# Patient Record
Sex: FEMALE | Race: WHITE | NOT HISPANIC OR LATINO | Employment: FULL TIME | URBAN - METROPOLITAN AREA
[De-identification: names, ages, dates, MRNs, and addresses within clinical notes are randomized per-mention and may not be internally consistent; named-entity substitution may affect disease eponyms.]

---

## 2017-09-15 ENCOUNTER — ALLSCRIPTS OFFICE VISIT (OUTPATIENT)
Dept: OTHER | Facility: OTHER | Age: 57
End: 2017-09-15

## 2017-09-15 ENCOUNTER — GENERIC CONVERSION - ENCOUNTER (OUTPATIENT)
Dept: OTHER | Facility: OTHER | Age: 57
End: 2017-09-15

## 2017-09-15 DIAGNOSIS — M25.512 PAIN IN LEFT SHOULDER: ICD-10-CM

## 2017-09-15 DIAGNOSIS — E11.9 TYPE 2 DIABETES MELLITUS WITHOUT COMPLICATIONS (HCC): ICD-10-CM

## 2017-09-15 DIAGNOSIS — I10 ESSENTIAL (PRIMARY) HYPERTENSION: ICD-10-CM

## 2017-09-15 DIAGNOSIS — R00.2 PALPITATIONS: ICD-10-CM

## 2017-11-30 ENCOUNTER — LAB CONVERSION - ENCOUNTER (OUTPATIENT)
Dept: OTHER | Facility: OTHER | Age: 57
End: 2017-11-30

## 2017-11-30 LAB
A/G RATIO (HISTORICAL): 1.5 (CALC) (ref 1–2.5)
ALBUMIN SERPL BCP-MCNC: 4.3 G/DL (ref 3.6–5.1)
ALP SERPL-CCNC: 50 U/L (ref 33–130)
ALT SERPL W P-5'-P-CCNC: 16 U/L (ref 6–29)
AST SERPL W P-5'-P-CCNC: 12 U/L (ref 10–35)
BASOPHILS # BLD AUTO: 0.9 %
BASOPHILS # BLD AUTO: 61 CELLS/UL (ref 0–200)
BILIRUB SERPL-MCNC: 0.4 MG/DL (ref 0.2–1.2)
BILIRUB UR QL STRIP: NEGATIVE
BUN SERPL-MCNC: 39 MG/DL (ref 7–25)
BUN/CREA RATIO (HISTORICAL): 36 (CALC) (ref 6–22)
CALCIUM SERPL-MCNC: 10.2 MG/DL (ref 8.6–10.4)
CHLORIDE SERPL-SCNC: 105 MMOL/L (ref 98–110)
CHOLEST SERPL-MCNC: 189 MG/DL
CHOLEST/HDLC SERPL: 3.9 (CALC)
CO2 SERPL-SCNC: 27 MMOL/L (ref 20–31)
COLOR UR: YELLOW
COMMENT (HISTORICAL): CLEAR
CREAT SERPL-MCNC: 1.08 MG/DL (ref 0.5–1.05)
CREATININE, RANDOM URINE (HISTORICAL): 51 MG/DL (ref 20–320)
DEPRECATED RDW RBC AUTO: 12.9 % (ref 11–15)
EGFR AFRICAN AMERICAN (HISTORICAL): 65 ML/MIN/1.73M2
EGFR-AMERICAN CALC (HISTORICAL): 56 ML/MIN/1.73M2
EOSINOPHIL # BLD AUTO: 279 CELLS/UL (ref 15–500)
EOSINOPHIL # BLD AUTO: 4.1 %
FECAL OCCULT BLOOD DIAGNOSTIC (HISTORICAL): NEGATIVE
GAMMA GLOBULIN (HISTORICAL): 2.9 G/DL (CALC) (ref 1.9–3.7)
GLUCOSE (HISTORICAL): 154 MG/DL (ref 65–99)
GLUCOSE (HISTORICAL): NEGATIVE
HCT VFR BLD AUTO: 35.4 % (ref 35–45)
HDLC SERPL-MCNC: 49 MG/DL
HGB BLD-MCNC: 11.4 G/DL (ref 11.7–15.5)
KETONES UR STRIP-MCNC: NEGATIVE MG/DL
LDL CHOLESTEROL (HISTORICAL): 121 MG/DL (CALC)
LEUKOCYTE ESTERASE UR QL STRIP: NEGATIVE
LYMPHOCYTES # BLD AUTO: 1945 CELLS/UL (ref 850–3900)
LYMPHOCYTES # BLD AUTO: 28.6 %
MAGNESIUM, UR (HISTORICAL): 1 MG/DL
MCH RBC QN AUTO: 28.8 PG (ref 27–33)
MCHC RBC AUTO-ENTMCNC: 32.2 G/DL (ref 32–36)
MCV RBC AUTO: 89.4 FL (ref 80–100)
MICROALBUMIN/CREATININE RATIO (HISTORICAL): 20 MCG/MG CREAT
MONOCYTES # BLD AUTO: 626 CELLS/UL (ref 200–950)
MONOCYTES (HISTORICAL): 9.2 %
NEUTROPHILS # BLD AUTO: 3890 CELLS/UL (ref 1500–7800)
NEUTROPHILS # BLD AUTO: 57.2 %
NITRITE UR QL STRIP: NEGATIVE
NON-HDL-CHOL (CHOL-HDL) (HISTORICAL): 140 MG/DL (CALC)
PH UR STRIP.AUTO: 6 [PH] (ref 5–8)
PLATELET # BLD AUTO: 198 THOUSAND/UL (ref 140–400)
PMV BLD AUTO: 11.1 FL (ref 7.5–12.5)
POTASSIUM SERPL-SCNC: 4.5 MMOL/L (ref 3.5–5.3)
PROT UR STRIP-MCNC: NEGATIVE MG/DL
RBC # BLD AUTO: 3.96 MILLION/UL (ref 3.8–5.1)
SODIUM SERPL-SCNC: 138 MMOL/L (ref 135–146)
SP GR UR STRIP.AUTO: 1.01 (ref 1–1.03)
TOTAL PROTEIN (HISTORICAL): 7.2 G/DL (ref 6.1–8.1)
TRIGL SERPL-MCNC: 86 MG/DL
TSH SERPL DL<=0.05 MIU/L-ACNC: 2.35 MIU/L (ref 0.4–4.5)
WBC # BLD AUTO: 6.8 THOUSAND/UL (ref 3.8–10.8)

## 2017-12-07 ENCOUNTER — GENERIC CONVERSION - ENCOUNTER (OUTPATIENT)
Dept: OTHER | Facility: OTHER | Age: 57
End: 2017-12-07

## 2017-12-07 ENCOUNTER — ALLSCRIPTS OFFICE VISIT (OUTPATIENT)
Dept: OTHER | Facility: OTHER | Age: 57
End: 2017-12-07

## 2017-12-08 NOTE — PROGRESS NOTES
Assessment    1  DM type 2 (diabetes mellitus, type 2) (250 00) (E11 9)   2  Hypertension, essential (401 9) (I10)   3  Hyperlipidemia, unspecified hyperlipidemia type (272 4) (E78 5)   4  Seasonal allergies (477 9) (J30 2)    Plan  Left shoulder pain, unspecified chronicity    · Meloxicam 15 MG Oral Tablet; TAKE 1 TABLET DAILY WITH FOOD  Seasonal allergies    · Loratadine 10 MG Oral Tablet; TAKE 1 TABLET DAILY AS NEEDED    Discussion/Summary  Possible side effects of new medications were reviewed with the patient/guardian today  The treatment plan was reviewed with the patient/guardian  The patient/guardian understands and agrees with the treatment plan      Chief Complaint  FOLLOW UP    C/O POSSIBLE ALLERGIES, HAS STARTED CHLOR TABS  WOULD LIKE THROAT CHECKED   Patient is here today for follow up of chronic conditions described in HPI  History of Present Illness  She is doing well overall  BS are a bit high  She has some hypoglycemic events (lows to 30's)  She is on metformin and Amaryl  She knows the s/s of hypoglycemia  lipids are not at goal  She declines a statin medication  BP is at goal  She has no HA or vision changes  She has no CP or SOB  She has no cough  Review of Systems   Constitutional: No fever, no chills, feels well, no tiredness, no recent weight gain or weight loss  Eyes: No complaints of eye pain, no red eyes, no eyesight problems, no discharge, no dry eyes, no itching of eyes  ENT: no complaints of earache, no loss of hearing, no nose bleeds, no nasal discharge, no sore throat, no hoarseness  Cardiovascular: No complaints of slow heart rate, no fast heart rate, no chest pain, no palpitations, no leg claudication, no lower extremity edema  Respiratory: No complaints of shortness of breath, no wheezing, no cough, no SOB on exertion, no orthopnea, no PND  Gastrointestinal: No complaints of abdominal pain, no constipation, no nausea or vomiting, no diarrhea, no bloody stools  Genitourinary: No complaints of dysuria, no incontinence, no pelvic pain, no dysmenorrhea, no vaginal discharge or bleeding  Musculoskeletal: No complaints of arthralgias, no myalgias, no joint swelling or stiffness, no limb pain or swelling  Integumentary: No complaints of skin rash or lesions, no itching, no skin wounds, no breast pain or lump  Neurological: No complaints of headache, no confusion, no convulsions, no numbness, no dizziness or fainting, no tingling, no limb weakness, no difficulty walking  Psychiatric: Not suicidal, no sleep disturbance, no anxiety or depression, no change in personality, no emotional problems  Endocrine: No complaints of proptosis, no hot flashes, no muscle weakness, no deepening of the voice, no feelings of weakness  Hematologic/Lymphatic: No complaints of swollen glands, no swollen glands in the neck, does not bleed easily, does not bruise easily  Active Problems  1  DM type 2 (diabetes mellitus, type 2) (250 00) (E11 9)   2  GERD without esophagitis (530 81) (K21 9)   3  Heart palpitations (785 1) (R00 2)   4  Denied: History of substance abuse   5  Hypertension, essential (401 9) (I10)   6  Left shoulder pain, unspecified chronicity (719 41) (M25 512)   7  Denied: History of Mental health disorder   8  Peripheral edema (782 3) (R60 9)   9  Screening for colon cancer (V76 51) (Z12 11)    Past Medical History  1  Denied: History of substance abuse   2  Denied: History of Mental health disorder    The active problems and past medical history were reviewed and updated today  Surgical History  1  History of Oral Surgery Tooth Extraction    The surgical history was reviewed and updated today  Family History  Mother    1  Family history of cardiac disorder (V17 49) (Z82 49)   2  Family history of diabetes mellitus (V18 0) (Z83 3)   3  Family history of malignant neoplasm (V16 9) (Z80 9)  Father    4  Family history of cardiac disorder (V17 49) (Z82 49)   5  Family history of malignant neoplasm (V16 9) (Z80 9)  Sister    6  Family history of diabetes mellitus (V18 0) (Z83 3)  Brother    7  Family history of malignant neoplasm (V16 9) (Z80 9)  Grandmother    8  Family history of diabetes mellitus (V18 0) (Z83 3)    The family history was reviewed and updated today  Social History     · Never a smoker  The social history was reviewed and updated today  The social history was reviewed and is unchanged  Current Meds   1  Glimepiride 4 MG Oral Tablet; TAKE 1 TABLET TWICE DAILY  Requested for: 03Mdj2701; Last Rx:80Jxf4463 Ordered   2  Lisinopril 20 MG Oral Tablet; Take one tablet daily  Requested for: 17Gvl6311; Last Rx:55Iej6226 Ordered   3  MetFORMIN HCl - 1000 MG Oral Tablet; TAKE 1 TABLET EVERY 12 HOURS DAILY; Therapy: 49Dem6999 to (Evaluate:12Oct2018)  Requested for: 04QTB4552; Last Rx:94Wcl0604 Ordered   4  RaNITidine HCl - 300 MG Oral Capsule; TAKE 1 CAPSULE TWICE DAILY  Requested for: 77Jak3010; Last Rx:05Zpw3781 Ordered   5  Spironolactone 25 MG Oral Tablet; Take 1 tablet daily  Requested for: 56Eve6896; Last Rx:78Bic3649 Ordered    The medication list was reviewed and updated today  Allergies  1  Aleve TABS   2  codeine   3  Naproxen TABS   4  Sodium Perborate Granules   5  Sulfa Drugs    Vitals  Vital Signs    Recorded: 65Ykf5677 08:16AM   Heart Rate 84   Respiration 15   Systolic 661   Diastolic 74   Height 5 ft 2 in   Weight 229 lb 8 oz   BMI Calculated 41 98   BSA Calculated 2 03   O2 Saturation 98       Physical Exam   Constitutional  General appearance: No acute distress, well appearing and well nourished  Pulmonary  Respiratory effort: No increased work of breathing or signs of respiratory distress  Auscultation of lungs: Clear to auscultation  Cardiovascular  Auscultation of heart: Normal rate and rhythm, normal S1 and S2, without murmurs     Examination of extremities for edema and/or varicosities: Normal    Abdomen  Abdomen: Non-tender, no masses  Liver and spleen: No hepatomegaly or splenomegaly  Health Management  Screening for colon cancer   COLONOSCOPY; every 10 years; Last 00EBB5112; Next Due: 05HEG3134;  Active    Signatures   Electronically signed by : Corby Fernández MD; Dec  7 2017  9:00AM EST                       (Author)

## 2017-12-22 ENCOUNTER — GENERIC CONVERSION - ENCOUNTER (OUTPATIENT)
Dept: OTHER | Facility: OTHER | Age: 57
End: 2017-12-22

## 2018-01-22 VITALS
OXYGEN SATURATION: 98 % | SYSTOLIC BLOOD PRESSURE: 126 MMHG | BODY MASS INDEX: 43.87 KG/M2 | HEIGHT: 62 IN | RESPIRATION RATE: 15 BRPM | WEIGHT: 238.38 LBS | DIASTOLIC BLOOD PRESSURE: 74 MMHG | HEART RATE: 74 BPM

## 2018-01-23 VITALS
RESPIRATION RATE: 15 BRPM | BODY MASS INDEX: 42.23 KG/M2 | HEART RATE: 84 BPM | DIASTOLIC BLOOD PRESSURE: 74 MMHG | WEIGHT: 229.5 LBS | OXYGEN SATURATION: 98 % | SYSTOLIC BLOOD PRESSURE: 120 MMHG | HEIGHT: 62 IN

## 2018-01-23 NOTE — PROGRESS NOTES
History of Present Illness  Care Coordination Encounter Information:   Type of Encounter: In Office   Contact: Initial Contact    Spoke to Patient   met with pt at pcp office  new pt to practice  DM in need of education  pathophysiology of diabetes, Discuss treatment goals, Benefits of control, Discuss general nutrition concepts, Portion sizes, Discuss food label reading, Basic carbohydrate counting, target blood glucose ranges and A1C,discussed testing times importance of reporting readings to pcp  Reviewed symptoms prevention and tx of hypo/hyperglycemia  Discussed BP goals, foot care and importance of exercise  needs eye exam and podiatry appt  set both as her own goal  agrees to follow up call  will record BS at varied times to review next call  Cm called pt after visit left message to call CM if needs assistanc ein finding podiatry or eye docotr for appts  Active Problems    1  DM type 2 (diabetes mellitus, type 2) (250 00) (E11 9)   2  GERD without esophagitis (530 81) (K21 9)   3  Heart palpitations (785 1) (R00 2)   4  Denied: History of substance abuse   5  Hyperlipidemia, unspecified hyperlipidemia type (272 4) (E78 5)   6  Hypertension, essential (401 9) (I10)   7  Left shoulder pain, unspecified chronicity (719 41) (M25 512)   8  Denied: History of Mental health disorder   9  Peripheral edema (782 3) (R60 9)   10  Screening for colon cancer (V76 51) (Z12 11)   11  Seasonal allergies (477 9) (J30 2)    Past Medical History    1  Denied: History of substance abuse   2  Denied: History of Mental health disorder    Surgical History    1  History of Oral Surgery Tooth Extraction    Family History  Mother    1  Family history of cardiac disorder (V17 49) (Z82 49)   2  Family history of diabetes mellitus (V18 0) (Z83 3)   3  Family history of malignant neoplasm (V16 9) (Z80 9)  Father    4  Family history of cardiac disorder (V17 49) (Z82 49)   5   Family history of malignant neoplasm (V16 9) (Z80 9)  Sister    6  Family history of diabetes mellitus (V18 0) (Z83 3)  Brother    7  Family history of malignant neoplasm (V16 9) (Z80 9)  Grandmother    8  Family history of diabetes mellitus (V18 0) (Z83 3)    Social History    · Never a smoker    Current Meds    1  Glimepiride 4 MG Oral Tablet; TAKE 1 TABLET TWICE DAILY  Requested for: 83Yxc5300;   Last Rx:22Sbb1658 Ordered   2  MetFORMIN HCl - 1000 MG Oral Tablet; TAKE 1 TABLET EVERY 12 HOURS DAILY; Therapy: 62Evq6828 to (Evaluate:12Oct2018)  Requested for: 37TAZ2941; Last   Rx:87Vci8570 Ordered    3  RaNITidine HCl - 300 MG Oral Capsule; TAKE 1 CAPSULE TWICE DAILY  Requested for:   21Cde4686; Last Rx:88Mte9564 Ordered    4  Lisinopril 20 MG Oral Tablet; Take one tablet daily  Requested for: 32Asl6088; Last   Rx:56Nva2832 Ordered    5  Meloxicam 15 MG Oral Tablet; TAKE 1 TABLET DAILY WITH FOOD; Therapy: 46KAE0769 to (Evaluate:05Jun2018); Last Rx:96Cms4538 Ordered    6  Spironolactone 25 MG Oral Tablet; Take 1 tablet daily  Requested for: 51Jsb9107; Last   Rx:13Ntt8843 Ordered    7  Loratadine 10 MG Oral Tablet; TAKE 1 TABLET DAILY AS NEEDED; Therapy: 14UTH6460 to (Evaluate:05Jun2018); Last Rx:96Oul3651 Ordered    Allergies    1  Aleve TABS   2  codeine   3  Naproxen TABS   4  Sodium Perborate Granules   5  Sulfa Drugs    Health Management   COLONOSCOPY; every 10 years; Last 88UQR1153; Next Due: 24VAL0639; Active    End of Encounter Meds    1  Glimepiride 4 MG Oral Tablet; TAKE 1 TABLET TWICE DAILY  Requested for: 70Ujx0816;   Last Rx:27Ams9418 Ordered   2  MetFORMIN HCl - 1000 MG Oral Tablet; TAKE 1 TABLET EVERY 12 HOURS DAILY; Therapy: 53Jya6606 to (Evaluate:12Oct2018)  Requested for: 50FPE5033; Last   Rx:00Ujg6393 Ordered    3  RaNITidine HCl - 300 MG Oral Capsule; TAKE 1 CAPSULE TWICE DAILY  Requested for:   71Lhp6493; Last Rx:11Lpu3357 Ordered    4  Lisinopril 20 MG Oral Tablet;  Take one tablet daily  Requested for: 76Azt0301; Last   Rx:72Qke1270 Ordered    5  Meloxicam 15 MG Oral Tablet; TAKE 1 TABLET DAILY WITH FOOD; Therapy: 01MPG7298 to (Evaluate:05Jun2018); Last Rx:19Bze4451 Ordered    6  Spironolactone 25 MG Oral Tablet; Take 1 tablet daily  Requested for: 86Aug9476; Last   Rx:68Keb8080 Ordered    7  Loratadine 10 MG Oral Tablet; TAKE 1 TABLET DAILY AS NEEDED; Therapy: 81MUR8558 to (Evaluate:05Jun2018);  Last Rx:50Lst0008 Ordered    Patient Care Team    Care Team Member Role Specialty Office Number   [phone]   Electronically signed by : Bi Hays, ; Dec  7 2017  5:00PM EST                       (Author)

## 2018-01-23 NOTE — PROGRESS NOTES
History of Present Illness  Care Coordination Encounter Information:   Type of Encounter: Telephonic   Contact: Follow-Up    Spoke to Patient   The reason for this call is to discuss outreach for follow-up /needed services  Patient called to address any questions or concerns  NALM  Active Problems    1  DM type 2 (diabetes mellitus, type 2) (250 00) (E11 9)   2  GERD without esophagitis (530 81) (K21 9)   3  Heart palpitations (785 1) (R00 2)   4  Denied: History of substance abuse   5  Hyperlipidemia, unspecified hyperlipidemia type (272 4) (E78 5)   6  Hypertension, essential (401 9) (I10)   7  Left shoulder pain, unspecified chronicity (719 41) (M25 512)   8  Denied: History of Mental health disorder   9  Peripheral edema (782 3) (R60 9)   10  Screening for colon cancer (V76 51) (Z12 11)   11  Seasonal allergies (477 9) (J30 2)    Past Medical History    1  Denied: History of substance abuse   2  Denied: History of Mental health disorder    Surgical History    1  History of Oral Surgery Tooth Extraction    Family History  Mother    1  Family history of cardiac disorder (V17 49) (Z82 49)   2  Family history of diabetes mellitus (V18 0) (Z83 3)   3  Family history of malignant neoplasm (V16 9) (Z80 9)  Father    4  Family history of cardiac disorder (V17 49) (Z82 49)   5  Family history of malignant neoplasm (V16 9) (Z80 9)  Sister    6  Family history of diabetes mellitus (V18 0) (Z83 3)  Brother    7  Family history of malignant neoplasm (V16 9) (Z80 9)  Grandmother    8  Family history of diabetes mellitus (V18 0) (Z83 3)    Social History    · Never a smoker    Current Meds    1  Glimepiride 4 MG Oral Tablet; TAKE 1 TABLET TWICE DAILY  Requested for: 36Oha3624;   Last Rx:28Joj6112 Ordered   2  MetFORMIN HCl - 1000 MG Oral Tablet; TAKE 1 TABLET EVERY 12 HOURS DAILY; Therapy: 22Czs4044 to (Evaluate:12Oct2018)  Requested for: 53GXB0236; Last   Rx:17Oct2017 Ordered    3   RaNITidine HCl - 300 MG Oral Capsule; TAKE 1 CAPSULE TWICE DAILY  Requested for:   49Agj4798; Last Rx:75Osa5814 Ordered    4  Lisinopril 20 MG Oral Tablet; Take one tablet daily  Requested for: 36Ixh7760; Last   Rx:97Qja0824 Ordered    5  Meloxicam 15 MG Oral Tablet; TAKE 1 TABLET DAILY WITH FOOD; Therapy: 34YJI7968 to (Evaluate:05Jun2018); Last Rx:18Bck6589 Ordered    6  Spironolactone 25 MG Oral Tablet; Take 1 tablet daily  Requested for: 33Jrf0541; Last   Rx:87Ezt2027 Ordered    7  Loratadine 10 MG Oral Tablet; TAKE 1 TABLET DAILY AS NEEDED; Therapy: 89KSH1279 to (Evaluate:05Jun2018); Last Rx:45Gtc4662 Ordered    Allergies    1  Aleve TABS   2  codeine   3  Naproxen TABS   4  Sodium Perborate Granules   5  Sulfa Drugs    Health Management   COLONOSCOPY; every 10 years; Last 49QCU0555; Next Due: 43TOP4882; Active    End of Encounter Meds    1  Glimepiride 4 MG Oral Tablet; TAKE 1 TABLET TWICE DAILY  Requested for: 29Qjj6192;   Last Rx:80Kju1887 Ordered   2  MetFORMIN HCl - 1000 MG Oral Tablet; TAKE 1 TABLET EVERY 12 HOURS DAILY; Therapy: 74Nfs7281 to (Evaluate:12Oct2018)  Requested for: 73XII3158; Last   Rx:57Bmj0344 Ordered    3  RaNITidine HCl - 300 MG Oral Capsule; TAKE 1 CAPSULE TWICE DAILY  Requested for:   67Ish4109; Last Rx:05Lxo0961 Ordered    4  Lisinopril 20 MG Oral Tablet; Take one tablet daily  Requested for: 62Gtr3732; Last   Rx:52Cuj1742 Ordered    5  Meloxicam 15 MG Oral Tablet; TAKE 1 TABLET DAILY WITH FOOD; Therapy: 36NOW0367 to (Evaluate:05Jun2018); Last Rx:39Ncp7837 Ordered    6  Spironolactone 25 MG Oral Tablet; Take 1 tablet daily  Requested for: 46Ccj1815; Last   Rx:85Uey4605 Ordered    7  Loratadine 10 MG Oral Tablet; TAKE 1 TABLET DAILY AS NEEDED; Therapy: 82JOR4424 to (Evaluate:05Jun2018);  Last Rx:34Kth2037 Ordered    Patient Care Team    Care Team Member Role Specialty Office Number   [phone] [facility]        Signatures   Electronically signed by : Shirlene Harley, ; Dec 22 2017  2:33PM EST (Author)

## 2018-02-20 DIAGNOSIS — J30.2 SEASONAL ALLERGIC RHINITIS, UNSPECIFIED CHRONICITY, UNSPECIFIED TRIGGER: ICD-10-CM

## 2018-02-20 DIAGNOSIS — R52 PAIN: ICD-10-CM

## 2018-02-20 DIAGNOSIS — E11.9 TYPE 2 DIABETES MELLITUS WITHOUT COMPLICATION, UNSPECIFIED LONG TERM INSULIN USE STATUS: Primary | ICD-10-CM

## 2018-02-20 RX ORDER — LORATADINE 10 MG/1
1 TABLET ORAL DAILY PRN
COMMUNITY
Start: 2017-12-07 | End: 2018-02-20 | Stop reason: SDUPTHER

## 2018-02-20 RX ORDER — MELOXICAM 15 MG/1
1 TABLET ORAL DAILY
COMMUNITY
Start: 2017-12-07 | End: 2018-02-20 | Stop reason: SDUPTHER

## 2018-02-20 RX ORDER — LISINOPRIL 20 MG/1
1 TABLET ORAL DAILY
COMMUNITY
End: 2018-02-20 | Stop reason: SDUPTHER

## 2018-02-20 RX ORDER — GLIMEPIRIDE 4 MG/1
1 TABLET ORAL 2 TIMES DAILY
COMMUNITY
End: 2018-02-20 | Stop reason: SDUPTHER

## 2018-02-20 RX ORDER — SPIRONOLACTONE 25 MG/1
1 TABLET ORAL DAILY
COMMUNITY
End: 2018-02-20 | Stop reason: SDUPTHER

## 2018-02-22 RX ORDER — LORATADINE 10 MG/1
10 TABLET ORAL DAILY
Qty: 90 TABLET | Refills: 3 | Status: SHIPPED | OUTPATIENT
Start: 2018-02-22 | End: 2020-01-27

## 2018-02-22 RX ORDER — LISINOPRIL 20 MG/1
20 TABLET ORAL DAILY
Qty: 30 TABLET | Refills: 5 | Status: SHIPPED | OUTPATIENT
Start: 2018-02-22 | End: 2018-09-09 | Stop reason: SDUPTHER

## 2018-02-22 RX ORDER — GLIMEPIRIDE 4 MG/1
4 TABLET ORAL 2 TIMES DAILY
Qty: 60 TABLET | Refills: 3 | Status: SHIPPED | OUTPATIENT
Start: 2018-02-22 | End: 2018-07-03 | Stop reason: SDUPTHER

## 2018-02-22 RX ORDER — MELOXICAM 15 MG/1
15 TABLET ORAL DAILY
Qty: 90 TABLET | Refills: 3 | Status: SHIPPED | OUTPATIENT
Start: 2018-02-22 | End: 2019-03-25 | Stop reason: SDUPTHER

## 2018-02-22 RX ORDER — SPIRONOLACTONE 25 MG/1
25 TABLET ORAL DAILY
Qty: 30 TABLET | Refills: 5 | Status: SHIPPED | OUTPATIENT
Start: 2018-02-22 | End: 2018-09-09 | Stop reason: SDUPTHER

## 2018-03-20 ENCOUNTER — OFFICE VISIT (OUTPATIENT)
Dept: FAMILY MEDICINE CLINIC | Facility: CLINIC | Age: 58
End: 2018-03-20
Payer: COMMERCIAL

## 2018-03-20 VITALS
TEMPERATURE: 98.2 F | DIASTOLIC BLOOD PRESSURE: 60 MMHG | BODY MASS INDEX: 43.98 KG/M2 | RESPIRATION RATE: 18 BRPM | SYSTOLIC BLOOD PRESSURE: 104 MMHG | OXYGEN SATURATION: 98 % | WEIGHT: 224 LBS | HEART RATE: 85 BPM | HEIGHT: 60 IN

## 2018-03-20 DIAGNOSIS — R09.89 CHEST CONGESTION: ICD-10-CM

## 2018-03-20 DIAGNOSIS — H65.03 BILATERAL ACUTE SEROUS OTITIS MEDIA, RECURRENCE NOT SPECIFIED: ICD-10-CM

## 2018-03-20 DIAGNOSIS — R06.2 WHEEZING: ICD-10-CM

## 2018-03-20 DIAGNOSIS — R05.9 COUGH: Primary | ICD-10-CM

## 2018-03-20 DIAGNOSIS — R06.02 SHORTNESS OF BREATH: Primary | ICD-10-CM

## 2018-03-20 DIAGNOSIS — J01.90 ACUTE NON-RECURRENT SINUSITIS, UNSPECIFIED LOCATION: ICD-10-CM

## 2018-03-20 PROCEDURE — 99213 OFFICE O/P EST LOW 20 MIN: CPT | Performed by: NURSE PRACTITIONER

## 2018-03-20 RX ORDER — CIPROFLOXACIN 500 MG/1
500 TABLET, FILM COATED ORAL EVERY 12 HOURS SCHEDULED
Qty: 20 TABLET | Refills: 1 | Status: SHIPPED | OUTPATIENT
Start: 2018-03-20 | End: 2018-03-20 | Stop reason: SDUPTHER

## 2018-03-20 RX ORDER — RANITIDINE 300 MG/1
1 CAPSULE ORAL 2 TIMES DAILY
COMMUNITY
End: 2022-03-28

## 2018-03-20 RX ORDER — ALBUTEROL SULFATE 90 UG/1
2 AEROSOL, METERED RESPIRATORY (INHALATION) EVERY 6 HOURS PRN
Qty: 1 INHALER | Refills: 1 | Status: SHIPPED | OUTPATIENT
Start: 2018-03-20 | End: 2020-01-27

## 2018-03-20 RX ORDER — CIPROFLOXACIN 500 MG/1
500 TABLET, FILM COATED ORAL EVERY 12 HOURS SCHEDULED
Qty: 20 TABLET | Refills: 1 | Status: SHIPPED | OUTPATIENT
Start: 2018-03-20 | End: 2018-03-30

## 2018-03-20 NOTE — PROGRESS NOTES
Discontinued Advair due to insurance coverage; ordered Ventolin 90 mcg INH 2 puffs every 6 hours for 2 weeks, then PRN for SOB

## 2018-03-20 NOTE — LETTER
March 20, 2018     Patient: Emily Trent   YOB: 1960   Date of Visit: 3/20/2018       To Whom it May Concern:    Angelina Pierre is under my professional care  She was seen in my office on 3/20/2018  Please excuse patient from work on 3/20/18 due to medical illness  She may return to work on 3/26/18  If you have any questions or concerns, please don't hesitate to call           Sincerely,          Ross Holstein, CRNP        CC: No Recipients

## 2018-03-20 NOTE — PROGRESS NOTES
Assessment/Plan:      Diagnoses and all orders for this visit:    Cough  -     XR chest pa & lateral; Future  -     fluticasone-salmeterol (ADVAIR HFA) 230-21 MCG/ACT inhaler; Inhale 2 puffs 2 (two) times a day For 1 month  -     ciprofloxacin (CIPRO) 500 mg tablet; Take 1 tablet (500 mg total) by mouth every 12 (twelve) hours for 10 days    Chest congestion  -     XR chest pa & lateral; Future  -     fluticasone-salmeterol (ADVAIR HFA) 230-21 MCG/ACT inhaler; Inhale 2 puffs 2 (two) times a day For 1 month  -     ciprofloxacin (CIPRO) 500 mg tablet; Take 1 tablet (500 mg total) by mouth every 12 (twelve) hours for 10 days    Acute non-recurrent sinusitis, unspecified location  -     ciprofloxacin (CIPRO) 500 mg tablet; Take 1 tablet (500 mg total) by mouth every 12 (twelve) hours for 10 days    Bilateral acute serous otitis media, recurrence not specified  -     ciprofloxacin (CIPRO) 500 mg tablet; Take 1 tablet (500 mg total) by mouth every 12 (twelve) hours for 10 days    Other orders  -     ranitidine (ZANTAC) 300 MG capsule; Take 1 capsule by mouth 2 (two) times a day OTC          Subjective:     Patient ID: Twila García is a 59 y o  female  Patient presents to office with C/O productive cough for yellow mucous, chest congestion, and chest tightness  C/O B/L ear pain with fullness, sinus congestion for clear mucous, and sore throat  Denies fever, chills, or body aches  Patient is unable to sleep at night due to wheezing  Patient is taking Dayquil with no relief of symptoms  Symptoms started last Wedsnday  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  Review of Systems    GENERAL:  Feels well, denies any significant changes in weight without trying  SKIN:  Denies rashes, lesions, opened areas, wounds, change in moles or any other skin changes  HEENT:  Denies any head injury or headaches  Negative blurred vision, floaters, spots before eyes, infections, or other vision problems  Negative significant changes in vision or hearing  Negative tinnitus, vertigo, or infections  Negative hay fever, C/O B/L ear pain with fullness, C/O sinus congestion with yellow nasal discharge, No bloody noses or problems with smell  C/O sore throat, no bleeding gums, ulcers, or sores  Glasses/Contacts: Glasses  Hearing Aids: NO   NECK:  Denies lumps, goiter, pain, swollen glands, or lymphadenopathy  BREASTS:  Denies lumps, pain, nipple discharge, swelling, redness, or any other changes  RESPIRATORY:  C/O productive cough for yellow mucous with wheezing, No shortness of breath, dyspnea, or orthopnea  CARDIOVASCULAR:  Denies chest pain or palpitations  GASTROINTESTINAL:  Appetite good, denies nausea, vomiting, or indigestion  Bowel movements normal occurring about once daily or every other day  URINARY:  Denies frequency, urgency, incontinence, dysuria, hematuria, nocturia, or recent flank pain  GENITAL:  Denies vaginal discharge, pelvic infection, lesions, ulcers, or pain  Negative dyspareunia or abnormal vaginal bleeding  PERIPHERAL VASCULAR:  Denies varicosities, swelling, skin changes, or pain  MUSCULOSKELETAL:  Denies back, joint, or muscle pain  Negative problems with mobility or movement  PSYCHIATRIC:  Denies problems with depression, anxiety, anger, or other psychiatric symptoms  NEUROLOGIC:  Denies fainting, dizziness, memory problems, seizures, tingling, motor or sensory loss  HEMATOLOGIC:  Denies easy bruising, bleeding, or anemia  ENDOCRINE:  Denies thyroid problems, temperature intolerance, excessive sweating, or other endocrine symptoms  Objective:     Physical Exam    GENERAL:  Appears well nourished, well groomed, in no acute distress  SKIN:  Palms warm, dry, color good  Nails without clubbing or cyanosis  No lesions, ulcerations, or wounds  HEAD:  Hair is average texture  Scalp without lesions, normocephalic, and atraumatic    EYES:  Visual fields full by confrontation  Conjunctiva pink, sclera white, PERRLA  Extraocular movements intact  Disc margins sharp, without hemorrhages or exudate  No arteriolar narrowing or A-V nicking  EARS:  B/L ear canals clear  B/L TMs red with serous effusion and decreased light reflex  Acuity good to whispered voice  Rosales midline  AC>BC  NOSE: Mucosa pink, moist, septum midline  Negative sinus tenderness  B/L turbinates red and edematous with yellow exudate  MOUTH:  Oral mucosa pink  Pharynx red with yellow PND, moist, with swelling  Dentition ok  Tongue midline  NECK:  Supple, trachea midline, Negative thyromegaly, lymphadenopathy, or swollen glands  LYMPH NODES:  Negative enlargement of neck, axillary, epitrochlear, or inguinal nodes  THORAX/LUNGS  Thorax symmetric with good excursion  Lungs resonant  Breath sounds diminished bronchioles bilaterally with wheezing and rhonchi noted  Diaphragm descends within normal limits  CARDIOVASCULAR:  Carotid upstrokes brisk and without bruits  Apical impulse discrete and tapping, barely palpable in the 5th ICS/MCL  Normal S1 and Normal S2, Negative S3 or S4  Negative murmurs, thrills, lifts, or heaves  ABDOMEN:  Protuberant, bowel sounds normal active x 4 quadrants  Negative tenderness  Negative masses  Negative hepatomegaly  Negative splenomegaly  Negative costovertebral tenderness  EXTREMITIES:  Warm, calves supple, non-tender, negative for edema  Negative stasis pigmentation or ulcers  +2 pulses throughout  MUSCULOSKELETAL:  Negative joint deformities  Good range of motion in hands, wrists, elbows, shoulders, spine, hips, knees, and ankles  Negative spinal curvature  NEUROLOGICAL:  Mental status:  Awake, alert, and oriented to person, place, time, and event  Normal thought processes  Cranial Nerves:  II-XII intact  Motor:  Good muscle bulk and tone  Strength: 5/5 throughout    Cerebellar:  Rapid alternating movements, point-to-point movements intact  Gait stable and fluid  Sensory:  Pinprick, light touch, position sense, vibration, and stereogenesis intact  Romberg: Negative  Reflexes: +2 throughout

## 2018-03-20 NOTE — PATIENT INSTRUCTIONS
Pharyngitis   AMBULATORY CARE:   Pharyngitis , or sore throat, is inflammation of the tissues and structures in your pharynx (throat)  Pharyngitis is most often caused by bacteria  It may also be caused by a cold or flu virus  Other causes include smoking, allergies, or acid reflux  Signs and symptoms that may occur with pharyngitis:   · Sore throat or pain when you swallow    · Fever, chills, and body aches    · Hoarse or raspy voice    · Cough, runny or stuffy nose, itchy or watery eyes    · Headache    · Upset stomach and loss of appetite    · Mild neck stiffness    · Swollen glands that feel like hard lumps when you touch your neck    · White and yellow pus-filled blisters in the back of your throat  Call 911 for any of the following:   · You have trouble breathing or swallowing because your throat is swollen or sore  Seek care immediately if:   · You are drooling because it hurts too much to swallow  · Your fever is higher than 102? F (39?C) or lasts longer than 3 days  · You are confused  · You taste blood in your throat  Contact your healthcare provider if:   · Your throat pain gets worse  · You have a painful lump in your throat that does not go away after 5 days  · Your symptoms do not improve after 5 days  · You have questions or concerns about your condition or care  Treatment for pharyngitis:  Viral pharyngitis will go away on its own without treatment  Your sore throat should start to feel better in 3 to 5 days for both viral and bacterial infections  You may need any of the following:  · Antibiotics  treat a bacterial infection  · NSAIDs , such as ibuprofen, help decrease swelling, pain, and fever  NSAIDs can cause stomach bleeding or kidney problems in certain people  If you take blood thinner medicine, always ask your healthcare provider if NSAIDs are safe for you  Always read the medicine label and follow directions  · Acetaminophen  decreases pain and fever   It is available without a doctor's order  Ask how much to take and how often to take it  Follow directions  Acetaminophen can cause liver damage if not taken correctly  Manage your symptoms:   · Gargle salt water  Mix ¼ teaspoon salt in an 8 ounce glass of warm water and gargle  This may help decrease swelling in your throat  · Drink liquids as directed  You may need to drink more liquids than usual  Liquids may help soothe your throat and prevent dehydration  Ask how much liquid to drink each day and which liquids are best for you  · Use a cool-steam humidifier  to help moisten the air in your room and calm your cough  · Soothe your throat  with cough drops, ice, soft foods, or popsicles  Prevent the spread of pharyngitis:  Cover your mouth and nose when you cough or sneeze  Do not share food or drinks  Wash your hands often  Use soap and water  If soap and water are unavailable, use an alcohol based hand   Follow up with your healthcare provider as directed:  Write down your questions so you remember to ask them during your visits  © 2017 [address] [facility] Information is for End User's use only and may not be sold, redistributed or otherwise used for commercial purposes  All illustrations and images included in CareNotes® are the copyrighted property of [employer] M [employer]  or Navid Shah  The above information is an  only  It is not intended as medical advice for individual conditions or treatments  Talk to your doctor, nurse or pharmacist before following any medical regimen to see if it is safe and effective for you  Acute Bronchitis   AMBULATORY CARE:   Acute bronchitis  is swelling and irritation in the air passages of your lungs  This irritation may cause you to cough or have other breathing problems  Acute bronchitis often starts because of another illness, such as a cold or the flu   The illness spreads from your nose and throat to your windpipe and airways  Bronchitis is often called a chest cold  Acute bronchitis lasts about 3 to 6 weeks and is usually not a serious illness  Your cough can last for several weeks  You may have any of the following symptoms:   · A cough with sputum that may be clear, yellow, or green    · Feeling more tired than usual, and body aches    · A fever and chills    · Wheezing when you breathe    · A tight chest or pain when you breathe or cough  Seek care immediately if:   · You cough up blood  · Your lips or fingernails turn blue  · You feel like you are not getting enough air when you breathe  Contact your healthcare provider if:   · You have a fever  · Your breathing problems do not go away or get worse  · Your cough does not get better within 4 weeks  · You have questions or concerns about your condition or care  Self-care:   · Get more rest   Rest helps your body to heal  Slowly start to do more each day  Rest when you feel it is needed  · Avoid irritants in the air  Avoid chemicals, fumes, and dust  Wear a face mask if you must work around dust or fumes  Stay inside on days when air pollution levels are high  If you have allergies, stay inside when pollen counts are high  Do not use aerosol products, such as spray-on deodorant, bug spray, and hair spray  · Do not smoke or be around others who smoke  Nicotine and other chemicals in cigarettes and cigars damages the cilia that move mucus out of your lungs  Ask your healthcare provider for information if you currently smoke and need help to quit  E-cigarettes or smokeless tobacco still contain nicotine  Talk to your healthcare provider before you use these products  · Drink liquids as directed  Liquids help keep your air passages moist and help you cough up mucus  You may need to drink more liquids when you have acute bronchitis  Ask how much liquid to drink each day and which liquids are best for you  · Use a humidifier or vaporizer    Use a cool mist humidifier or a vaporizer to increase air moisture in your home  This may make it easier for you to breathe and help decrease your cough  Prevent acute bronchitis by doing the following:   · Get the vaccinations you need  Ask your healthcare provider if you should get vaccinated against the flu or pneumonia  · Prevent the spread of germs  You can decrease your risk of acute bronchitis and other illnesses by doing the following:     [facility] your hands often with soap and water  Carry germ-killing hand lotion or gel with you  You can use the lotion or gel to clean your hands when soap and water are not available  ¨ Do not touch your eyes, nose, or mouth unless you have washed your hands first     ¨ Always cover your mouth when you cough to prevent the spread of germs  It is best to cough into a tissue or your shirt sleeve instead of into your hand  Ask those around you cover their mouths when they cough  ¨ Try to avoid people who have a cold or the flu  If you are sick, stay away from others as much as possible  Medicines: Your healthcare provider may  give you any of the following:  · Ibuprofen or acetaminophen  are medicines that help lower your fever  They are available without a doctor's order  Ask your healthcare provider which medicine is right for you  Ask how much to take and how often to take it  Follow directions  These medicines can cause stomach bleeding if not taken correctly  Ibuprofen can cause kidney damage  Do not take ibuprofen if you have kidney disease, an ulcer, or allergies to aspirin  Acetaminophen can cause liver damage  Do not take more than 4,000 milligrams in 24 hours  · Decongestants  help loosen mucus in your lungs and make it easier to cough up  This can help you breathe easier  · Cough suppressants  decrease your urge to cough  If your cough produces mucus, do not take a cough suppressant unless your healthcare provider tells you to   Your healthcare provider may suggest that you take a cough suppressant at night so you can rest     · Inhalers  may be given  Your healthcare provider may give you one or more inhalers to help you breathe easier and cough less  An inhaler gives your medicine to open your airways  Ask your healthcare provider to show you how to use your inhaler correctly  Follow up with your healthcare provider as directed:  Write down questions you have so you will remember to ask them during your follow-up visits  © 2017 [address] [employer] Information is for End User's use only and may not be sold, redistributed or otherwise used for commercial purposes  All illustrations and images included in CareNotes® are the copyrighted property of [employer] M [employer]  or Navid Shah  The above information is an  only  It is not intended as medical advice for individual conditions or treatments  Talk to your doctor, nurse or pharmacist before following any medical regimen to see if it is safe and effective for you      Instructed patient to use OTC Mucinex DM as directed for 2 weeks

## 2018-03-21 LAB
LEFT EYE DIABETIC RETINOPATHY: NORMAL
RIGHT EYE DIABETIC RETINOPATHY: NORMAL

## 2018-03-21 PROCEDURE — 3072F LOW RISK FOR RETINOPATHY: CPT | Performed by: NURSE PRACTITIONER

## 2018-06-30 DIAGNOSIS — E11.9 TYPE 2 DIABETES MELLITUS WITHOUT COMPLICATION (HCC): ICD-10-CM

## 2018-06-30 RX ORDER — GLIMEPIRIDE 4 MG/1
TABLET ORAL
Qty: 60 TABLET | Refills: 3 | Status: CANCELLED | OUTPATIENT
Start: 2018-06-30

## 2018-07-03 DIAGNOSIS — E11.65 TYPE 2 DIABETES MELLITUS WITH HYPERGLYCEMIA, WITHOUT LONG-TERM CURRENT USE OF INSULIN (HCC): Primary | ICD-10-CM

## 2018-07-03 RX ORDER — GLIMEPIRIDE 4 MG/1
4 TABLET ORAL 2 TIMES DAILY
Qty: 180 TABLET | Refills: 1 | Status: SHIPPED | OUTPATIENT
Start: 2018-07-03 | End: 2019-01-02 | Stop reason: SDUPTHER

## 2018-09-09 DIAGNOSIS — E11.9 TYPE 2 DIABETES MELLITUS WITHOUT COMPLICATION (HCC): ICD-10-CM

## 2018-09-10 PROCEDURE — 4010F ACE/ARB THERAPY RXD/TAKEN: CPT | Performed by: NURSE PRACTITIONER

## 2018-09-10 RX ORDER — LISINOPRIL 20 MG/1
TABLET ORAL
Qty: 30 TABLET | Refills: 5 | Status: SHIPPED | OUTPATIENT
Start: 2018-09-10 | End: 2019-01-02 | Stop reason: SDUPTHER

## 2018-09-10 RX ORDER — SPIRONOLACTONE 25 MG/1
TABLET ORAL
Qty: 30 TABLET | Refills: 5 | Status: SHIPPED | OUTPATIENT
Start: 2018-09-10 | End: 2019-01-02 | Stop reason: SDUPTHER

## 2018-09-20 ENCOUNTER — OFFICE VISIT (OUTPATIENT)
Dept: FAMILY MEDICINE CLINIC | Facility: CLINIC | Age: 58
End: 2018-09-20
Payer: COMMERCIAL

## 2018-09-20 VITALS
OXYGEN SATURATION: 98 % | SYSTOLIC BLOOD PRESSURE: 116 MMHG | HEART RATE: 81 BPM | RESPIRATION RATE: 16 BRPM | WEIGHT: 220.8 LBS | BODY MASS INDEX: 43.35 KG/M2 | DIASTOLIC BLOOD PRESSURE: 68 MMHG | HEIGHT: 60 IN

## 2018-09-20 DIAGNOSIS — E11.9 ENCOUNTER FOR DIABETIC FOOT EXAM (HCC): ICD-10-CM

## 2018-09-20 DIAGNOSIS — E11.9 TYPE 2 DIABETES MELLITUS WITHOUT COMPLICATION, WITHOUT LONG-TERM CURRENT USE OF INSULIN (HCC): ICD-10-CM

## 2018-09-20 DIAGNOSIS — Z23 NEED FOR INFLUENZA VACCINATION: Primary | ICD-10-CM

## 2018-09-20 DIAGNOSIS — E78.5 HYPERLIPIDEMIA, UNSPECIFIED HYPERLIPIDEMIA TYPE: ICD-10-CM

## 2018-09-20 DIAGNOSIS — E66.01 MORBID OBESITY WITH BMI OF 40.0-44.9, ADULT (HCC): ICD-10-CM

## 2018-09-20 DIAGNOSIS — I10 HYPERTENSION, ESSENTIAL: ICD-10-CM

## 2018-09-20 DIAGNOSIS — R21 RASH: ICD-10-CM

## 2018-09-20 PROCEDURE — 3008F BODY MASS INDEX DOCD: CPT | Performed by: FAMILY MEDICINE

## 2018-09-20 PROCEDURE — 3074F SYST BP LT 130 MM HG: CPT | Performed by: FAMILY MEDICINE

## 2018-09-20 PROCEDURE — 3078F DIAST BP <80 MM HG: CPT | Performed by: FAMILY MEDICINE

## 2018-09-20 PROCEDURE — 99214 OFFICE O/P EST MOD 30 MIN: CPT | Performed by: FAMILY MEDICINE

## 2018-09-20 RX ORDER — TRIAMCINOLONE ACETONIDE 1 MG/G
CREAM TOPICAL 2 TIMES DAILY
Qty: 30 G | Refills: 0 | Status: SHIPPED | OUTPATIENT
Start: 2018-09-20 | End: 2021-03-25

## 2018-09-20 NOTE — PROGRESS NOTES
Assessment/Plan:    No problem-specific Assessment & Plan notes found for this encounter  Diagnoses and all orders for this visit:    Need for influenza vaccination  -     influenza vaccine, 6971-8182, quadrivalent, recombinant, PF, 0 5 mL, for patients 18 yr+ ([facility])    Rash  -     triamcinolone (KENALOG) 0 1 % cream; Apply topically 2 (two) times a day    Type 2 diabetes mellitus without complication, without long-term current use of insulin ([facility])  -     Hemoglobin A1c (w/out EAG); Future  -     Lipid panel; Future  -     Comprehensive metabolic panel; Future  -     TSH, 3rd generation; Future    Hypertension, essential  -     Lipid panel; Future  -     Comprehensive metabolic panel; Future  -     TSH, 3rd generation; Future    Hyperlipidemia, unspecified hyperlipidemia type  -     Lipid panel; Future  -     Comprehensive metabolic panel; Future  -     TSH, 3rd generation; Future    Morbid obesity with BMI of 40 0-44 9, adult ([facility] )  -     Lipid panel; Future  -     Comprehensive metabolic panel; Future  -     TSH, 3rd generation; Future    Encounter for diabetic foot exam ([facility] )          Subjective:      Patient ID: Samir Heard is a 59 y o  female  She has a trigger mechanism on the left thumb  She declines intervention for it at this time  She has a rash on her right hand  She was exposed to scabies at work  She has no itching  Her BP is at goal on her current regimen  She has no cough  She has no HA or vision changes  She has no CP or SOB  She is not on a statin medication  Her lipids are fairly well controlled on no medications  Her DM is well controlled on metformin and Amaryl  She has some minor hypoglycemia  She knows the signs and symptoms of hypoglycemia and its treatment          The following portions of the patient's history were reviewed and updated as appropriate:   She  has a past medical history of Arthritis; Bursitis of both feet; Carpal tunnel syndrome, bilateral; Diabetes mellitus ([place] [address] ); and Hypertension  She   Patient Active Problem List    Diagnosis Date Noted    Morbid obesity with BMI of 40 0-44 9, adult ([place] [address] ) 09/20/2018    Rash 09/20/2018    Encounter for diabetic foot exam ([place] [address] ) 09/20/2018    Hyperlipidemia 12/07/2017    DM type 2 (diabetes mellitus, type 2) ([place] [address] ) 09/15/2017    Hypertension, essential 09/15/2017     She  has a past surgical history that includes [place] tooth extraction  Her family history includes Cancer in her brother, father, and mother; Diabetes in her family, mother, and sister; Heart disease in her father and mother  She  reports that she has never smoked  She has never used smokeless tobacco  She reports that she does not drink alcohol or use drugs  Current Outpatient Prescriptions   Medication Sig Dispense Refill    glimepiride (AMARYL) 4 mg tablet Take 1 tablet (4 mg total) by mouth 2 (two) times a day 180 tablet 1    lisinopril (ZESTRIL) 20 mg tablet TAKE 1 TABLET BY MOUTH ONCE DAILY 30 tablet 5    loratadine (CLARITIN) 10 mg tablet Take 1 tablet (10 mg total) by mouth daily 90 tablet 3    meloxicam (MOBIC) 15 mg tablet Take 1 tablet (15 mg total) by mouth daily 90 tablet 3    metFORMIN (GLUCOPHAGE) 1000 MG tablet TAKE 1 TABLET BY MOUTH EVERY 12 HOURS 60 tablet 5    ranitidine (ZANTAC) 300 MG capsule Take 1 capsule by mouth 2 (two) times a day OTC      spironolactone (ALDACTONE) 25 mg tablet TAKE 1 TABLET BY MOUTH ONCE DAILY 30 tablet 5    albuterol (PROVENTIL HFA,VENTOLIN HFA) 90 mcg/act inhaler Inhale 2 puffs every 6 (six) hours as needed for wheezing For 2 weeks, then PRN for shortness of breath  Please D/C Advair  (Patient not taking: Reported on 9/20/2018 ) 1 Inhaler 1    triamcinolone (KENALOG) 0 1 % cream Apply topically 2 (two) times a day 30 g 0     No current facility-administered medications for this visit        Current Outpatient Prescriptions on File Prior to Visit   Medication Sig    glimepiride (AMARYL) 4 mg tablet Take 1 tablet (4 mg total) by mouth 2 (two) times a day    lisinopril (ZESTRIL) 20 mg tablet TAKE 1 TABLET BY MOUTH ONCE DAILY    loratadine (CLARITIN) 10 mg tablet Take 1 tablet (10 mg total) by mouth daily    meloxicam (MOBIC) 15 mg tablet Take 1 tablet (15 mg total) by mouth daily    metFORMIN (GLUCOPHAGE) 1000 MG tablet TAKE 1 TABLET BY MOUTH EVERY 12 HOURS    ranitidine (ZANTAC) 300 MG capsule Take 1 capsule by mouth 2 (two) times a day OTC    spironolactone (ALDACTONE) 25 mg tablet TAKE 1 TABLET BY MOUTH ONCE DAILY    albuterol (PROVENTIL HFA,VENTOLIN HFA) 90 mcg/act inhaler Inhale 2 puffs every 6 (six) hours as needed for wheezing For 2 weeks, then PRN for shortness of breath  Please D/C Advair  (Patient not taking: Reported on 9/20/2018 )     No current facility-administered medications on file prior to visit  She is allergic to codeine; naproxen; sodium perborate; and sulfa antibiotics       Review of Systems   All other systems reviewed and are negative  Objective:      /68 (BP Location: Right arm, Patient Position: Sitting, Cuff Size: Large)   Pulse 81   Resp 16   Ht 5' (1 524 m)   Wt 100 kg (220 lb 12 8 oz)   SpO2 98%   BMI 43 12 kg/m²          Physical Exam   Constitutional: She is oriented to person, place, and time  She appears well-developed and well-nourished  Neck: Normal range of motion  Neck supple  Cardiovascular: Normal rate, regular rhythm, normal heart sounds and intact distal pulses  Pulmonary/Chest: Effort normal and breath sounds normal    Abdominal: Soft  Bowel sounds are normal    Musculoskeletal: Normal range of motion  Neurological: She is alert and oriented to person, place, and time  She has normal reflexes  Skin: Skin is warm and dry  Psychiatric: She has a normal mood and affect  Her behavior is normal  Judgment and thought content normal    Nursing note and vitals reviewed

## 2018-09-20 NOTE — PATIENT INSTRUCTIONS
Diabetes in the Older Adult   WHAT YOU NEED TO KNOW:   Older adults with diabetes are at risk for heart disease, stroke, kidney disease, blindness, and nerve damage  You may also be at risk for any of the following:  · Poor nutrition or low blood sugar levels    · Confusion or problems with memory, attention, or learning new things    · Trouble controlling urination or frequent urinary tract infections    · Trouble with coordination or balance    · Falls and injuries    · Pain    · Depression    · Open sores on your legs or feet  DISCHARGE INSTRUCTIONS:   Call 911 for any of the following:   · You have any of the following signs of a stroke:      ¨ Numbness or drooping on one side of your face     ¨ Weakness in an arm or leg    ¨ Confusion or difficulty speaking    ¨ Dizziness, a severe headache, or vision loss    · You have any of the following signs of a heart attack:      ¨ Squeezing, pressure, or pain in your chest that lasts longer than 5 minutes or returns    ¨ Discomfort or pain in your back, neck, jaw, stomach, or arm     ¨ Trouble breathing    ¨ Nausea or vomiting    ¨ Lightheadedness or a sudden cold sweat, especially with chest pain or trouble breathing  Return to the emergency department if:   · You have severe abdominal pain, or the pain spreads to your back  You may also be vomiting  · You have trouble staying awake or focusing  · You are shaking or sweating  · You have blurred or double vision  · Your breath has a fruity, sweet smell  · Your breathing is deep and labored, or rapid and shallow  · Your heartbeat is fast and weak  · You fall and get hurt  Contact your healthcare provider if:   · You are vomiting or have diarrhea  · You have an upset stomach and cannot eat the foods on your meal plan  · You feel weak or more tired than usual      · You feel dizzy, have headaches, or are easily irritated  · Your skin is red, warm, dry, or swollen       · You have a wound that does not heal      · You have numbness in your arms or legs  · You have trouble coping with your illness, or you feel anxious or depressed  · You have problems with your memory  · You have changes in your vision  · You have questions or concerns about your condition or care  Medicines  may be given to decrease the amount of sugar in your blood  You may also need medicine to lower your blood pressure or cholesterol, or medicine to prevent blood clots  Manage the ABCs and prevent problems caused by diabetes:   · Check your blood sugar levels as directed  Your healthcare provider will tell you when and how often to check during the day  Your healthcare provider will also tell you what your blood sugar levels should be before and after a meal  You may need to check for ketones in your urine or blood if your level is higher than directed  Write down your results and show them to your healthcare provider  Your provider may use the results to make changes to your medicine, food, or exercise schedules  Ask your healthcare provider for more information about how to treat a high or low blood sugar level  · Follow your meal plan as directed  A dietitian will help you make a meal plan to keep your blood sugar level steady and make sure you get enough nutrition  Do not skip meals  Your blood sugar level may drop too low if you have taken diabetes medicine and do not eat  Ask your healthcare provider about programs in your community that can deliver the meals to your home  · Try to be active for 30 to 60 minutes most days of the week  Exercise can help keep your blood sugar level steady, decrease your risk of heart disease, and help you lose weight  It can also help improve your balance and decrease your risk for falls  Work with your healthcare provider to create an exercise plan  Ask a family member or friend to exercise with you   Start slow and exercise for 5 to 10 minutes at a time  Examples of activities include walking or swimming  Include muscle strengthening activities 2 to 3 days each week  Include balance training 2 to 3 times each week  Activities that help increase balance include yoga and moises chi      · Maintain a healthy weight  Ask your healthcare provider how much you should weigh  A healthy weight can help you control your diabetes and prevent heart disease  Ask your provider to help you create a weight loss plan if you are overweight  Together you can set manageable weight loss goals  · Do not smoke  Ask your healthcare provider for information if you currently smoke and need help to quit  Do not use e-cigarettes or smokeless tobacco in place of cigarettes or to help you quit  They still contain nicotine  · Manage stress  Stress may increase your blood sugar level  Deep breathing, muscle relaxation, and music may help you relax  Ask your healthcare provider for more information about these practices  Other ways to manage your diabetes:   · Check your feet every day for sores  Look at your whole foot, including the bottom, and between and under your toes  Check for wounds, corns, and calluses  Use a mirror to see the bottom of your feet  The skin on your feet may be shiny, tight, dry, or darker than normal  Your feet may also be cold and pale  Feel your feet by running your hands along the tops, bottoms, sides, and between your toes  Redness, swelling, and warmth are signs of blood flow problems that can lead to a foot ulcer  Do not try to remove corns or calluses yourself  · Wear medical alert identification  Wear medical alert jewelry or carry a card that says you have diabetes  Ask your healthcare provider where to get these items  · Ask about vaccines  You have a higher risk for serious illness if you get the flu, pneumonia, or hepatitis   Ask your healthcare provider if you should get a flu, pneumonia, shingles, or hepatitis B vaccine, and when to get the vaccine  · Keep all appointments  You may need to return to have your A1c checked every 3 months  You will need to return at least once each year to have your feet checked  You will need an eye exam once a year to check for retinopathy  You will also need urine tests every year to check for kidney problems  You may need tests to monitor for heart disease  Write down your questions so you remember to ask them during your visits  · Get help from family and friends  You may need help checking your blood sugar level, giving insulin injections, or preparing your meals  Ask your family and friends to help you with these tasks  Talk to your healthcare provider if you do not have someone at home to help you  A healthcare provider can come to your home to help you with these tasks  Follow up with your healthcare provider as directed: You may need to return to have your A1c checked every 3 months  You will need to return at least once each year to have your feet checked  You will need an eye exam once a year to check for retinopathy  You will also need urine tests every year to check for kidney problems  You may need tests to monitor for heart disease  Write down your questions so you remember to ask them during your visits  © 2017 [address] [employer] Information is for End User's use only and may not be sold, redistributed or otherwise used for commercial purposes  All illustrations and images included in CareNotes® are the copyrighted property of A D A M , Inc  or Navid Shah  The above information is an  only  It is not intended as medical advice for individual conditions or treatments  Talk to your doctor, nurse or pharmacist before following any medical regimen to see if it is safe and effective for you

## 2018-09-20 NOTE — PROGRESS NOTES
Diabetic Foot Exam    Patient's shoes and socks removed  Right Foot/Ankle   Right Foot Inspection  Skin Exam: skin normal and skin intact no dry skin, no warmth, no callus, no erythema, no maceration, no abnormal color, no pre-ulcer, no ulcer and no callus                          Toe Exam: ROM and strength within normal limits  Sensory   Vibration: intact  Proprioception: intact   Monofilament testing: intact  Vascular  Capillary refills: < 3 seconds  The right DP pulse is 2+  The right PT pulse is 2+  Left Foot/Ankle  Left Foot Inspection  Skin Exam: skin normal and skin intactno dry skin, no warmth, no erythema, no maceration, normal color, no pre-ulcer, no ulcer and no callus                         Toe Exam: ROM and strength within normal limits                   Sensory   Vibration: intact  Proprioception: intact  Monofilament: intact  Vascular  Capillary refills: < 3 seconds  The left DP pulse is 2+  The left PT pulse is 2+  Assign Risk Category:  No deformity present; No loss of protective sensation;  No weak pulses       Risk: 0

## 2018-12-29 DIAGNOSIS — I10 ESSENTIAL HYPERTENSION: Primary | ICD-10-CM

## 2018-12-29 DIAGNOSIS — E11.9 TYPE 2 DIABETES MELLITUS WITHOUT COMPLICATION, WITHOUT LONG-TERM CURRENT USE OF INSULIN (HCC): ICD-10-CM

## 2018-12-29 DIAGNOSIS — E11.65 TYPE 2 DIABETES MELLITUS WITH HYPERGLYCEMIA, WITHOUT LONG-TERM CURRENT USE OF INSULIN (HCC): ICD-10-CM

## 2018-12-29 RX ORDER — GLIMEPIRIDE 4 MG/1
TABLET ORAL
Qty: 180 TABLET | Refills: 1 | Status: CANCELLED | OUTPATIENT
Start: 2018-12-29

## 2019-01-02 DIAGNOSIS — E11.65 TYPE 2 DIABETES MELLITUS WITH HYPERGLYCEMIA, WITHOUT LONG-TERM CURRENT USE OF INSULIN (HCC): ICD-10-CM

## 2019-01-02 RX ORDER — GLIMEPIRIDE 4 MG/1
4 TABLET ORAL 2 TIMES DAILY
Qty: 180 TABLET | Refills: 0 | Status: SHIPPED | OUTPATIENT
Start: 2019-01-02 | End: 2019-03-26 | Stop reason: SDUPTHER

## 2019-01-02 RX ORDER — LISINOPRIL 20 MG/1
20 TABLET ORAL DAILY
Qty: 30 TABLET | Refills: 5 | Status: SHIPPED | OUTPATIENT
Start: 2019-01-02 | End: 2019-03-04 | Stop reason: SDUPTHER

## 2019-01-02 RX ORDER — SPIRONOLACTONE 25 MG/1
25 TABLET ORAL DAILY
Qty: 30 TABLET | Refills: 5 | Status: SHIPPED | OUTPATIENT
Start: 2019-01-02 | End: 2019-03-04 | Stop reason: SDUPTHER

## 2019-03-04 DIAGNOSIS — I10 ESSENTIAL HYPERTENSION: ICD-10-CM

## 2019-03-04 DIAGNOSIS — E11.9 TYPE 2 DIABETES MELLITUS WITHOUT COMPLICATION, WITHOUT LONG-TERM CURRENT USE OF INSULIN (HCC): ICD-10-CM

## 2019-03-04 DIAGNOSIS — E11.65 TYPE 2 DIABETES MELLITUS WITH HYPERGLYCEMIA, WITHOUT LONG-TERM CURRENT USE OF INSULIN (HCC): ICD-10-CM

## 2019-03-04 RX ORDER — SPIRONOLACTONE 25 MG/1
25 TABLET ORAL DAILY
Qty: 30 TABLET | Refills: 5 | Status: SHIPPED | OUTPATIENT
Start: 2019-03-04 | End: 2020-02-06

## 2019-03-04 RX ORDER — LISINOPRIL 20 MG/1
20 TABLET ORAL DAILY
Qty: 30 TABLET | Refills: 5 | Status: SHIPPED | OUTPATIENT
Start: 2019-03-04 | End: 2020-01-27

## 2019-03-25 DIAGNOSIS — R52 PAIN: ICD-10-CM

## 2019-03-26 DIAGNOSIS — E11.65 TYPE 2 DIABETES MELLITUS WITH HYPERGLYCEMIA, WITHOUT LONG-TERM CURRENT USE OF INSULIN (HCC): ICD-10-CM

## 2019-03-26 RX ORDER — MELOXICAM 15 MG/1
TABLET ORAL
Qty: 90 TABLET | Refills: 3 | Status: SHIPPED | OUTPATIENT
Start: 2019-03-26 | End: 2020-03-19 | Stop reason: SDUPTHER

## 2019-03-27 RX ORDER — GLIMEPIRIDE 4 MG/1
TABLET ORAL
Qty: 180 TABLET | Refills: 0 | Status: SHIPPED | OUTPATIENT
Start: 2019-03-27 | End: 2019-07-04 | Stop reason: SDUPTHER

## 2019-05-30 DIAGNOSIS — E11.9 TYPE 2 DIABETES MELLITUS WITHOUT COMPLICATION, WITHOUT LONG-TERM CURRENT USE OF INSULIN (HCC): Primary | ICD-10-CM

## 2019-07-04 DIAGNOSIS — E11.65 TYPE 2 DIABETES MELLITUS WITH HYPERGLYCEMIA, WITHOUT LONG-TERM CURRENT USE OF INSULIN (HCC): ICD-10-CM

## 2019-07-05 RX ORDER — GLIMEPIRIDE 4 MG/1
TABLET ORAL
Qty: 180 TABLET | Refills: 0 | Status: SHIPPED | OUTPATIENT
Start: 2019-07-05 | End: 2019-10-06 | Stop reason: SDUPTHER

## 2019-09-05 ENCOUNTER — OFFICE VISIT (OUTPATIENT)
Dept: FAMILY MEDICINE CLINIC | Facility: CLINIC | Age: 59
End: 2019-09-05
Payer: COMMERCIAL

## 2019-09-05 VITALS
DIASTOLIC BLOOD PRESSURE: 64 MMHG | HEIGHT: 60 IN | OXYGEN SATURATION: 98 % | RESPIRATION RATE: 18 BRPM | TEMPERATURE: 97.4 F | BODY MASS INDEX: 43.51 KG/M2 | WEIGHT: 221.6 LBS | HEART RATE: 74 BPM | SYSTOLIC BLOOD PRESSURE: 114 MMHG

## 2019-09-05 DIAGNOSIS — Z01.00 DIABETIC EYE EXAM (HCC): ICD-10-CM

## 2019-09-05 DIAGNOSIS — E11.9 DIABETIC EYE EXAM (HCC): ICD-10-CM

## 2019-09-05 DIAGNOSIS — K13.79 MOUTH PAIN: Primary | ICD-10-CM

## 2019-09-05 DIAGNOSIS — E78.2 MIXED HYPERLIPIDEMIA: ICD-10-CM

## 2019-09-05 DIAGNOSIS — H57.9 BLOODSHOT EYE: ICD-10-CM

## 2019-09-05 DIAGNOSIS — Z13.29 SCREENING FOR HYPOTHYROIDISM: ICD-10-CM

## 2019-09-05 DIAGNOSIS — E11.9 TYPE 2 DIABETES MELLITUS WITHOUT COMPLICATION, WITHOUT LONG-TERM CURRENT USE OF INSULIN (HCC): ICD-10-CM

## 2019-09-05 PROBLEM — J30.2 SEASONAL ALLERGIES: Status: ACTIVE | Noted: 2017-12-07

## 2019-09-05 PROBLEM — K21.9 GERD WITHOUT ESOPHAGITIS: Status: ACTIVE | Noted: 2017-09-15

## 2019-09-05 PROCEDURE — 3008F BODY MASS INDEX DOCD: CPT | Performed by: NURSE PRACTITIONER

## 2019-09-05 PROCEDURE — 99213 OFFICE O/P EST LOW 20 MIN: CPT | Performed by: NURSE PRACTITIONER

## 2019-09-05 NOTE — LETTER
September 5, 2019     Patient: Monty Blancas   YOB: 1960   Date of Visit: 9/5/2019       To Whom it May Concern:    Susy Brown is under my professional care  She was seen in my office on 9/5/2019  She may return to work on 9/5/19  Patient was not given any restrictions and is cleared to work in food preparation  If you have any questions or concerns, please don't hesitate to call           Sincerely,          STEPHENIE Vale        CC: No Recipients

## 2019-09-05 NOTE — PROGRESS NOTES
Assessment/Plan:     Diagnoses and all orders for this visit:    Mouth pain  Comments:  Check labs and consider ENT referral if WNL  Orders:  -     CBC and differential; Future  -     Vitamin B12; Future  -     Ambulatory referral to Ophthalmology; Future  -     Iron Panel (Includes Ferritin, Iron Sat%, Iron, and TIBC); Future  -     Discontinue: al mag oxide-diphenhydramine-lidocaine viscous (MAGIC MOUTHWASH) 1:1:1 suspension; Swish and spit 10 mL every 4 (four) hours as needed for mouth pain or discomfort  -     al mag oxide-diphenhydramine-lidocaine viscous (MAGIC MOUTHWASH) 1:1:1 suspension; Swish and spit 10 mL every 4 (four) hours as needed for mouth pain or discomfort    Bloodshot eye  Comments:  Reinforced importance of annual diabetic eye exam    Diabetic eye exam ([facility], [address] )  -     Ambulatory referral to Ophthalmology; Future    Type 2 diabetes mellitus without complication, without long-term current use of insulin (HCC)  -     Comprehensive metabolic panel; Future  -     TSH, 3rd generation; Future  -     Microalbumin / creatinine urine ratio    Screening for hypothyroidism  -     TSH, 3rd generation; Future    Mixed hyperlipidemia  -     Lipid panel; Future    Other orders  -     Cancel: Hemoglobin A1C; Future        Subjective:      Patient ID: Lorel Severance is a 59 y o  female  Patient presents to [address] [place] [address] with complaints of mouth pain  Allergies, medical history and current medications reviewed with patient; patient reports taking all medications as prescribed without issues  Patient C/O an "inflammed mouth," ongoing since Sunday  Patient states she is unable to associated the start of her symptoms with any changes in diet or routine  Patient reports she used OTC Benadryl, which was ineffective  Patient also reports she noticed her left eye was bloodshot yesterday; patient denies any pain or visual disturbance  Patient reports last diabetic eye exam was 1 year ago       Patient Care Team:  Vivien Leyva MD as PCP - General    Review of Systems   HENT: Positive for mouth sores  Eyes: Positive for redness  All other systems reviewed and are negative  Objective:    /64 (BP Location: Left arm, Patient Position: Sitting, Cuff Size: Large)   Pulse 74   Temp (!) 97 4 °F (36 3 °C) (Temporal)   Resp 18   Ht 5' (1 524 m)   Wt 101 kg (221 lb 9 6 oz)   SpO2 98%   BMI 43 28 kg/m²      Physical Exam   Constitutional: She is oriented to person, place, and time  She appears well-developed and well-nourished  No distress  HENT:   Head: Normocephalic and atraumatic  Right Ear: Tympanic membrane, external ear and ear canal normal    Left Ear: Tympanic membrane, external ear and ear canal normal    Nose: Nose normal  No mucosal edema  Mouth/Throat: Uvula is midline and oropharynx is clear and moist  Oral lesions (inflammation of mucosa to inner lips and sublingual region) present  Nasal turbinates pink, moist and without exudate  Eyes: Lids are normal  Left eye exhibits no chemosis and no discharge  Right conjunctiva has no hemorrhage  Left conjunctiva has a hemorrhage  Neck: Normal range of motion  No tracheal deviation present  Cardiovascular: Normal rate  Pulmonary/Chest: Effort normal  No respiratory distress  Abdominal: Normal appearance  She exhibits no distension  There is no guarding  Musculoskeletal: Normal range of motion  Neurological: She is alert and oriented to person, place, and time  Skin: Skin is warm, dry and intact  Psychiatric: She has a normal mood and affect  Her speech is normal    Nursing note and vitals reviewed  BMI Counseling: Body mass index is 43 28 kg/m²  Did not discuss the patient's BMI with her  The BMI is above average  BMI counseling and education was provided to the patient   Nutrition recommendations include reducing portion sizes, 3-5 servings of fruits/vegetables daily, consuming healthier snacks, moderation in carbohydrate intake, reducing intake of saturated fat and trans fat and reducing intake of cholesterol  Exercise recommendations include exercising 3-5 times per week  The above recommendations were included in patient instructions

## 2019-09-05 NOTE — PATIENT INSTRUCTIONS
Mouth Care   WHAT YOU NEED TO KNOW:   Why is mouth care important? Mouth care prevents infection, plaque, bleeding gums, mouth sores, and cavities  It also freshens breath and improves appetite  Do mouth care in the morning, after each meal, and before bed each night  You may need more frequent mouth care if your mouth is in poor condition  What items will I need to do mouth care? · An electric or manual toothbrush    · Toothpaste, dental sticks, and floss    · A cup of water for rinsing    · Mouthwash     · Water-based lip balm or moisturizer  How do I brush my teeth? · Wet the toothbrush and place a small amount of toothpaste on it  · Gently place the brush on each tooth, and move it in a [place]  Do not press too hard  This may injure your gums  · Clean the inner, outer, and top surfaces of your teeth  Brush your gums and the top of your tongue  · Swish the water in your mouth and spit it out  Repeat this step with mouthwash  · Dry around your mouth  Apply water-based lip balm or moisturizer to your lips to prevent cracking and dryness  How do I floss my teeth? Thread the floss between each tooth, but do not push down on the gums too hard  This can cause gum damage  Make sure to floss on each side of every tooth  You may need to use waxed floss for easier movement between your teeth  What if I wear dentures? Remove the dentures from your mouth before you clean them  Moist dentures come out more easily  Drink a sip of water before you remove your dentures  Gently rock the dentures from side to side to loosen them  Then pull them out  · Brush the dentures with clean water and denture  or toothpaste  · Brush the dentures on all surfaces with cool water  Do not use hot water  Hot water could damage the dentures  Be careful not to bend any clasps on the dentures as you brush them  Rinse them  Place a thin layer of denture adhesive on the dentures and put them back in your mouth  Ask your healthcare provider which adhesive to use  · Soak the dentures in a denture solution each night after you brush them  Rinse them in cold water before you place them back in your mouth  When should I contact my healthcare provider? · You develop mouth sores  · Your dentures do not fit well  · You have pain with brushing  · You have questions or concerns about your condition or care  CARE AGREEMENT:   You have the right to help plan your care  Learn about your health condition and how it may be treated  Discuss treatment options with your caregivers to decide what care you want to receive  You always have the right to refuse treatment  The above information is an  only  It is not intended as medical advice for individual conditions or treatments  Talk to your doctor, nurse or pharmacist before following any medical regimen to see if it is safe and effective for you  © 2017 [employer]  Information is for End User's use only and may not be sold, redistributed or otherwise used for commercial purposes  All illustrations and images included in CareNotes® are the copyrighted property of [employer]  or Navid Shah  Obesity   WHAT YOU NEED TO KNOW:   What is obesity? Obesity is when your body mass index (BMI) is greater than 30  Your healthcare provider will use your height and weight to measure your BMI  What are the risks of obesity? Obesity can cause many health problems, including injuries or physical disability  You may need tests to check for the following:  · Diabetes     · High blood pressure or high cholesterol     · Heart disease     · Gallbladder or liver disease     · Cancer of the colon, breast, prostate, liver, or kidney     · Sleep apnea     · Arthritis or gout  How is obesity treated? The goal of treatment is to help you lose weight so your health will improve   Even a small decrease in BMI can reduce the risk for many health problems  Your healthcare provider will help you set a weight-loss goal   · Lifestyle changes  are the first step in treating obesity  These include making healthy food choices and getting regular physical activity  Your healthcare provider may suggest a weight-loss program that involves coaching, education, and therapy  · Medicine  may help you lose weight when it is used with a healthy diet and physical activity  · Surgery  can help you lose weight if you are very obese and have other health problems  There are several types of weight-loss surgery  Ask your healthcare provider for more information  How can I be successful at losing weight? · Set small, realistic goals  An example of a small goal is to walk for 20 minutes 5 days a week  Anther goal is to lose 5% of your body weight  · Tell friends, family members, and coworkers about your goals  and ask for their support  Ask a friend to lose weight with you, or join a weight-loss support group  · Identify foods or triggers that may cause you to overeat , and find ways to avoid them  Remove tempting high-calorie foods from your home and workplace  Place a bowl of fresh fruit on your kitchen counter  If stress causes you to eat, then find other ways to cope with stress  · Keep a diary to track what you eat and drink  Also write down how many minutes of physical activity you do each day  Weigh yourself once a week and record it in your diary  What eating changes should I make? You will need to eat 500 to 1,000 fewer calories each day than you currently eat to lose 1 to 2 pounds a week  The following changes will help you cut calories:  · Eat smaller portions  Use small plates, no larger than 9 inches in diameter  Fill your plate half full of fruits and vegetables  Measure your food using measuring cups until you know what a serving size looks like  · Eat 3 meals and 1 or 2 snacks each day  Plan your meals in advance   Cook and eat at home most of the time  Eat slowly  · Eat fruits and vegetables at every meal   They are low in calories and high in fiber, which makes you feel full  Do not add butter, margarine, or cream sauce to vegetables  Use herbs to season steamed vegetables  · Eat less fat and fewer fried foods  Eat more baked or grilled chicken and fish  These protein sources are lower in calories and fat than red meat  Limit fast food  Dress your salads with olive oil and vinegar instead of bottled dressing  · Limit the amount of sugar you eat  Do not drink sugary beverages  Limit alcohol  What activity changes should I make? Physical activity is good for your body in many ways  It helps you burn calories and build strong muscles  It decreases stress and depression, and improves your mood  It can also help you sleep better  Talk to your healthcare provider before you begin an exercise program   · Exercise for at least 30 minutes 5 days a week  Start slowly  Set aside time each day for physical activity that you enjoy and that is convenient for you  It is best to do both weight training and an activity that increases your heart rate, such as walking, bicycling, or swimming  · Find ways to be more active  Do yard work and housecleaning  Walk up the stairs instead of using elevators  Spend your leisure time going to events that require walking, such as outdoor festivals or fairs  This extra physical activity can help you lose weight and keep it off  When should I seek immediate care? · You have a severe headache, confusion, or difficulty speaking  · You have weakness on one side of your body  · You have chest pain, sweating, or shortness of breath  When should I contact my healthcare provider? · You have symptoms of gallbladder or liver disease, such as pain in your upper abdomen  · You have knee or hip pain and discomfort while walking       · You have symptoms of diabetes, such as intense hunger and thirst, and frequent urination  · You have symptoms of sleep apnea, such as snoring or daytime sleepiness  · You have questions or concerns about your condition or care  CARE AGREEMENT:   You have the right to help plan your care  Learn about your health condition and how it may be treated  Discuss treatment options with your caregivers to decide what care you want to receive  You always have the right to refuse treatment  The above information is an  only  It is not intended as medical advice for individual conditions or treatments  Talk to your doctor, nurse or pharmacist before following any medical regimen to see if it is safe and effective for you  © 2017 [employer]  Information is for End User's use only and may not be sold, redistributed or otherwise used for commercial purposes  All illustrations and images included in CareNotes® are the copyrighted property of [employer]  or Navid Shah

## 2019-09-13 LAB
ALBUMIN SERPL-MCNC: 4.2 G/DL (ref 3.6–5.1)
ALBUMIN/CREAT UR: 9 MCG/MG CREAT
ALBUMIN/GLOB SERPL: 1.5 (CALC) (ref 1–2.5)
ALP SERPL-CCNC: 43 U/L (ref 33–130)
ALT SERPL-CCNC: 13 U/L (ref 6–29)
AST SERPL-CCNC: 12 U/L (ref 10–35)
BASOPHILS # BLD AUTO: 60 CELLS/UL (ref 0–200)
BASOPHILS NFR BLD AUTO: 1 %
BILIRUB SERPL-MCNC: 0.4 MG/DL (ref 0.2–1.2)
BUN SERPL-MCNC: 37 MG/DL (ref 7–25)
BUN/CREAT SERPL: 29 (CALC) (ref 6–22)
CALCIUM SERPL-MCNC: 9.6 MG/DL (ref 8.6–10.4)
CHLORIDE SERPL-SCNC: 107 MMOL/L (ref 98–110)
CHOLEST SERPL-MCNC: 193 MG/DL
CHOLEST/HDLC SERPL: 3.8 (CALC)
CO2 SERPL-SCNC: 23 MMOL/L (ref 20–32)
CREAT SERPL-MCNC: 1.28 MG/DL (ref 0.5–0.99)
CREAT UR-MCNC: 32 MG/DL (ref 20–275)
EOSINOPHIL # BLD AUTO: 180 CELLS/UL (ref 15–500)
EOSINOPHIL NFR BLD AUTO: 3 %
ERYTHROCYTE [DISTWIDTH] IN BLOOD BY AUTOMATED COUNT: 12.8 % (ref 11–15)
EST. AVERAGE GLUCOSE BLD GHB EST-MCNC: 143 (CALC)
EST. AVERAGE GLUCOSE BLD GHB EST-SCNC: 7.9 (CALC)
FERRITIN SERPL-MCNC: 94 NG/ML (ref 16–288)
GLOBULIN SER CALC-MCNC: 2.8 G/DL (CALC) (ref 1.9–3.7)
GLUCOSE SERPL-MCNC: 79 MG/DL (ref 65–99)
HBA1C MFR BLD: 6.6 % OF TOTAL HGB
HCT VFR BLD AUTO: 34.3 % (ref 35–45)
HDLC SERPL-MCNC: 51 MG/DL
HGB BLD-MCNC: 11.3 G/DL (ref 11.7–15.5)
IRON SATN MFR SERPL: 20 % (CALC) (ref 16–45)
IRON SERPL-MCNC: 59 MCG/DL (ref 45–160)
LDLC SERPL CALC-MCNC: 123 MG/DL (CALC)
LYMPHOCYTES # BLD AUTO: 1668 CELLS/UL (ref 850–3900)
LYMPHOCYTES NFR BLD AUTO: 27.8 %
MCH RBC QN AUTO: 29.4 PG (ref 27–33)
MCHC RBC AUTO-ENTMCNC: 32.9 G/DL (ref 32–36)
MCV RBC AUTO: 89.3 FL (ref 80–100)
MICROALBUMIN UR-MCNC: 0.3 MG/DL
MONOCYTES # BLD AUTO: 600 CELLS/UL (ref 200–950)
MONOCYTES NFR BLD AUTO: 10 %
NEUTROPHILS # BLD AUTO: 3492 CELLS/UL (ref 1500–7800)
NEUTROPHILS NFR BLD AUTO: 58.2 %
NONHDLC SERPL-MCNC: 142 MG/DL (CALC)
PLATELET # BLD AUTO: 181 THOUSAND/UL (ref 140–400)
PMV BLD REES-ECKER: 10.5 FL (ref 7.5–12.5)
POTASSIUM SERPL-SCNC: 4.7 MMOL/L (ref 3.5–5.3)
PROT SERPL-MCNC: 7 G/DL (ref 6.1–8.1)
RBC # BLD AUTO: 3.84 MILLION/UL (ref 3.8–5.1)
SL AMB EGFR AFRICAN AMERICAN: 52 ML/MIN/1.73M2
SL AMB EGFR NON AFRICAN AMERICAN: 45 ML/MIN/1.73M2
SODIUM SERPL-SCNC: 139 MMOL/L (ref 135–146)
TIBC SERPL-MCNC: 295 MCG/DL (CALC) (ref 250–450)
TRIGL SERPL-MCNC: 90 MG/DL
TSH SERPL-ACNC: 3.78 MIU/L (ref 0.4–4.5)
VIT B12 SERPL-MCNC: 819 PG/ML (ref 200–1100)
WBC # BLD AUTO: 6 THOUSAND/UL (ref 3.8–10.8)

## 2019-09-20 ENCOUNTER — TELEPHONE (OUTPATIENT)
Dept: FAMILY MEDICINE CLINIC | Facility: CLINIC | Age: 59
End: 2019-09-20

## 2019-09-20 PROBLEM — R94.4 DECREASED GFR: Status: ACTIVE | Noted: 2019-09-20

## 2019-10-06 DIAGNOSIS — E11.65 TYPE 2 DIABETES MELLITUS WITH HYPERGLYCEMIA, WITHOUT LONG-TERM CURRENT USE OF INSULIN (HCC): ICD-10-CM

## 2019-10-06 RX ORDER — GLIMEPIRIDE 4 MG/1
TABLET ORAL
Qty: 180 TABLET | Refills: 0 | Status: SHIPPED | OUTPATIENT
Start: 2019-10-06 | End: 2020-01-03 | Stop reason: SDUPTHER

## 2019-11-23 DIAGNOSIS — Z12.31 BREAST CANCER SCREENING BY MAMMOGRAM: Primary | ICD-10-CM

## 2019-12-12 ENCOUNTER — TELEPHONE (OUTPATIENT)
Dept: FAMILY MEDICINE CLINIC | Facility: CLINIC | Age: 59
End: 2019-12-12

## 2020-01-03 DIAGNOSIS — E11.65 TYPE 2 DIABETES MELLITUS WITH HYPERGLYCEMIA, WITHOUT LONG-TERM CURRENT USE OF INSULIN (HCC): ICD-10-CM

## 2020-01-03 RX ORDER — GLIMEPIRIDE 4 MG/1
4 TABLET ORAL 2 TIMES DAILY
Qty: 180 TABLET | Refills: 0 | Status: SHIPPED | OUTPATIENT
Start: 2020-01-03 | End: 2020-03-23

## 2020-01-27 ENCOUNTER — OFFICE VISIT (OUTPATIENT)
Dept: FAMILY MEDICINE CLINIC | Facility: CLINIC | Age: 60
End: 2020-01-27

## 2020-01-27 VITALS
HEART RATE: 73 BPM | DIASTOLIC BLOOD PRESSURE: 64 MMHG | SYSTOLIC BLOOD PRESSURE: 118 MMHG | WEIGHT: 223 LBS | HEIGHT: 60 IN | BODY MASS INDEX: 43.78 KG/M2 | OXYGEN SATURATION: 96 %

## 2020-01-27 DIAGNOSIS — I10 HYPERTENSION, ESSENTIAL: ICD-10-CM

## 2020-01-27 DIAGNOSIS — E78.2 MIXED HYPERLIPIDEMIA: ICD-10-CM

## 2020-01-27 DIAGNOSIS — Z79.4 TYPE 2 DIABETES MELLITUS WITH OTHER SPECIFIED COMPLICATION, WITH LONG-TERM CURRENT USE OF INSULIN (HCC): Primary | ICD-10-CM

## 2020-01-27 DIAGNOSIS — E66.01 MORBID OBESITY WITH BMI OF 40.0-44.9, ADULT (HCC): ICD-10-CM

## 2020-01-27 DIAGNOSIS — M25.50 ARTHRALGIA, UNSPECIFIED JOINT: ICD-10-CM

## 2020-01-27 DIAGNOSIS — E11.69 TYPE 2 DIABETES MELLITUS WITH OTHER SPECIFIED COMPLICATION, WITH LONG-TERM CURRENT USE OF INSULIN (HCC): Primary | ICD-10-CM

## 2020-01-27 DIAGNOSIS — R52 PAIN: ICD-10-CM

## 2020-01-27 LAB — SL AMB POCT HEMOGLOBIN AIC: 6.6 (ref ?–6.5)

## 2020-01-27 PROCEDURE — 83036 HEMOGLOBIN GLYCOSYLATED A1C: CPT | Performed by: FAMILY MEDICINE

## 2020-01-27 PROCEDURE — 99214 OFFICE O/P EST MOD 30 MIN: CPT | Performed by: FAMILY MEDICINE

## 2020-01-27 RX ORDER — LISINOPRIL 10 MG/1
10 TABLET ORAL DAILY
Qty: 90 TABLET | Refills: 3 | Status: SHIPPED | OUTPATIENT
Start: 2020-01-27 | End: 2021-02-02

## 2020-01-27 RX ORDER — METOPROLOL TARTRATE 100 MG/1
TABLET ORAL
COMMUNITY
End: 2022-03-28

## 2020-01-27 RX ORDER — IBUPROFEN 800 MG/1
TABLET ORAL
COMMUNITY
End: 2020-01-27 | Stop reason: SDUPTHER

## 2020-01-27 RX ORDER — ATORVASTATIN CALCIUM 40 MG/1
40 TABLET, FILM COATED ORAL DAILY
Qty: 90 TABLET | Refills: 3 | Status: SHIPPED | OUTPATIENT
Start: 2020-01-27 | End: 2022-03-28

## 2020-01-27 NOTE — PROGRESS NOTES
Assessment/Plan:    No problem-specific Assessment & Plan notes found for this encounter  Diagnoses and all orders for this visit:    Type 2 diabetes mellitus with other specified complication, with long-term current use of insulin ([facility])  -     POCT hemoglobin A1c  -     lisinopril (ZESTRIL) 10 mg tablet; Take 1 tablet (10 mg total) by mouth daily  -     atorvastatin (LIPITOR) 40 mg tablet; Take 1 tablet (40 mg total) by mouth daily    Hypertension, essential  -     lisinopril (ZESTRIL) 10 mg tablet; Take 1 tablet (10 mg total) by mouth daily  -     atorvastatin (LIPITOR) 40 mg tablet; Take 1 tablet (40 mg total) by mouth daily    Mixed hyperlipidemia  -     atorvastatin (LIPITOR) 40 mg tablet; Take 1 tablet (40 mg total) by mouth daily    Morbid obesity with BMI of 40 0-44 9, adult ([facility])    Arthralgia, unspecified joint  -     RF Screen w/ Reflex to Titer; Future  -     NELIA Screen w/ Reflex to Titer/Pattern; Future  -     Cyclic citrul peptide antibody, IgG; Future  -     Sedimentation rate, automated; Future    Pain    Other orders  -     metoprolol tartrate (LOPRESSOR) 100 mg tablet; Take by mouth  -     Discontinue: ibuprofen (MOTRIN) 800 mg tablet          Subjective:      Patient ID: Supriya Glasgow is a 62 y o  female  She has hyperlipidemia  She declines a statin medication at this time  We have discussed the role of statin medications and type 2 diabetes mellitus  She does not believe that the benefits outweigh the risks  Her blood pressure is under excellent control on 10 mg of lisinopril  She has no cough  She has no chest pain or shortness of breath  She has no headache or vision changes  She has no PND, orthopnea, or dyspnea on exertion  She has type 2 diabetes mellitus  Her A1c is under excellent control at 6 6 percent in the office today  She is on a combination of metformin and Amaryl  She has no side effects from her current regimen    She has had 2 low blood sugars 1 in the 40s and 1 in the 46s  She recognizes the signs and symptoms of hypoglycemia and knows how to treat them  Her daughter has recently been diagnosed with rheumatoid arthritis  She has various swollen joints and arthralgias  She states that she has been having the symptoms for 30 years  She has morning stiffness lasting greater than 30 minutes to an hour on most days  She has upper respiratory tract infection symptoms such as runny nose, cough, sneezing and mild sore throat  She has been sick for less than 1 week  She has no fevers or chills  She has pain on the inside of her right knee  It comes and goes  Sometimes it swells  It does not get warm or red  The following portions of the patient's history were reviewed and updated as appropriate:   She  has a past medical history of Arthritis, Bursitis of both feet, Carpal tunnel syndrome, bilateral, Diabetes mellitus ([facility] ), and Hypertension  She   Patient Active Problem List    Diagnosis Date Noted    Decreased GFR 09/20/2019    Morbid obesity with BMI of 40 0-44 9, adult ([place] [address] ) 09/20/2018    Rash 09/20/2018    Hyperlipidemia 12/07/2017    Seasonal allergies 12/07/2017    DM type 2 (diabetes mellitus, type 2) ([place] [address] ) 09/15/2017    Hypertension, essential 09/15/2017    GERD without esophagitis 09/15/2017     She  has a past surgical history that includes [place] tooth extraction  Her family history includes Cancer in her brother, father, and mother; Diabetes in her family, mother, and sister; Heart disease in her father and mother  She  reports that she has never smoked  She has never used smokeless tobacco  She reports that she does not drink alcohol or use drugs    Current Outpatient Medications   Medication Sig Dispense Refill    glimepiride (AMARYL) 4 mg tablet Take 1 tablet (4 mg total) by mouth 2 (two) times a day 180 tablet 0    meloxicam (MOBIC) 15 mg tablet TAKE 1 TABLET BY MOUTH ONCE DAILY 90 tablet 3    metFORMIN (GLUCOPHAGE) 1000 MG tablet Take 1 tablet (1,000 mg total) by mouth every 12 (twelve) hours 60 tablet 5    ranitidine (ZANTAC) 300 MG capsule Take 1 capsule by mouth 2 (two) times a day OTC      spironolactone (ALDACTONE) 25 mg tablet Take 1 tablet (25 mg total) by mouth daily 30 tablet 5    atorvastatin (LIPITOR) 40 mg tablet Take 1 tablet (40 mg total) by mouth daily 90 tablet 3    lisinopril (ZESTRIL) 10 mg tablet Take 1 tablet (10 mg total) by mouth daily 90 tablet 3    metoprolol tartrate (LOPRESSOR) 100 mg tablet Take by mouth      triamcinolone (KENALOG) 0 1 % cream Apply topically 2 (two) times a day (Patient not taking: Reported on 1/27/2020) 30 g 0     No current facility-administered medications for this visit  Current Outpatient Medications on File Prior to Visit   Medication Sig    glimepiride (AMARYL) 4 mg tablet Take 1 tablet (4 mg total) by mouth 2 (two) times a day    meloxicam (MOBIC) 15 mg tablet TAKE 1 TABLET BY MOUTH ONCE DAILY    metFORMIN (GLUCOPHAGE) 1000 MG tablet Take 1 tablet (1,000 mg total) by mouth every 12 (twelve) hours    ranitidine (ZANTAC) 300 MG capsule Take 1 capsule by mouth 2 (two) times a day OTC    spironolactone (ALDACTONE) 25 mg tablet Take 1 tablet (25 mg total) by mouth daily    [DISCONTINUED] lisinopril (ZESTRIL) 20 mg tablet Take 1 tablet (20 mg total) by mouth daily    metoprolol tartrate (LOPRESSOR) 100 mg tablet Take by mouth    triamcinolone (KENALOG) 0 1 % cream Apply topically 2 (two) times a day (Patient not taking: Reported on 1/27/2020)    [DISCONTINUED] al [place] oxide-diphenhydramine-lidocaine viscous (MAGIC MOUTHWASH) 1:1:1 suspension Swish and spit 10 mL every 4 (four) hours as needed for mouth pain or discomfort (Patient not taking: Reported on 1/27/2020)    [DISCONTINUED] albuterol (PROVENTIL HFA,VENTOLIN HFA) 90 mcg/act inhaler Inhale 2 puffs every 6 (six) hours as needed for wheezing For 2 weeks, then PRN for shortness of breath  Please D/C Advair  (Patient not taking: Reported on 9/5/2019)    [DISCONTINUED] ibuprofen (MOTRIN) 800 mg tablet     [DISCONTINUED] loratadine (CLARITIN) 10 mg tablet Take 1 tablet (10 mg total) by mouth daily (Patient not taking: Reported on 1/27/2020)     No current facility-administered medications on file prior to visit  She is allergic to codeine; naproxen; pentobarbital; sodium perborate; and sulfa antibiotics       Review of Systems   All other systems reviewed and are negative  Objective:      /64   Pulse 73   Ht 5' (1 524 m)   Wt 101 kg (223 lb)   SpO2 96%   BMI 43 55 kg/m²          Physical Exam   Constitutional: She is oriented to person, place, and time  She appears well-developed and well-nourished  Neck: Normal range of motion  Neck supple  Cardiovascular: Normal rate, regular rhythm, normal heart sounds and intact distal pulses  Pulmonary/Chest: Effort normal and breath sounds normal    Abdominal: Soft  Bowel sounds are normal    Musculoskeletal: Normal range of motion  Neurological: She is alert and oriented to person, place, and time  Skin: Skin is warm and dry  Capillary refill takes less than 2 seconds  Psychiatric: She has a normal mood and affect  Her behavior is normal  Judgment and thought content normal    Nursing note and vitals reviewed

## 2020-02-06 DIAGNOSIS — E11.9 TYPE 2 DIABETES MELLITUS WITHOUT COMPLICATION, WITHOUT LONG-TERM CURRENT USE OF INSULIN (HCC): ICD-10-CM

## 2020-02-06 RX ORDER — SPIRONOLACTONE 25 MG/1
TABLET ORAL
Qty: 90 TABLET | Refills: 0 | Status: SHIPPED | OUTPATIENT
Start: 2020-02-06 | End: 2020-02-10

## 2020-02-10 DIAGNOSIS — E11.9 TYPE 2 DIABETES MELLITUS WITHOUT COMPLICATION, WITHOUT LONG-TERM CURRENT USE OF INSULIN (HCC): ICD-10-CM

## 2020-02-10 RX ORDER — SPIRONOLACTONE 25 MG/1
TABLET ORAL
Qty: 90 TABLET | Refills: 1 | Status: SHIPPED | OUTPATIENT
Start: 2020-02-10 | End: 2020-08-05 | Stop reason: SDUPTHER

## 2020-03-19 DIAGNOSIS — R52 PAIN: ICD-10-CM

## 2020-03-20 RX ORDER — MELOXICAM 15 MG/1
15 TABLET ORAL DAILY
Qty: 90 TABLET | Refills: 3 | Status: SHIPPED | OUTPATIENT
Start: 2020-03-20 | End: 2021-02-02

## 2020-03-23 DIAGNOSIS — E11.65 TYPE 2 DIABETES MELLITUS WITH HYPERGLYCEMIA, WITHOUT LONG-TERM CURRENT USE OF INSULIN (HCC): ICD-10-CM

## 2020-03-23 RX ORDER — GLIMEPIRIDE 4 MG/1
TABLET ORAL
Qty: 180 TABLET | Refills: 0 | Status: SHIPPED | OUTPATIENT
Start: 2020-03-23 | End: 2020-05-04

## 2020-05-02 DIAGNOSIS — E11.65 TYPE 2 DIABETES MELLITUS WITH HYPERGLYCEMIA, WITHOUT LONG-TERM CURRENT USE OF INSULIN (HCC): ICD-10-CM

## 2020-05-04 RX ORDER — GLIMEPIRIDE 4 MG/1
TABLET ORAL
Qty: 180 TABLET | Refills: 0 | Status: SHIPPED | OUTPATIENT
Start: 2020-05-04 | End: 2020-09-17

## 2020-08-05 ENCOUNTER — OFFICE VISIT (OUTPATIENT)
Dept: FAMILY MEDICINE CLINIC | Facility: CLINIC | Age: 60
End: 2020-08-05
Payer: COMMERCIAL

## 2020-08-05 VITALS
DIASTOLIC BLOOD PRESSURE: 64 MMHG | HEART RATE: 75 BPM | OXYGEN SATURATION: 96 % | BODY MASS INDEX: 44.17 KG/M2 | TEMPERATURE: 98.9 F | HEIGHT: 60 IN | SYSTOLIC BLOOD PRESSURE: 116 MMHG | WEIGHT: 225 LBS

## 2020-08-05 DIAGNOSIS — E66.01 MORBID OBESITY WITH BMI OF 40.0-44.9, ADULT (HCC): ICD-10-CM

## 2020-08-05 DIAGNOSIS — E11.9 TYPE 2 DIABETES MELLITUS WITHOUT COMPLICATION, WITHOUT LONG-TERM CURRENT USE OF INSULIN (HCC): Primary | ICD-10-CM

## 2020-08-05 DIAGNOSIS — E78.2 MIXED HYPERLIPIDEMIA: ICD-10-CM

## 2020-08-05 LAB — SL AMB POCT HEMOGLOBIN AIC: 6.9 (ref ?–6.5)

## 2020-08-05 PROCEDURE — 1036F TOBACCO NON-USER: CPT | Performed by: FAMILY MEDICINE

## 2020-08-05 PROCEDURE — 3008F BODY MASS INDEX DOCD: CPT | Performed by: FAMILY MEDICINE

## 2020-08-05 PROCEDURE — 3044F HG A1C LEVEL LT 7.0%: CPT | Performed by: FAMILY MEDICINE

## 2020-08-05 PROCEDURE — 99214 OFFICE O/P EST MOD 30 MIN: CPT | Performed by: FAMILY MEDICINE

## 2020-08-05 PROCEDURE — 3074F SYST BP LT 130 MM HG: CPT | Performed by: FAMILY MEDICINE

## 2020-08-05 PROCEDURE — 3078F DIAST BP <80 MM HG: CPT | Performed by: FAMILY MEDICINE

## 2020-08-05 PROCEDURE — 83036 HEMOGLOBIN GLYCOSYLATED A1C: CPT | Performed by: FAMILY MEDICINE

## 2020-08-05 RX ORDER — SPIRONOLACTONE 25 MG/1
25 TABLET ORAL DAILY
Qty: 90 TABLET | Refills: 1 | Status: SHIPPED | OUTPATIENT
Start: 2020-08-05 | End: 2021-02-02

## 2020-08-05 NOTE — PROGRESS NOTES
Assessment/Plan:    No problem-specific Assessment & Plan notes found for this encounter  Diagnoses and all orders for this visit:    Type 2 diabetes mellitus without complication, without long-term current use of insulin (HCC)  -     POCT hemoglobin A1c  -     spironolactone (ALDACTONE) 25 mg tablet; Take 1 tablet (25 mg total) by mouth daily  -     metFORMIN (GLUCOPHAGE) 1000 MG tablet; Take 1 tablet (1,000 mg total) by mouth every 12 (twelve) hours  -     Lipid panel; Future  -     Comprehensive metabolic panel; Future  -     TSH, 3rd generation; Future  -     Microalbumin / creatinine urine ratio  -     XR wrist 3+ vw left; Future  -     XR foot 3+ vw left; Future  -     XR ankle 3+ vw left; Future    Mixed hyperlipidemia  -     Lipid panel; Future  -     Comprehensive metabolic panel; Future  -     TSH, 3rd generation; Future  -     Microalbumin / creatinine urine ratio  -     XR wrist 3+ vw left; Future  -     XR foot 3+ vw left; Future  -     XR ankle 3+ vw left; Future    Morbid obesity with BMI of 40 0-44 9, adult ([facility] )  -     Lipid panel; Future  -     Comprehensive metabolic panel; Future  -     TSH, 3rd generation; Future  -     Microalbumin / creatinine urine ratio  -     XR wrist 3+ vw left; Future  -     XR foot 3+ vw left; Future  -     XR ankle 3+ vw left; Future          Subjective:      Patient ID: Yahaira Herbert is a 56 y o  female  Her hemoglobin A1c is at goal at 6 9 percent in the office today  She has no side effects from her current regimen and has no hypoglycemic events  Her blood pressure is at goal today  She is on an ACE-inhibitor without cough  She has no chest pain or shortness of breath  She has no headache or vision changes  She has no PND, orthopnea, or dyspnea on exertion  She is on high-intensity statin  Her LDL is mildly elevated but she is due for recheck  Her liver function testing is within normal limits  She has no myalgia or muscle weakness      She has pain in the medial aspect of her left wrist   She states that the pain started when she was using some high tension knitting techniques  The pain got a little better with a different technique but has since returned  She feels like someone has smashed her wrist and she has increased pain with certain movements of the wrist and arm  It is not swollen, red, or warm  She has no numbness or weakness  She has no history of gout  Her right wrist is unaffected  She has pain, tenderness, and swelling along the medial forefoot on the left side  She also has pain under the base of the great toe and in to the arch of the left foot  She states this bothers her most when she starts to walk after having been sitting  It will eventually ease up but will reoccur when she rests  Yesterday she had pain throughout the course of the day without any easing of her symptoms  The following portions of the patient's history were reviewed and updated as appropriate:   She  has a past medical history of Arthritis, Bursitis of both feet, Carpal tunnel syndrome, bilateral, Diabetes mellitus ([facility] ), and Hypertension  She   Patient Active Problem List    Diagnosis Date Noted    Decreased GFR 09/20/2019    Morbid obesity with BMI of 40 0-44 9, adult ([facility] ) 09/20/2018    Rash 09/20/2018    Hyperlipidemia 12/07/2017    Seasonal allergies 12/07/2017    DM type 2 (diabetes mellitus, type 2) ([facility] ) 09/15/2017    Hypertension, essential 09/15/2017    GERD without esophagitis 09/15/2017     She  has a past surgical history that includes [place] tooth extraction  Her family history includes Cancer in her brother, father, and mother; Diabetes in her family, mother, and sister; Heart disease in her father and mother  She  reports that she has never smoked  She has never used smokeless tobacco  She reports that she does not drink alcohol or use drugs    Current Outpatient Medications   Medication Sig Dispense Refill    atorvastatin (LIPITOR) 40 mg tablet Take 1 tablet (40 mg total) by mouth daily 90 tablet 3    glimepiride (AMARYL) 4 mg tablet Take 1 tablet by mouth twice daily 180 tablet 0    lisinopril (ZESTRIL) 10 mg tablet Take 1 tablet (10 mg total) by mouth daily 90 tablet 3    meloxicam (MOBIC) 15 mg tablet Take 1 tablet (15 mg total) by mouth daily 90 tablet 3    metFORMIN (GLUCOPHAGE) 1000 MG tablet Take 1 tablet (1,000 mg total) by mouth every 12 (twelve) hours 180 tablet 1    metoprolol tartrate (LOPRESSOR) 100 mg tablet Take by mouth      ranitidine (ZANTAC) 300 MG capsule Take 1 capsule by mouth 2 (two) times a day OTC      spironolactone (ALDACTONE) 25 mg tablet Take 1 tablet (25 mg total) by mouth daily 90 tablet 1    triamcinolone (KENALOG) 0 1 % cream Apply topically 2 (two) times a day (Patient not taking: Reported on 1/27/2020) 30 g 0     No current facility-administered medications for this visit  Current Outpatient Medications on File Prior to Visit   Medication Sig    atorvastatin (LIPITOR) 40 mg tablet Take 1 tablet (40 mg total) by mouth daily    lisinopril (ZESTRIL) 10 mg tablet Take 1 tablet (10 mg total) by mouth daily    meloxicam (MOBIC) 15 mg tablet Take 1 tablet (15 mg total) by mouth daily    metoprolol tartrate (LOPRESSOR) 100 mg tablet Take by mouth    ranitidine (ZANTAC) 300 MG capsule Take 1 capsule by mouth 2 (two) times a day OTC    triamcinolone (KENALOG) 0 1 % cream Apply topically 2 (two) times a day (Patient not taking: Reported on 1/27/2020)    [DISCONTINUED] glimepiride (AMARYL) 4 mg tablet Take 1 tablet by mouth twice daily     No current facility-administered medications on file prior to visit  She is allergic to codeine; naproxen; pentobarbital; sodium perborate; and sulfa antibiotics       Review of Systems   All other systems reviewed and are negative          Objective:      /64   Pulse 75   Temp 98 9 °F (37 2 °C)   Ht 5' (1 524 m)   Wt 102 kg (225 lb)   SpO2 96%   BMI 43 94 kg/m²          Physical Exam  Vitals signs and nursing note reviewed  Constitutional:       Appearance: Normal appearance  She is obese  HENT:      Head: Normocephalic and atraumatic  Neck:      Musculoskeletal: Normal range of motion and neck supple  Cardiovascular:      Pulses: Normal pulses  Heart sounds: Normal heart sounds  Pulmonary:      Effort: Pulmonary effort is normal       Breath sounds: Normal breath sounds  Skin:     General: Skin is warm and dry  Neurological:      General: No focal deficit present  Mental Status: She is alert  She is disoriented     Psychiatric:         Mood and Affect: Mood normal          Behavior: Behavior normal          Judgment: Judgment normal

## 2020-09-16 DIAGNOSIS — E11.65 TYPE 2 DIABETES MELLITUS WITH HYPERGLYCEMIA, WITHOUT LONG-TERM CURRENT USE OF INSULIN (HCC): ICD-10-CM

## 2020-09-16 LAB
LEFT EYE DIABETIC RETINOPATHY: NORMAL
RIGHT EYE DIABETIC RETINOPATHY: NORMAL

## 2020-09-17 RX ORDER — GLIMEPIRIDE 4 MG/1
TABLET ORAL
Qty: 180 TABLET | Refills: 0 | Status: SHIPPED | OUTPATIENT
Start: 2020-09-17 | End: 2020-12-28 | Stop reason: SDUPTHER

## 2020-09-17 NOTE — PROGRESS NOTES
BMI Counseling: Body mass index is 43 94 kg/m²  The BMI is above normal  Nutrition recommendations include reducing portion sizes, decreasing overall calorie intake, 3-5 servings of fruits/vegetables daily, reducing fast food intake, consuming healthier snacks, decreasing soda and/or juice intake and moderation in carbohydrate intake  Exercise recommendations include moderate aerobic physical activity for 150 minutes/week, vigorous aerobic physical activity for 75 minutes/week, exercising 3-5 times per week, joining a gym and strength training exercises

## 2020-09-17 NOTE — PROGRESS NOTES
Diabetic Foot Exam    Patient's shoes and socks removed  Right Foot/Ankle   Right Foot Inspection  Skin Exam: skin normal and skin intact no dry skin, no warmth, no callus, no erythema, no maceration, no abnormal color, no pre-ulcer, no ulcer and no callus                        Amputation: amputation right foot   Toe Exam: ROM and strength within normal limits  Sensory   Vibration: intact  Proprioception: intact   Monofilament testing: intact  Vascular  Capillary refills: < 3 seconds  The right DP pulse is 1+  The right PT pulse is 1+  Left Foot/Ankle  Left Foot Inspection  Skin Exam: skin normal and skin intactno dry skin, no warmth, no erythema, no maceration, normal color, no pre-ulcer, no ulcer and no callus                       Amputation: amputation left foot   Toe Exam: ROM and strength within normal limits                   Sensory   Vibration: intact  Proprioception: intact  Monofilament: intact  Vascular  Capillary refills: < 3 seconds  The left DP pulse is 1+  The left PT pulse is 1+  Assign Risk Category:  No deformity present; No loss of protective sensation;  No weak pulses       Risk: 0

## 2020-12-28 DIAGNOSIS — E11.65 TYPE 2 DIABETES MELLITUS WITH HYPERGLYCEMIA, WITHOUT LONG-TERM CURRENT USE OF INSULIN (HCC): ICD-10-CM

## 2020-12-28 RX ORDER — GLIMEPIRIDE 4 MG/1
4 TABLET ORAL 2 TIMES DAILY
Qty: 180 TABLET | Refills: 3 | Status: SHIPPED | OUTPATIENT
Start: 2020-12-28 | End: 2021-11-16

## 2020-12-30 ENCOUNTER — VBI (OUTPATIENT)
Dept: ADMINISTRATIVE | Facility: OTHER | Age: 60
End: 2020-12-30

## 2021-01-26 ENCOUNTER — TELEPHONE (OUTPATIENT)
Dept: FAMILY MEDICINE CLINIC | Facility: CLINIC | Age: 61
End: 2021-01-26

## 2021-01-29 DIAGNOSIS — I10 HYPERTENSION, ESSENTIAL: ICD-10-CM

## 2021-01-29 DIAGNOSIS — R52 PAIN: ICD-10-CM

## 2021-01-29 DIAGNOSIS — Z79.4 TYPE 2 DIABETES MELLITUS WITH OTHER SPECIFIED COMPLICATION, WITH LONG-TERM CURRENT USE OF INSULIN (HCC): ICD-10-CM

## 2021-01-29 DIAGNOSIS — E11.9 TYPE 2 DIABETES MELLITUS WITHOUT COMPLICATION, WITHOUT LONG-TERM CURRENT USE OF INSULIN (HCC): ICD-10-CM

## 2021-01-29 DIAGNOSIS — E11.69 TYPE 2 DIABETES MELLITUS WITH OTHER SPECIFIED COMPLICATION, WITH LONG-TERM CURRENT USE OF INSULIN (HCC): ICD-10-CM

## 2021-02-02 RX ORDER — MELOXICAM 15 MG/1
TABLET ORAL
Qty: 90 TABLET | Refills: 0 | Status: SHIPPED | OUTPATIENT
Start: 2021-02-02 | End: 2021-04-26

## 2021-02-02 RX ORDER — LISINOPRIL 10 MG/1
TABLET ORAL
Qty: 90 TABLET | Refills: 0 | Status: SHIPPED | OUTPATIENT
Start: 2021-02-02 | End: 2021-04-26

## 2021-02-02 RX ORDER — SPIRONOLACTONE 25 MG/1
TABLET ORAL
Qty: 90 TABLET | Refills: 0 | Status: SHIPPED | OUTPATIENT
Start: 2021-02-02 | End: 2021-03-25

## 2021-02-16 ENCOUNTER — TELEPHONE (OUTPATIENT)
Dept: FAMILY MEDICINE CLINIC | Facility: CLINIC | Age: 61
End: 2021-02-16

## 2021-02-16 DIAGNOSIS — Z12.31 ENCOUNTER FOR SCREENING MAMMOGRAM FOR BREAST CANCER: Primary | ICD-10-CM

## 2021-03-03 DIAGNOSIS — E11.9 TYPE 2 DIABETES MELLITUS WITHOUT COMPLICATION, WITHOUT LONG-TERM CURRENT USE OF INSULIN (HCC): Primary | ICD-10-CM

## 2021-03-12 ENCOUNTER — VBI (OUTPATIENT)
Dept: ADMINISTRATIVE | Facility: OTHER | Age: 61
End: 2021-03-12

## 2021-03-12 LAB
EST. AVERAGE GLUCOSE BLD GHB EST-MCNC: 143 (CALC)
EST. AVERAGE GLUCOSE BLD GHB EST-SCNC: 7.9 (CALC)
HBA1C MFR BLD: 6.6 % OF TOTAL HGB

## 2021-03-19 ENCOUNTER — RA CDI HCC (OUTPATIENT)
Dept: OTHER | Facility: HOSPITAL | Age: 61
End: 2021-03-19

## 2021-03-19 NOTE — PROGRESS NOTES
[facility]  coding oppertunities          Chart reviewed, no opportunity found: CHART REVIEWED, NO OPPORTUNITY FOUND

## 2021-03-23 ENCOUNTER — TELEPHONE (OUTPATIENT)
Dept: FAMILY MEDICINE CLINIC | Facility: CLINIC | Age: 61
End: 2021-03-23

## 2021-03-25 ENCOUNTER — OFFICE VISIT (OUTPATIENT)
Dept: FAMILY MEDICINE CLINIC | Facility: CLINIC | Age: 61
End: 2021-03-25
Payer: COMMERCIAL

## 2021-03-25 VITALS
SYSTOLIC BLOOD PRESSURE: 116 MMHG | WEIGHT: 222 LBS | OXYGEN SATURATION: 97 % | HEIGHT: 61 IN | DIASTOLIC BLOOD PRESSURE: 68 MMHG | BODY MASS INDEX: 41.91 KG/M2 | TEMPERATURE: 97.3 F | HEART RATE: 77 BPM

## 2021-03-25 DIAGNOSIS — E11.9 TYPE 2 DIABETES MELLITUS WITHOUT COMPLICATION, WITHOUT LONG-TERM CURRENT USE OF INSULIN (HCC): Primary | ICD-10-CM

## 2021-03-25 DIAGNOSIS — E78.2 MIXED HYPERLIPIDEMIA: ICD-10-CM

## 2021-03-25 DIAGNOSIS — I10 HYPERTENSION, ESSENTIAL: ICD-10-CM

## 2021-03-25 DIAGNOSIS — R21 RASH: ICD-10-CM

## 2021-03-25 DIAGNOSIS — E66.01 MORBID OBESITY WITH BMI OF 40.0-44.9, ADULT (HCC): ICD-10-CM

## 2021-03-25 PROCEDURE — 99214 OFFICE O/P EST MOD 30 MIN: CPT | Performed by: FAMILY MEDICINE

## 2021-03-25 RX ORDER — TRIAMCINOLONE ACETONIDE 1 MG/G
CREAM TOPICAL 2 TIMES DAILY
Qty: 45 G | Refills: 5 | Status: SHIPPED | OUTPATIENT
Start: 2021-03-25 | End: 2022-03-28

## 2021-03-25 NOTE — PROGRESS NOTES
Assessment/Plan:    No problem-specific Assessment & Plan notes found for this encounter  Diagnoses and all orders for this visit:    Type 2 diabetes mellitus without complication, without long-term current use of insulin ([facility])    Hypertension, essential    Rash  -     triamcinolone (KENALOG) 0 1 % cream; Apply topically 2 (two) times a day    Mixed hyperlipidemia    Morbid obesity with BMI of 40 0-44 9, adult ([facility])          Subjective:      Patient ID: Artur Montaño is a 56 y o  female  Her hemoglobin A1c is 6 6 percent  She is having no side effects from her current regimen  She has had no recent hypoglycemic events  Her blood pressure is under excellent control in the office today  She has no cough  She has no headache or vision changes  She has no PND, orthopnea, or dyspnea on exertion  She is on high-intensity statin therapy  Unfortunately, I do not have a recent lipid panel  She has no myalgia or muscle weakness and her liver function testing is within normal limits  She had both COVID-19 vaccines  The following portions of the patient's history were reviewed and updated as appropriate:   She  has a past medical history of Arthritis, Bursitis of both feet, Carpal tunnel syndrome, bilateral, Diabetes mellitus ([facility] ), and Hypertension  She   Patient Active Problem List    Diagnosis Date Noted    Decreased GFR 09/20/2019    Morbid obesity with BMI of 40 0-44 9, adult ([facility] ) 09/20/2018    Rash 09/20/2018    Hyperlipidemia 12/07/2017    Seasonal allergies 12/07/2017    DM type 2 (diabetes mellitus, type 2) ([facility] ) 09/15/2017    Hypertension, essential 09/15/2017    GERD without esophagitis 09/15/2017     She  has a past surgical history that includes [place] tooth extraction  Her family history includes Cancer in her brother, father, and mother; Diabetes in her family, mother, and sister; Heart disease in her father and mother  She  reports that she has never smoked   She has never used smokeless tobacco  She reports that she does not drink alcohol or use drugs  Current Outpatient Medications   Medication Sig Dispense Refill    atorvastatin (LIPITOR) 40 mg tablet Take 1 tablet (40 mg total) by mouth daily 90 tablet 3    glimepiride (AMARYL) 4 mg tablet Take 1 tablet (4 mg total) by mouth 2 (two) times a day 180 tablet 3    lisinopril (ZESTRIL) 10 mg tablet Take 1 tablet by mouth once daily 90 tablet 0    meloxicam (MOBIC) 15 mg tablet Take 1 tablet by mouth once daily 90 tablet 0    metFORMIN (GLUCOPHAGE) 1000 MG tablet TAKE 1 TABLET BY MOUTH EVERY 12 HOURS 180 tablet 0    metoprolol tartrate (LOPRESSOR) 100 mg tablet Take by mouth      ranitidine (ZANTAC) 300 MG capsule Take 1 capsule by mouth 2 (two) times a day OTC      triamcinolone (KENALOG) 0 1 % cream Apply topically 2 (two) times a day 45 g 5     No current facility-administered medications for this visit  Current Outpatient Medications on File Prior to Visit   Medication Sig    atorvastatin (LIPITOR) 40 mg tablet Take 1 tablet (40 mg total) by mouth daily    glimepiride (AMARYL) 4 mg tablet Take 1 tablet (4 mg total) by mouth 2 (two) times a day    lisinopril (ZESTRIL) 10 mg tablet Take 1 tablet by mouth once daily    meloxicam (MOBIC) 15 mg tablet Take 1 tablet by mouth once daily    metFORMIN (GLUCOPHAGE) 1000 MG tablet TAKE 1 TABLET BY MOUTH EVERY 12 HOURS    metoprolol tartrate (LOPRESSOR) 100 mg tablet Take by mouth    ranitidine (ZANTAC) 300 MG capsule Take 1 capsule by mouth 2 (two) times a day OTC    [DISCONTINUED] spironolactone (ALDACTONE) 25 mg tablet Take 1 tablet by mouth daily (Patient not taking: Reported on 3/25/2021)    [DISCONTINUED] triamcinolone (KENALOG) 0 1 % cream Apply topically 2 (two) times a day (Patient not taking: Reported on 1/27/2020)     No current facility-administered medications on file prior to visit        She is allergic to codeine; naproxen; pentobarbital; sodium perborate; and sulfa antibiotics       Review of Systems   All other systems reviewed and are negative  Objective:      /68   Pulse 77   Temp (!) 97 3 °F (36 3 °C)   Ht 5' 1" (1 549 m)   Wt 101 kg (222 lb)   SpO2 97%   BMI 41 95 kg/m²          Physical Exam  Vitals signs and nursing note reviewed  Constitutional:       Appearance: Normal appearance  She is obese  Neck:      Musculoskeletal: Normal range of motion and neck supple  Cardiovascular:      Rate and Rhythm: Normal rate and regular rhythm  Pulses: Normal pulses  Heart sounds: Normal heart sounds  Pulmonary:      Effort: Pulmonary effort is normal       Breath sounds: Normal breath sounds  Abdominal:      General: Abdomen is flat  Bowel sounds are normal       Palpations: Abdomen is soft  Musculoskeletal: Normal range of motion  Skin:     General: Skin is warm and dry  Capillary Refill: Capillary refill takes less than 2 seconds  Neurological:      General: No focal deficit present  Mental Status: She is alert and oriented to person, place, and time  Mental status is at baseline  Psychiatric:         Mood and Affect: Mood normal          Behavior: Behavior normal          Thought Content:  Thought content normal          Judgment: Judgment normal

## 2021-04-26 DIAGNOSIS — Z79.4 TYPE 2 DIABETES MELLITUS WITH OTHER SPECIFIED COMPLICATION, WITH LONG-TERM CURRENT USE OF INSULIN (HCC): ICD-10-CM

## 2021-04-26 DIAGNOSIS — E11.69 TYPE 2 DIABETES MELLITUS WITH OTHER SPECIFIED COMPLICATION, WITH LONG-TERM CURRENT USE OF INSULIN (HCC): ICD-10-CM

## 2021-04-26 DIAGNOSIS — E11.9 TYPE 2 DIABETES MELLITUS WITHOUT COMPLICATION, WITHOUT LONG-TERM CURRENT USE OF INSULIN (HCC): ICD-10-CM

## 2021-04-26 DIAGNOSIS — R52 PAIN: ICD-10-CM

## 2021-04-26 DIAGNOSIS — I10 HYPERTENSION, ESSENTIAL: ICD-10-CM

## 2021-04-26 RX ORDER — MELOXICAM 15 MG/1
TABLET ORAL
Qty: 90 TABLET | Refills: 0 | Status: SHIPPED | OUTPATIENT
Start: 2021-04-26 | End: 2021-09-13

## 2021-04-26 RX ORDER — LISINOPRIL 10 MG/1
TABLET ORAL
Qty: 90 TABLET | Refills: 0 | Status: SHIPPED | OUTPATIENT
Start: 2021-04-26 | End: 2021-08-13

## 2021-05-14 ENCOUNTER — VBI (OUTPATIENT)
Dept: ADMINISTRATIVE | Facility: OTHER | Age: 61
End: 2021-05-14

## 2021-07-02 NOTE — ASSESSMENT & PLAN NOTE
Stable  Continue current  Simple: Patient demonstrates quick and easy understanding/Verbalized Understanding

## 2021-07-30 DIAGNOSIS — E11.9 TYPE 2 DIABETES MELLITUS WITHOUT COMPLICATION, WITHOUT LONG-TERM CURRENT USE OF INSULIN (HCC): ICD-10-CM

## 2021-08-13 DIAGNOSIS — Z79.4 TYPE 2 DIABETES MELLITUS WITH OTHER SPECIFIED COMPLICATION, WITH LONG-TERM CURRENT USE OF INSULIN (HCC): ICD-10-CM

## 2021-08-13 DIAGNOSIS — I10 HYPERTENSION, ESSENTIAL: ICD-10-CM

## 2021-08-13 DIAGNOSIS — E11.69 TYPE 2 DIABETES MELLITUS WITH OTHER SPECIFIED COMPLICATION, WITH LONG-TERM CURRENT USE OF INSULIN (HCC): ICD-10-CM

## 2021-08-13 RX ORDER — LISINOPRIL 10 MG/1
TABLET ORAL
Qty: 90 TABLET | Refills: 0 | Status: SHIPPED | OUTPATIENT
Start: 2021-08-13 | End: 2021-08-16

## 2021-08-18 ENCOUNTER — VBI (OUTPATIENT)
Dept: ADMINISTRATIVE | Facility: OTHER | Age: 61
End: 2021-08-18

## 2021-09-13 DIAGNOSIS — R52 PAIN: ICD-10-CM

## 2021-09-13 RX ORDER — MELOXICAM 15 MG/1
TABLET ORAL
Qty: 90 TABLET | Refills: 0 | Status: SHIPPED | OUTPATIENT
Start: 2021-09-13 | End: 2021-09-20

## 2021-09-18 DIAGNOSIS — R52 PAIN: ICD-10-CM

## 2021-09-20 RX ORDER — MELOXICAM 15 MG/1
TABLET ORAL
Qty: 90 TABLET | Refills: 0 | Status: SHIPPED | OUTPATIENT
Start: 2021-09-20 | End: 2022-03-10 | Stop reason: SDUPTHER

## 2021-10-06 ENCOUNTER — VBI (OUTPATIENT)
Dept: ADMINISTRATIVE | Facility: OTHER | Age: 61
End: 2021-10-06

## 2021-11-15 DIAGNOSIS — E11.65 TYPE 2 DIABETES MELLITUS WITH HYPERGLYCEMIA, WITHOUT LONG-TERM CURRENT USE OF INSULIN (HCC): ICD-10-CM

## 2021-11-16 RX ORDER — GLIMEPIRIDE 4 MG/1
TABLET ORAL
Qty: 180 TABLET | Refills: 0 | Status: SHIPPED | OUTPATIENT
Start: 2021-11-16 | End: 2022-03-10 | Stop reason: SDUPTHER

## 2021-12-14 ENCOUNTER — VBI (OUTPATIENT)
Dept: ADMINISTRATIVE | Facility: OTHER | Age: 61
End: 2021-12-14

## 2022-02-22 ENCOUNTER — APPOINTMENT (OUTPATIENT)
Dept: RADIOLOGY | Facility: CLINIC | Age: 62
End: 2022-02-22
Payer: COMMERCIAL

## 2022-02-22 ENCOUNTER — OFFICE VISIT (OUTPATIENT)
Dept: URGENT CARE | Facility: CLINIC | Age: 62
End: 2022-02-22
Payer: COMMERCIAL

## 2022-02-22 DIAGNOSIS — H11.31 SUBCONJUNCTIVAL HEMORRHAGE OF RIGHT EYE: ICD-10-CM

## 2022-02-22 DIAGNOSIS — M23.52 RECURRENT LEFT KNEE INSTABILITY: Primary | ICD-10-CM

## 2022-02-22 DIAGNOSIS — R09.81 NASAL CONGESTION: ICD-10-CM

## 2022-02-22 DIAGNOSIS — M23.52 RECURRENT LEFT KNEE INSTABILITY: ICD-10-CM

## 2022-02-22 PROCEDURE — 73562 X-RAY EXAM OF KNEE 3: CPT

## 2022-02-22 PROCEDURE — 99203 OFFICE O/P NEW LOW 30 MIN: CPT | Performed by: PHYSICIAN ASSISTANT

## 2022-02-22 RX ORDER — FLUTICASONE PROPIONATE 50 MCG
1 SPRAY, SUSPENSION (ML) NASAL DAILY
Qty: 11.1 ML | Refills: 0 | Status: SHIPPED | OUTPATIENT
Start: 2022-02-22

## 2022-02-22 NOTE — PATIENT INSTRUCTIONS
Allergic Rhinitis   WHAT YOU NEED TO KNOW:   Allergic rhinitis, or hay fever, is swelling of the inside of your nose  The swelling is a reaction to allergens in the air  An allergen can be anything that causes an allergic reaction  Allergies to weeds, grass, trees, or mold often cause seasonal allergic rhinitis  Indoor dust mites, cockroaches, pet dander, or mold can also cause allergic rhinitis  DISCHARGE INSTRUCTIONS:   Call 911 for the following:   · You have chest pain or shortness of breath  Return to the emergency department if:   · You have severe pain  · You cough up blood  Contact your healthcare provider if:   · You have a fever  · You have ear or sinus pain, or a headache  · Your symptoms get worse, even after treatment  · You have yellow, green, brown, or bloody mucus coming from your nose  · Your nose is bleeding or you have pain inside your nose  · You have trouble sleeping because of your symptoms  · You have questions or concerns about your condition or care  Medicines:   · Medicines  help decrease your symptoms and clear your stuffy nose  · Take your medicine as directed  Contact your healthcare provider if you think your medicine is not helping or if you have side effects  Tell him of her if you are allergic to any medicine  Keep a list of the medicines, vitamins, and herbs you take  Include the amounts, and when and why you take them  Bring the list or the pill bottles to follow-up visits  Carry your medicine list with you in case of an emergency  How to manage allergic rhinitis:  The best way to manage allergic rhinitis is to avoid allergens that can trigger your symptoms  Any of the following may help decrease your symptoms:  · Rinse your nose and sinuses  with a salt water solution or use a salt water nasal spray  This will help thin the mucus in your nose and rinse away pollen and dirt  It will also help reduce swelling so you can breathe normally  Ask your healthcare provider how often to rinse your nose  · Reduce exposure to dust mites  Wash sheets and towels in hot water every week  Cover your pillows and mattresses with allergen-free covers  Limit the number of stuffed animals and soft toys your child has  Wash your child's toys in hot water regularly  Vacuum weekly and use a vacuum  with an air filter  If possible, get rid of carpets and curtains  These collect dust and dust mites  · Reduce exposure to pollen  Keep windows and doors closed in your house and car  Stay inside when air pollution or the pollen count is high  Run your air conditioner on recycle, and change air filters often  Shower and wash your hair before bed every night to rinse away pollen  · Reduce exposure to pet dander  If possible, do not keep cats, dogs, birds, or other pets  If you do keep pets in your home, keep them out of bedrooms and carpeted rooms  Bathe them often  · Reduce exposure to mold  Do not spend time in basements  Choose artificial plants instead of live plants  Keep your home's humidity at less than 45%  Do not have ponds or standing water in your home or yard  · Do not smoke  Avoid others who smoke  Ask your healthcare provider for information if you currently smoke and need help to quit  Follow up with your healthcare provider as directed: You may need to see an allergist often to control your symptoms  Write down your questions so you remember to ask them during your visits  © [employer] 2021 Information is for End User's use only and may not be sold, redistributed or otherwise used for commercial purposes  All illustrations and images included in CareNotes® are the copyrighted property of A D A M , Inc  or [facility] [employer]   The above information is an  only  It is not intended as medical advice for individual conditions or treatments   Talk to your doctor, nurse or pharmacist before following any medical regimen to see if it is safe and effective for you  Knee Pain   WHAT YOU NEED TO KNOW:   Knee pain may start suddenly, or it may be a long-term problem  You may have pain on the side, front, or back of your knee  You may have knee stiffness and swelling  You may hear popping sounds or feel like your knee is giving way or locking up as you walk  You may feel pain when you sit, stand, walk, or climb up and down stairs  Knee pain can be caused by conditions such as obesity, inflammation, or strains or tears in ligaments or tendons  DISCHARGE INSTRUCTIONS:   Return to the emergency department if:   · Your pain is worse, even after treatment  · You cannot bend or straighten your leg completely  · The swelling around your knee does not go down even with treatment  · Your knee is painful and hot to the touch  Contact your healthcare provider if:   · You have questions or concerns about your condition or care  Medicines: You may need any of the following:  · NSAIDs  help decrease swelling and pain or fever  This medicine is available with or without a doctor's order  NSAIDs can cause stomach bleeding or kidney problems in certain people  If you take blood thinner medicine, always ask your healthcare provider if NSAIDs are safe for you  Always read the medicine label and follow directions  · Acetaminophen  decreases pain and fever  It is available without a doctor's order  Ask how much to take and how often to take it  Follow directions  Read the labels of all other medicines you are using to see if they also contain acetaminophen, or ask your doctor or pharmacist  Acetaminophen can cause liver damage if not taken correctly  Do not use more than 4 grams (4,000 milligrams) total of acetaminophen in one day  · Prescription pain medicine  may be given  Ask your healthcare provider how to take this medicine safely  Some prescription pain medicines contain acetaminophen   Do not take other medicines that contain acetaminophen without talking to your healthcare provider  Too much acetaminophen may cause liver damage  Prescription pain medicine may cause constipation  Ask your healthcare provider how to prevent or treat constipation  · Take your medicine as directed  Contact your healthcare provider if you think your medicine is not helping or if you have side effects  Tell him or her if you are allergic to any medicine  Keep a list of the medicines, vitamins, and herbs you take  Include the amounts, and when and why you take them  Bring the list or the pill bottles to follow-up visits  Carry your medicine list with you in case of an emergency  What you can do to manage your symptoms:   · Rest your knee so it can heal   Limit activities that increase your pain  Do low-impact exercises, such as walking or swimming  · Apply ice to help reduce swelling and pain  Use an ice pack, or put crushed ice in a plastic bag  Cover it with a towel before you apply it to your knee  Apply ice for 15 to 20 minutes every hour, or as directed  · Apply compression to help reduce swelling  Use a brace or bandage only as directed  · Elevate your knee to help decrease pain and swelling  Elevate your knee while you are sitting or lying down  Prop your leg on pillows to keep your knee above the level of your heart  · Prevent your knee from moving as directed  Your healthcare provider may put on a cast or splint  You may need to wear a leg brace to stabilize your knee  A leg brace can be adjusted to increase your range of motion as your knee heals  What you can do to prevent knee pain:   · Maintain a healthy weight  Extra weight increases your risk for knee pain  Ask your healthcare provider how much you should weigh  He or she can help you create a safe weight loss plan if you need to lose weight  · Exercise or train properly  Use the correct equipment for sports   Wear shoes that provide good support  Check your posture often as you exercise, play sports, or train for an event  This can help prevent stress and strain on your knees  Rest between sessions so you do not overwork your knees  Follow up with your healthcare provider within 24 hours or as directed: You may need follow-up treatments, such as steroid injections to decrease pain  Write down your questions so you remember to ask them during your visits  © Copyright [employer] 2021 Information is for End User's use only and may not be sold, redistributed or otherwise used for commercial purposes  All illustrations and images included in CareNotes® are the copyrighted property of A D A M , Inc  or [facility] [employer]   The above information is an  only  It is not intended as medical advice for individual conditions or treatments  Talk to your doctor, nurse or pharmacist before following any medical regimen to see if it is safe and effective for you

## 2022-02-22 NOTE — PROGRESS NOTES
[address] [employer]        NAME: Lisy Antunez is a 59 y o  female  : 1960    MRN: [id number]  DATE: 2022  TIME: 11:37 AM    Assessment and Plan   Recurrent left knee instability [M23 52]  1  Recurrent left knee instability  XR knee 3 vw left non injury    Ambulatory Referral to Orthopedic Surgery    Ambulatory Referral to Physical Therapy   2  Nasal congestion  fluticasone (FLONASE) 50 mcg/act nasal spray   3  Subconjunctival hemorrhage of right eye  CANCELED: Ambulatory Referral to Ophthalmology         Patient Instructions   Patient Instructions     Allergic Rhinitis   WHAT YOU NEED TO KNOW:   Allergic rhinitis, or hay fever, is swelling of the inside of your nose  The swelling is a reaction to allergens in the air  An allergen can be anything that causes an allergic reaction  Allergies to weeds, grass, trees, or mold often cause seasonal allergic rhinitis  Indoor dust mites, cockroaches, pet dander, or mold can also cause allergic rhinitis  DISCHARGE INSTRUCTIONS:   Call 911 for the following:   · You have chest pain or shortness of breath  Return to the emergency department if:   · You have severe pain  · You cough up blood  Contact your healthcare provider if:   · You have a fever  · You have ear or sinus pain, or a headache  · Your symptoms get worse, even after treatment  · You have yellow, green, brown, or bloody mucus coming from your nose  · Your nose is bleeding or you have pain inside your nose  · You have trouble sleeping because of your symptoms  · You have questions or concerns about your condition or care  Medicines:   · Medicines  help decrease your symptoms and clear your stuffy nose  · Take your medicine as directed  Contact your healthcare provider if you think your medicine is not helping or if you have side effects  Tell him of her if you are allergic to any medicine  Keep a list of the medicines, vitamins, and herbs you take  Include the amounts, and when and why you take them  Bring the list or the pill bottles to follow-up visits  Carry your medicine list with you in case of an emergency  How to manage allergic rhinitis:  The best way to manage allergic rhinitis is to avoid allergens that can trigger your symptoms  Any of the following may help decrease your symptoms:  · Rinse your nose and sinuses  with a salt water solution or use a salt water nasal spray  This will help thin the mucus in your nose and rinse away pollen and dirt  It will also help reduce swelling so you can breathe normally  Ask your healthcare provider how often to rinse your nose  · Reduce exposure to dust mites  Wash sheets and towels in hot water every week  Cover your pillows and mattresses with allergen-free covers  Limit the number of stuffed animals and soft toys your child has  Wash your child's toys in hot water regularly  Vacuum weekly and use a vacuum  with an air filter  If possible, get rid of carpets and curtains  These collect dust and dust mites  · Reduce exposure to pollen  Keep windows and doors closed in your house and car  Stay inside when air pollution or the pollen count is high  Run your air conditioner on recycle, and change air filters often  Shower and wash your hair before bed every night to rinse away pollen  · Reduce exposure to pet dander  If possible, do not keep cats, dogs, birds, or other pets  If you do keep pets in your home, keep them out of bedrooms and carpeted rooms  Bathe them often  · Reduce exposure to mold  Do not spend time in basements  Choose artificial plants instead of live plants  Keep your home's humidity at less than 45%  Do not have ponds or standing water in your home or yard  · Do not smoke  Avoid others who smoke  Ask your healthcare provider for information if you currently smoke and need help to quit  Follow up with your healthcare provider as directed:   You may need to see an allergist often to control your symptoms  Write down your questions so you remember to ask them during your visits  © Copyright [employer] 2021 Information is for End User's use only and may not be sold, redistributed or otherwise used for commercial purposes  All illustrations and images included in CareNotes® are the copyrighted property of A D A M , Inc  or Kia Campbell  The above information is an  only  It is not intended as medical advice for individual conditions or treatments  Talk to your doctor, nurse or pharmacist before following any medical regimen to see if it is safe and effective for you  Knee Pain   WHAT YOU NEED TO KNOW:   Knee pain may start suddenly, or it may be a long-term problem  You may have pain on the side, front, or back of your knee  You may have knee stiffness and swelling  You may hear popping sounds or feel like your knee is giving way or locking up as you walk  You may feel pain when you sit, stand, walk, or climb up and down stairs  Knee pain can be caused by conditions such as obesity, inflammation, or strains or tears in ligaments or tendons  DISCHARGE INSTRUCTIONS:   Return to the emergency department if:   · Your pain is worse, even after treatment  · You cannot bend or straighten your leg completely  · The swelling around your knee does not go down even with treatment  · Your knee is painful and hot to the touch  Contact your healthcare provider if:   · You have questions or concerns about your condition or care  Medicines: You may need any of the following:  · NSAIDs  help decrease swelling and pain or fever  This medicine is available with or without a doctor's order  NSAIDs can cause stomach bleeding or kidney problems in certain people  If you take blood thinner medicine, always ask your healthcare provider if NSAIDs are safe for you  Always read the medicine label and follow directions      · Acetaminophen  decreases pain and fever  It is available without a doctor's order  Ask how much to take and how often to take it  Follow directions  Read the labels of all other medicines you are using to see if they also contain acetaminophen, or ask your doctor or pharmacist  Acetaminophen can cause liver damage if not taken correctly  Do not use more than 4 grams (4,000 milligrams) total of acetaminophen in one day  · Prescription pain medicine  may be given  Ask your healthcare provider how to take this medicine safely  Some prescription pain medicines contain acetaminophen  Do not take other medicines that contain acetaminophen without talking to your healthcare provider  Too much acetaminophen may cause liver damage  Prescription pain medicine may cause constipation  Ask your healthcare provider how to prevent or treat constipation  · Take your medicine as directed  Contact your healthcare provider if you think your medicine is not helping or if you have side effects  Tell him or her if you are allergic to any medicine  Keep a list of the medicines, vitamins, and herbs you take  Include the amounts, and when and why you take them  Bring the list or the pill bottles to follow-up visits  Carry your medicine list with you in case of an emergency  What you can do to manage your symptoms:   · Rest your knee so it can heal   Limit activities that increase your pain  Do low-impact exercises, such as walking or swimming  · Apply ice to help reduce swelling and pain  Use an ice pack, or put crushed ice in a plastic bag  Cover it with a towel before you apply it to your knee  Apply ice for 15 to 20 minutes every hour, or as directed  · Apply compression to help reduce swelling  Use a brace or bandage only as directed  · Elevate your knee to help decrease pain and swelling  Elevate your knee while you are sitting or lying down  Prop your leg on pillows to keep your knee above the level of your heart      · Prevent your knee from moving as directed  Your healthcare provider may put on a cast or splint  You may need to wear a leg brace to stabilize your knee  A leg brace can be adjusted to increase your range of motion as your knee heals  What you can do to prevent knee pain:   · Maintain a healthy weight  Extra weight increases your risk for knee pain  Ask your healthcare provider how much you should weigh  He or she can help you create a safe weight loss plan if you need to lose weight  · Exercise or train properly  Use the correct equipment for sports  Wear shoes that provide good support  Check your posture often as you exercise, play sports, or train for an event  This can help prevent stress and strain on your knees  Rest between sessions so you do not overwork your knees  Follow up with your healthcare provider within 24 hours or as directed: You may need follow-up treatments, such as steroid injections to decrease pain  Write down your questions so you remember to ask them during your visits  © [employer] 2021 Information is for End User's use only and may not be sold, redistributed or otherwise used for commercial purposes  All illustrations and images included in CareNotes® are the copyrighted property of A D A M , Inc  or [address] [place] [address]  The above information is an  only  It is not intended as medical advice for individual conditions or treatments  Talk to your doctor, nurse or pharmacist before following any medical regimen to see if it is safe and effective for you  Follow up with PCP in 3-5 days  Proceed to  ER if symptoms worsen      Chief Complaint     Chief Complaint   Patient presents with    Knee Pain     left knee 3 weeks    Nasal Congestion         History of Present Illness       -The patient states she started with a sudden pain located behind her left knee about 3 weeks ago  -She states the pain is a constant "throbbing" pain    -She states the pain is worse when standing from a sitting position or ambulating  -She states the pain is now starting to radiate to the right thigh and the front of her leg  -She tried OTC medications, heat, and is on Mobic for Chronic lower back pain-Did not take Mobic this AM  -Pain is 10/10  -She also c/o instability  -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  -Could not see PCP because she also has Post nasal drip x 3 weeks  -Has used OTC medication  -Had Covid in [place] had Red right eye since having covid      Review of Systems   Review of Systems   Constitutional: Negative for chills and fever  HENT: Positive for congestion and postnasal drip  Negative for ear pain, sinus pressure, sinus pain, sore throat and tinnitus  Eyes: Negative for pain and visual disturbance  Respiratory: Negative for apnea, cough, shortness of breath, wheezing and stridor  Cardiovascular: Negative for chest pain and palpitations  Gastrointestinal: Negative for abdominal pain and vomiting  Genitourinary: Negative for dysuria and hematuria  Musculoskeletal: Positive for arthralgias  Negative for back pain, joint swelling and myalgias  Skin: Negative for color change, pallor, rash and wound  Neurological: Positive for numbness  Negative for dizziness, seizures, syncope and light-headedness  All other systems reviewed and are negative          Current Medications       Current Outpatient Medications:     glimepiride (AMARYL) 4 mg tablet, Take 1 tablet by mouth twice daily, Disp: 180 tablet, Rfl: 0    lisinopril (ZESTRIL) 10 mg tablet, Take 1 tablet by mouth once daily, Disp: 90 tablet, Rfl: 1    meloxicam (MOBIC) 15 mg tablet, Take 1 tablet by mouth once daily, Disp: 90 tablet, Rfl: 0    metFORMIN (GLUCOPHAGE) 1000 MG tablet, TAKE 1 TABLET BY MOUTH EVERY 12 HOURS, Disp: 180 tablet, Rfl: 1    atorvastatin (LIPITOR) 40 mg tablet, Take 1 tablet (40 mg total) by mouth daily (Patient not taking: Reported on 2/22/2022 ), Disp: 90 tablet, Rfl: 3    fluticasone (FLONASE) 50 mcg/act nasal spray, 1 spray into each nostril daily, Disp: 11 1 mL, Rfl: 0    metoprolol tartrate (LOPRESSOR) 100 mg tablet, Take by mouth (Patient not taking: Reported on 2/22/2022 ), Disp: , Rfl:     ranitidine (ZANTAC) 300 MG capsule, Take 1 capsule by mouth 2 (two) times a day OTC (Patient not taking: Reported on 2/22/2022 ), Disp: , Rfl:     triamcinolone (KENALOG) 0 1 % cream, Apply topically 2 (two) times a day (Patient not taking: Reported on 2/22/2022 ), Disp: 45 g, Rfl: 5    Current Allergies     Allergies as of 02/22/2022 - Reviewed 02/22/2022   Allergen Reaction Noted    Codeine  09/15/2017    Naproxen  09/15/2017    Pentobarbital Vomiting 09/02/2013    Sodium perborate  09/15/2017    Sulfa antibiotics  09/15/2017            The following portions of the patient's history were reviewed and updated as appropriate: allergies, current medications, past family history, past medical history, past social history, past surgical history and problem list      Past Medical History:   Diagnosis Date    Arthritis     Bursitis of both feet     Carpal tunnel syndrome, bilateral     Diabetes mellitus ([address] )     Hypertension        Past Surgical History:   Procedure Laterality Date    WISDOM TOOTH EXTRACTION         Family History   Problem Relation Age of Onset    Heart disease Mother         Cardiac Disorder    Diabetes Mother         Mellitus    Cancer Mother     Heart disease Father         Cardiac Disorder    Cancer Father     Diabetes Sister         Mellitus    Cancer Brother     Diabetes Family         Mellitus         Medications have been verified  Objective   There were no vitals taken for this visit  Physical Exam     Physical Exam  Constitutional:       Appearance: She is well-developed  She is obese  She is not diaphoretic  HENT:      Head: Normocephalic  Nose: Congestion and rhinorrhea present     Eyes:      General:         Right eye: No discharge  Left eye: No discharge  Extraocular Movements:      Right eye: Normal extraocular motion and no nystagmus  Left eye: Normal extraocular motion and no nystagmus  Conjunctiva/sclera:      Right eye: Hemorrhage present  No exudate  Pupils: Pupils are equal, round, and reactive to light  Comments: Patient has small area of subconjunctival bleeding at approximately 7 p m  and 11 p m  on the right eye  There is no erythema of the conjunctiva  Pupils equal round reactive to light and accommodation  Neck:      Thyroid: No thyromegaly  Cardiovascular:      Rate and Rhythm: Normal rate  Heart sounds: No murmur heard  Pulmonary:      Effort: Pulmonary effort is normal  No respiratory distress  Breath sounds: No wheezing, rhonchi or rales  Chest:      Chest wall: No tenderness  Abdominal:      General: There is no distension  Palpations: Abdomen is soft  Tenderness: There is no abdominal tenderness  There is no guarding or rebound  Musculoskeletal:         General: Normal range of motion  Cervical back: Normal range of motion  Right knee: Crepitus present  No LCL laxity, MCL laxity, ACL laxity or PCL laxity  Instability Tests: Medial Benito test negative and lateral Benito test negative  Left knee: Crepitus present  No LCL laxity, MCL laxity, ACL laxity or PCL laxity  Instability Tests: Medial Benito test positive  Lateral Benito test negative  Right lower leg: Swelling present  No tenderness  Left lower leg: Swelling present  No tenderness  Legs:       Comments: Patient has edema of the left knee with tenderness left medial aspect of the knee  There is crepitus noted with flexion of the left knee  There is no significant instability noted  Skin:     General: Skin is warm  Neurological:      Mental Status: She is alert and oriented to person, place, and time         -patient's nasal congestion is likely secondary to allergies  I will prescribe Flonase  She should follow up with ENT and her primary care doctor if symptoms fail to improve   -I suggest an ophthalmology consult since she has had the bleeding in the eye for over 3 weeks  She follows with someone for Diabetic eye exam     -x-ray of the left knee was evaluated  There is no acute fracture  She does have some mild arthritis changes noted he  Here the ER I will refer her to Ortho for further evaluation the the as she may need further evaluation with an MRI  Will also start physical therapy

## 2022-03-10 DIAGNOSIS — E11.9 TYPE 2 DIABETES MELLITUS WITHOUT COMPLICATION, WITHOUT LONG-TERM CURRENT USE OF INSULIN (HCC): ICD-10-CM

## 2022-03-10 DIAGNOSIS — R52 PAIN: ICD-10-CM

## 2022-03-10 DIAGNOSIS — E11.65 TYPE 2 DIABETES MELLITUS WITH HYPERGLYCEMIA, WITHOUT LONG-TERM CURRENT USE OF INSULIN (HCC): ICD-10-CM

## 2022-03-10 RX ORDER — GLIMEPIRIDE 4 MG/1
4 TABLET ORAL 2 TIMES DAILY
Qty: 180 TABLET | Refills: 0 | Status: SHIPPED | OUTPATIENT
Start: 2022-03-10 | End: 2022-03-24 | Stop reason: SDUPTHER

## 2022-03-10 RX ORDER — MELOXICAM 15 MG/1
TABLET ORAL
Qty: 90 TABLET | Refills: 0 | Status: SHIPPED | OUTPATIENT
Start: 2022-03-10 | End: 2022-03-28 | Stop reason: SDUPTHER

## 2022-03-10 RX ORDER — MELOXICAM 15 MG/1
15 TABLET ORAL DAILY
Qty: 90 TABLET | Refills: 0 | Status: SHIPPED | OUTPATIENT
Start: 2022-03-10 | End: 2022-03-10

## 2022-03-21 ENCOUNTER — RA CDI HCC (OUTPATIENT)
Dept: OTHER | Facility: HOSPITAL | Age: 62
End: 2022-03-21

## 2022-03-21 NOTE — PROGRESS NOTES
The following dx found on active problem list - please assess using MEAT for 2022 billing    E66 01: Morbid (severe) obesity due to excess calories ([facility] [address] ) -     Per CMS/ICD 10 coding guidelines, BMI of 40 or higher; Class 3 obesity is sometimes categorized as "morbid," "extreme," or "severe" obesity      [address]  coding opportunities          Chart Reviewed number of suggestions sent to Provider: 1     Patients Insurance        Commercial Insurance: [employer]

## 2022-03-24 DIAGNOSIS — E11.65 TYPE 2 DIABETES MELLITUS WITH HYPERGLYCEMIA, WITHOUT LONG-TERM CURRENT USE OF INSULIN (HCC): ICD-10-CM

## 2022-03-24 RX ORDER — GLIMEPIRIDE 4 MG/1
4 TABLET ORAL 2 TIMES DAILY
Qty: 30 TABLET | Refills: 0 | Status: SHIPPED | OUTPATIENT
Start: 2022-03-24 | End: 2022-03-28 | Stop reason: SDUPTHER

## 2022-03-28 ENCOUNTER — OFFICE VISIT (OUTPATIENT)
Dept: FAMILY MEDICINE CLINIC | Facility: CLINIC | Age: 62
End: 2022-03-28
Payer: COMMERCIAL

## 2022-03-28 ENCOUNTER — TELEPHONE (OUTPATIENT)
Dept: ADMINISTRATIVE | Facility: OTHER | Age: 62
End: 2022-03-28

## 2022-03-28 VITALS
OXYGEN SATURATION: 97 % | HEART RATE: 85 BPM | WEIGHT: 219 LBS | DIASTOLIC BLOOD PRESSURE: 78 MMHG | HEIGHT: 60 IN | BODY MASS INDEX: 43 KG/M2 | TEMPERATURE: 97.9 F | SYSTOLIC BLOOD PRESSURE: 134 MMHG

## 2022-03-28 DIAGNOSIS — Z79.4 TYPE 2 DIABETES MELLITUS WITH OTHER SPECIFIED COMPLICATION, WITH LONG-TERM CURRENT USE OF INSULIN (HCC): ICD-10-CM

## 2022-03-28 DIAGNOSIS — R52 PAIN: ICD-10-CM

## 2022-03-28 DIAGNOSIS — Z12.31 ENCOUNTER FOR SCREENING MAMMOGRAM FOR BREAST CANCER: ICD-10-CM

## 2022-03-28 DIAGNOSIS — I10 HYPERTENSION, ESSENTIAL: ICD-10-CM

## 2022-03-28 DIAGNOSIS — E11.69 TYPE 2 DIABETES MELLITUS WITH OTHER SPECIFIED COMPLICATION, WITH LONG-TERM CURRENT USE OF INSULIN (HCC): ICD-10-CM

## 2022-03-28 DIAGNOSIS — Z12.4 SCREENING FOR CERVICAL CANCER: ICD-10-CM

## 2022-03-28 DIAGNOSIS — Z23 ENCOUNTER FOR IMMUNIZATION: ICD-10-CM

## 2022-03-28 DIAGNOSIS — E11.9 TYPE 2 DIABETES MELLITUS WITHOUT COMPLICATION, WITHOUT LONG-TERM CURRENT USE OF INSULIN (HCC): ICD-10-CM

## 2022-03-28 DIAGNOSIS — Z11.4 SCREENING FOR HIV (HUMAN IMMUNODEFICIENCY VIRUS): ICD-10-CM

## 2022-03-28 DIAGNOSIS — E11.65 TYPE 2 DIABETES MELLITUS WITH HYPERGLYCEMIA, WITHOUT LONG-TERM CURRENT USE OF INSULIN (HCC): ICD-10-CM

## 2022-03-28 DIAGNOSIS — Z11.59 NEED FOR HEPATITIS C SCREENING TEST: ICD-10-CM

## 2022-03-28 LAB — SL AMB POCT HEMOGLOBIN AIC: 9 (ref ?–6.5)

## 2022-03-28 PROCEDURE — 99214 OFFICE O/P EST MOD 30 MIN: CPT | Performed by: FAMILY MEDICINE

## 2022-03-28 PROCEDURE — 3725F SCREEN DEPRESSION PERFORMED: CPT | Performed by: FAMILY MEDICINE

## 2022-03-28 PROCEDURE — 4010F ACE/ARB THERAPY RXD/TAKEN: CPT | Performed by: FAMILY MEDICINE

## 2022-03-28 PROCEDURE — 3078F DIAST BP <80 MM HG: CPT | Performed by: FAMILY MEDICINE

## 2022-03-28 PROCEDURE — 3052F HG A1C>EQUAL 8.0%<EQUAL 9.0%: CPT | Performed by: FAMILY MEDICINE

## 2022-03-28 PROCEDURE — 3008F BODY MASS INDEX DOCD: CPT | Performed by: FAMILY MEDICINE

## 2022-03-28 PROCEDURE — 83036 HEMOGLOBIN GLYCOSYLATED A1C: CPT | Performed by: FAMILY MEDICINE

## 2022-03-28 RX ORDER — GLIMEPIRIDE 4 MG/1
4 TABLET ORAL 2 TIMES DAILY
Qty: 180 TABLET | Refills: 1 | Status: SHIPPED | OUTPATIENT
Start: 2022-03-28

## 2022-03-28 RX ORDER — MELOXICAM 15 MG/1
15 TABLET ORAL DAILY
Qty: 90 TABLET | Refills: 0 | Status: SHIPPED | OUTPATIENT
Start: 2022-03-28

## 2022-03-28 RX ORDER — LISINOPRIL 10 MG/1
10 TABLET ORAL DAILY
Qty: 90 TABLET | Refills: 1 | Status: SHIPPED | OUTPATIENT
Start: 2022-03-28

## 2022-03-28 NOTE — PROGRESS NOTES
Diabetic Foot Exam    Patient's shoes and socks removed  Right Foot/Ankle   Right Foot Inspection      Sensory   Vibration: intact  Proprioception: intact  Monofilament testing: intact    Vascular  Capillary refills: < 3 seconds  The right DP pulse is 2+  The right PT pulse is 2+  Left Foot/Ankle  Left Foot Inspection  Skin Exam: skin normal and skin intact  No dry skin, no warmth, no erythema, no maceration, normal color, no pre-ulcer, no ulcer and no callus  Sensory   Vibration: intact  Proprioception: intact  Monofilament testing: intact    Vascular  Capillary refills: < 3 seconds  The left DP pulse is 2+  The left PT pulse is 2+       Assign Risk Category  No deformity present  No loss of protective sensation  No weak pulses  Risk: 0

## 2022-03-28 NOTE — PATIENT INSTRUCTIONS
10% - bad control"> 10% - bad control,Hemoglobin A1c (HbA1c) greater than 10% indicating poor diabetic control,Haemoglobin A1c greater than 10% indicating poor diabetic control">   Diabetes Mellitus Type 2 in Adults, Ambulatory Care   GENERAL INFORMATION:   Diabetes mellitus type 2  is a disease that affects how your body uses glucose (sugar)  Insulin helps move sugar out of the blood so it can be used for energy  Normally, when the blood sugar level increases, the pancreas makes more insulin  Type 2 diabetes develops because either the body cannot make enough insulin, or it cannot use the insulin correctly  After many years, your pancreas may stop making insulin  Common symptoms include the following:   · More hunger or thirst than usual     · Frequent urination     · Weight loss without trying     · Blurred vision  Seek immediate care for the following symptoms:   · Severe abdominal pain, or pain that spreads to your back  You may also be vomiting  · Trouble staying awake or focusing    · Shaking or sweating    · Blurred or double vision    · Breath has a fruity, sweet smell    · Breathing is deep and labored, or rapid and shallow    · Heartbeat is fast and weak  Treatment for diabetes mellitus type 2  includes keeping your blood sugar at a normal level  You must eat the right foods, and exercise regularly  You may also need medicine if you cannot control your blood sugar level with nutrition and exercise  Manage diabetes mellitus type 2:   · Check your blood sugar level  You will be taught how to check a small drop of blood in a glucose monitor  Ask your healthcare provider when and how often to check during the day  Ask your healthcare provider what your blood sugar levels should be when you check them  · Keep track of carbohydrates (sugar and starchy foods)  Your blood sugar level can get too high if you eat too many carbohydrates   Your dietitian will help you plan meals and snacks that have the right amount of carbohydrates  · Eat low-fat foods  Some examples are skinless chicken and low-fat milk  · Eat less sodium (salt)  Some examples of high-sodium foods to limit are soy sauce, potato chips, and soup  Do not add salt to food you cook  Limit your use of table salt  · Eat high-fiber foods  Foods that are a good source of fiber include vegetables, whole grain bread, and beans  · Limit alcohol  Alcohol affects your blood sugar level and can make it harder to manage your diabetes  Women should limit alcohol to 1 drink a day  Men should limit alcohol to 2 drinks a day  A drink of alcohol is 12 ounces of beer, 5 ounces of wine, or 1½ ounces of liquor  · Get regular exercise  Exercise can help keep your blood sugar level steady, decrease your risk of heart disease, and help you lose weight  Exercise for at least 30 minutes, 5 days a week  Include muscle strengthening activities 2 days each week  Work with your healthcare provider to create an exercise plan  · Check your feet each day  for injuries or open sores  Ask your healthcare provider for activities you can do if you have an open sore  · Quit smoking  If you smoke, it is never too late to quit  Smoking can worsen the problems that may occur with diabetes  Ask your healthcare provider for information about how to stop smoking if you are having trouble quitting  · Ask about your weight:  Ask healthcare providers if you need to lose weight, and how much to lose  Ask them to help you with a weight loss program  Even a 10 to 15 pound weight loss can help you manage your blood sugar level  · Carry medical alert identification  Wear medical alert jewelry or carry a card that says you have diabetes  Ask your healthcare provider where to get these items  · Ask about vaccines  Diabetes puts you at risk of serious illness if you get the flu, pneumonia, or hepatitis   Ask your healthcare provider if you should get a flu, pneumonia, or hepatitis B vaccine, and when to get the vaccine  Follow up with your healthcare provider as directed:  Write down your questions so you remember to ask them during your visits  CARE AGREEMENT:   You have the right to help plan your care  Learn about your health condition and how it may be treated  Discuss treatment options with your caregivers to decide what care you want to receive  You always have the right to refuse treatment  The above information is an  only  It is not intended as medical advice for individual conditions or treatments  Talk to your doctor, nurse or pharmacist before following any medical regimen to see if it is safe and effective for you  © 2014 [address] is for End User's use only and may not be sold, redistributed or otherwise used for commercial purposes  All illustrations and images included in CareNotes® are the copyrighted property of [employer]  or Navid Shah  Foot Care for People with Diabetes   AMBULATORY CARE:   What you need to know about foot care:   · Foot care helps protect your feet and prevent foot ulcers or sores  Long-term high blood sugar levels can damage the blood vessels and nerves in your legs and feet  This damage makes it hard to feel pressure, pain, temperature, and touch  You may not be able to feel a cut or sore, or shoes that are too tight  Foot care is needed to prevent serious problems, such as an infection or amputation  · Diabetes may cause your toes to become crooked or curved under  These changes may affect the way you walk and can lead to increased pressure on your foot  The pressure can decrease blood flow to your feet  Lack of blood flow increases your risk for a foot ulcer  Do not ignore small problems, such as dry skin or small wounds  These can become life-threatening over time without proper care      Call your care team provider if:   · Your feet become numb, weak, or hard to move  · You have pus draining from a sore on your foot  · You have a wound on your foot that gets bigger, deeper, or does not heal      · You see blisters, cuts, scratches, calluses, or sores on your foot  · You have a fever, and your feet become red, warm, and swollen  · Your toenails become thick, curled, or yellow  · You find it hard to check your feet because your vision is poor  · You have questions or concerns about your condition or care  How to care for your feet:   · Check your feet each day  Look at your whole foot, including the bottom, and between and under your toes  Check for wounds, corns, and calluses  Use a mirror to see the bottom of your feet  The skin on your feet may be shiny, tight, or darker than normal  Your feet may also be cold and pale  Feel your feet by running your hands along the tops, bottoms, sides, and between your toes  Redness, swelling, and warmth are signs of blood flow problems that can lead to a foot ulcer  Do not try to remove corns or calluses yourself  · Wash your feet each day with soap and warm water  Do not use hot water, because this can injure your foot  Dry your feet gently with a towel after you wash them  Dry between and under your toes  · Apply lotion or a moisturizer on your dry feet  Ask your care team provider what lotions are best to use  Do not put lotion or moisturizer between your toes  Moisture between your toes could lead to skin breakdown  · Cut your toenails correctly  File or cut your toenails straight across  Use a soft brush to clean around your toenails  If your toenails are very thick, you may need to have a care team provider or specialist cut them  · Protect your feet  Do not walk barefoot or wear your shoes without socks  Check your shoes for rocks or other objects that can hurt your feet  Wear cotton socks to help keep your feet dry   Wear socks without toe seams, or wear them with the seams inside out  Change your socks each day  Do not wear socks that are dirty or damp  · Wear shoes that fit well  Wear shoes that do not rub against any area of your feet  Your shoes should be ½ to ¾ inch (1 to 2 centimeters) longer than your feet  Your shoes should also have extra space around the widest part of your feet  Walking or athletic shoes with laces or straps that adjust are best  Ask your care team provider for help to choose shoes that fit you best  Ask him or her if you need to wear an insert, orthotic, or bandage on your feet  · Go to your follow-up visits  Your care team provider will do a foot exam at least once a year  You may need a foot exam more often if you have nerve damage, foot deformities, or ulcers  He will check for nerve damage and how well you can feel your feet  He will check your shoes to see if they fit well  · Do not smoke  Smoking can damage your blood vessels and put you at increased risk for foot ulcers  Ask your care team provider for information if you currently smoke and need help to quit  E-cigarettes or smokeless tobacco still contain nicotine  Talk to your care team provider before you use these products  Follow up with your diabetes care team provider or foot specialist as directed: You will need to have your feet checked at least once a year  You may need a foot exam more often if you have nerve damage, foot deformities, or ulcers  Write down your questions so you remember to ask them during your visits  © Copyright [employer] 2022 Information is for End User's use only and may not be sold, redistributed or otherwise used for commercial purposes  All illustrations and images included in CareNotes® are the copyrighted property of [employer]  or Kia Wynn   The above information is an  only  It is not intended as medical advice for individual conditions or treatments   Talk to your doctor, nurse or pharmacist before following any medical regimen to see if it is safe and effective for you

## 2022-03-28 NOTE — TELEPHONE ENCOUNTER
Upon review of the In Basket request and the patient's chart, initial outreach has been made via fax, please see Contacts section for details       Thank you  Costa Curran MA

## 2022-03-28 NOTE — PROGRESS NOTES
Assessment/Plan:    No problem-specific Assessment & Plan notes found for this encounter  Diagnoses and all orders for this visit:    Need for hepatitis C screening test  -     Hepatitis C Antibody ([facility]); Future    Encounter for immunization    Screening for HIV (human immunodeficiency virus)  -     HIV 1/2 Antigen/Antibody (4th Generation) w Reflex [facility]; Future    Screening for cervical cancer  -     Ambulatory referral to Obstetrics / Gynecology; Future    Encounter for screening mammogram for breast cancer  -     Mammo screening bilateral w 3d & cad; Future    Type 2 diabetes mellitus without complication, without long-term current use of insulin ([facility] [address] )  -     Ambulatory Referral to Ophthalmology; Future  -     POCT hemoglobin A1c  -     Lipid panel; Future  -     Comprehensive metabolic panel; Future  -     Microalbumin / creatinine urine ratio  -     TSH, 3rd generation; Future    Type 2 diabetes mellitus with other specified complication, with long-term current use of insulin (HCC)  -     Lipid panel; Future  -     Comprehensive metabolic panel; Future  -     Microalbumin / creatinine urine ratio  -     TSH, 3rd generation; Future          Subjective:      Patient ID: Twila García is a 59 y o  female  Her BP is at goal   She has no cough  She has no HA or vision changes  She has no CP or SOB  She has not had lipids drawn recently  She is not taking Lipitor  "I don't need it "      Her A1c is not at goal   She has been eating Girl  cookies and breads  She refuses a change in her diabetes regimen today  The following portions of the patient's history were reviewed and updated as appropriate:   She  has a past medical history of Arthritis, Bursitis of both feet, Carpal tunnel syndrome, bilateral, Diabetes mellitus ([address] ), and Hypertension    She   Patient Active Problem List    Diagnosis Date Noted    Decreased GFR 09/20/2019    Morbid obesity with BMI of 40 0-44 9, adult ([facility] [address] ) 09/20/2018    Rash 09/20/2018    Hyperlipidemia 12/07/2017    Seasonal allergies 12/07/2017    Type 2 diabetes mellitus with other specified complication, with long-term current use of insulin ([facility] ) 09/15/2017    Hypertension, essential 09/15/2017    GERD without esophagitis 09/15/2017     She  has a past surgical history that includes [place] tooth extraction  Her family history includes Cancer in her brother, father, and mother; Diabetes in her family, mother, and sister; Heart disease in her father and mother  She  reports that she has never smoked  She has never used smokeless tobacco  She reports that she does not drink alcohol and does not use drugs  Current Outpatient Medications   Medication Sig Dispense Refill    glimepiride (AMARYL) 4 mg tablet Take 1 tablet (4 mg total) by mouth 2 (two) times a day 30 tablet 0    lisinopril (ZESTRIL) 10 mg tablet Take 1 tablet by mouth once daily 90 tablet 1    meloxicam (MOBIC) 15 mg tablet TAKE 1 TABLET BY MOUTH ONCE DAILY 90 tablet 0    metFORMIN (GLUCOPHAGE) 1000 MG tablet Take 1 tablet (1,000 mg total) by mouth every 12 (twelve) hours 180 tablet 0    fluticasone (FLONASE) 50 mcg/act nasal spray 1 spray into each nostril daily (Patient not taking: Reported on 3/28/2022 ) 11 1 mL 0     No current facility-administered medications for this visit       Current Outpatient Medications on File Prior to Visit   Medication Sig    glimepiride (AMARYL) 4 mg tablet Take 1 tablet (4 mg total) by mouth 2 (two) times a day    lisinopril (ZESTRIL) 10 mg tablet Take 1 tablet by mouth once daily    meloxicam (MOBIC) 15 mg tablet TAKE 1 TABLET BY MOUTH ONCE DAILY    metFORMIN (GLUCOPHAGE) 1000 MG tablet Take 1 tablet (1,000 mg total) by mouth every 12 (twelve) hours    fluticasone (FLONASE) 50 mcg/act nasal spray 1 spray into each nostril daily (Patient not taking: Reported on 3/28/2022 )    [DISCONTINUED] atorvastatin (LIPITOR) 40 mg tablet Take 1 tablet (40 mg total) by mouth daily (Patient not taking: Reported on 2/22/2022 )    [DISCONTINUED] metoprolol tartrate (LOPRESSOR) 100 mg tablet Take by mouth (Patient not taking: Reported on 2/22/2022 )    [DISCONTINUED] ranitidine (ZANTAC) 300 MG capsule Take 1 capsule by mouth 2 (two) times a day OTC (Patient not taking: Reported on 2/22/2022 )    [DISCONTINUED] triamcinolone (KENALOG) 0 1 % cream Apply topically 2 (two) times a day (Patient not taking: Reported on 2/22/2022 )     No current facility-administered medications on file prior to visit  She is allergic to codeine, naproxen, pentobarbital, sodium perborate, and sulfa antibiotics       Review of Systems   All other systems reviewed and are negative  Objective:      /78   Pulse 85   Temp 97 9 °F (36 6 °C)   Ht 5' (1 524 m)   Wt 99 3 kg (219 lb)   SpO2 97%   BMI 42 77 kg/m²          Physical Exam  Vitals and nursing note reviewed  Constitutional:       Appearance: Normal appearance  She is obese  HENT:      Head: Normocephalic and atraumatic  Cardiovascular:      Rate and Rhythm: Normal rate and regular rhythm  Pulses: Normal pulses  Heart sounds: Normal heart sounds  Pulmonary:      Effort: Pulmonary effort is normal       Breath sounds: Normal breath sounds  Abdominal:      General: Abdomen is flat  Bowel sounds are normal       Palpations: Abdomen is soft  Musculoskeletal:         General: Normal range of motion  Cervical back: Normal range of motion  Skin:     General: Skin is warm  Capillary Refill: Capillary refill takes less than 2 seconds  Neurological:      General: No focal deficit present  Mental Status: She is alert  Mental status is at baseline  Psychiatric:         Mood and Affect: Mood normal          Behavior: Behavior normal          Thought Content:  Thought content normal          Judgment: Judgment normal

## 2022-03-28 NOTE — LETTER
Diabetic Eye Exam Form    Date Requested: 22  Patient: Bhavya Beaver  Patient : 1960   Referring Provider: Mary Quick MD    DIABETIC Eye Exam Date _______________________________    Type of Exam MUST be documented for Diabetic Eye Exams  Please CHECK ONE  Dilated Retinal Exam      Optomap-Iris Exam      Fundus Photography Completed       Left Eye - Please check Retinopathy AND Type or No Retinopathy      Exam did show retinopathy    Exam did not show retinopathy         Mild     Proliferative           Moderate    Severe            None         Right Eye - Please check Retinopathy AND Type or No Retinopathy     Exam did show retinopathy    Exam did not show retinopathy         Mild     Proliferative        Moderate    Severe        None       Comments __________________________________________________________    Practice Providing Exam ______________________________________________    Exam Performed By (print name) _______________________________________      Provider Signature ___________________________________________________    These reports are needed for  compliance  Please fax this completed form and a copy of the Diabetic Eye Exam report to our office located at [place] [address] as soon as possible via [phone] [facility]: Phone [phone]    We thank you for your assistance in treating our mutual patient

## 2022-03-28 NOTE — LETTER
Diabetic Eye Exam Form       Second Request  Date Requested: 22  Patient: Shai Mora  Patient : 1960   Referring Provider: Thor Lazo MD    DIABETIC Eye Exam Date _______________________________    Type of Exam MUST be documented for Diabetic Eye Exams  Please CHECK ONE  Dilated Retinal Exam      Optomap-Iris Exam      Fundus Photography Completed       Left Eye - Please check Retinopathy AND Type or No Retinopathy      Exam did show retinopathy    Exam did not show retinopathy         Mild     Proliferative           Moderate    Severe            None         Right Eye - Please check Retinopathy AND Type or No Retinopathy     Exam did show retinopathy    Exam did not show retinopathy         Mild     Proliferative        Moderate    Severe        None       Comments __________________________________________________________    Practice Providing Exam ______________________________________________    Exam Performed By (print name) _______________________________________      Provider Signature ___________________________________________________    These reports are needed for  compliance  Please fax this completed form and a copy of the Diabetic Eye Exam report to our office located at [place] [address] as soon as possible via [phone] [facility]: Phone [phone]    We thank you for your assistance in treating our mutual patient

## 2022-03-28 NOTE — PROGRESS NOTES
BMI Counseling: Body mass index is 42 77 kg/m²  The BMI is above normal  Nutrition recommendations include reducing portion sizes, decreasing overall calorie intake, 3-5 servings of fruits/vegetables daily, reducing fast food intake, consuming healthier snacks, decreasing soda and/or juice intake, moderation in carbohydrate intake, increasing intake of lean protein, reducing intake of saturated fat and trans fat and reducing intake of cholesterol  Exercise recommendations include moderate aerobic physical activity for 150 minutes/week, vigorous aerobic physical activity for 75 minutes/week, exercising 3-5 times per week, joining a gym and strength training exercises

## 2022-03-28 NOTE — TELEPHONE ENCOUNTER
----- Message from Nasrin Curiel sent at 3/28/2022  8:17 AM EDT -----  Regarding: Care Gap Request  03/28/22 8:17 AM    Hello, our patient attached above has had eye exam completed/performed  Please assist in updating the patient chart    The date of service is 2021, per [facility] was [facility] at [facility]    Thank you,  Ledy Maciel  [facility]

## 2022-03-28 NOTE — LETTER
Diabetic Eye Exam Form    Date Requested: 22  Patient: Danilo Carnes  Patient : 1960   Referring Provider: Anna Martinez MD    DIABETIC Eye Exam Date _______________________________    Type of Exam MUST be documented for Diabetic Eye Exams  Please CHECK ONE  Retinal Exam       Dilated Retinal Exam       OCT       Optomap-Iris Exam      Fundus Photography       Left Eye - Please check Retinopathy AND Type or No Retinopathy      Exam did show retinopathy    Exam did not show retinopathy         Mild     Proliferative           Moderate    Severe            None         Right Eye - Please check Retinopathy AND Type or No Retinopathy     Exam did show retinopathy    Exam did not show retinopathy         Mild     Proliferative        Moderate    Severe        None       Comments __________________________________________________________    Practice Providing Exam ______________________________________________    Exam Performed By (print name) _______________________________________      Provider Signature ___________________________________________________    These reports are needed for  compliance  Please fax this completed form and a copy of the Diabetic Eye Exam report to our office located at [place] [address] as soon as possible via [phone] [facility]: Phone [phone]    We thank you for your assistance in treating our mutual patient

## 2022-04-04 NOTE — TELEPHONE ENCOUNTER
As a follow-up, a second attempt has been made for outreach via fax, please see Contacts section for details      Thank you  Jose Allen MA

## 2022-04-06 NOTE — TELEPHONE ENCOUNTER
Upon review of the In Basket request we have found as a result of outreach that patient did not have the requested item(s) completed  Any additional questions or concerns should be emailed to the Practice Liaisons via [email or web address] email, please do not reply via In Basket      Thank you  Keith Sherman MA

## 2022-05-18 ENCOUNTER — TELEPHONE (OUTPATIENT)
Dept: FAMILY MEDICINE CLINIC | Facility: CLINIC | Age: 62
End: 2022-05-18

## 2022-05-18 NOTE — TELEPHONE ENCOUNTER
Called pt to see if she want to schedule mammo, but did not get an answer, but I did let her know she can call the office if she want to schedule appt

## 2022-06-30 ENCOUNTER — VBI (OUTPATIENT)
Dept: ADMINISTRATIVE | Facility: OTHER | Age: 62
End: 2022-06-30

## 2022-07-26 ENCOUNTER — VBI (OUTPATIENT)
Dept: ADMINISTRATIVE | Facility: OTHER | Age: 62
End: 2022-07-26

## 2022-08-22 ENCOUNTER — VBI (OUTPATIENT)
Dept: ADMINISTRATIVE | Facility: OTHER | Age: 62
End: 2022-08-22

## 2022-08-25 ENCOUNTER — VBI (OUTPATIENT)
Dept: ADMINISTRATIVE | Facility: OTHER | Age: 62
End: 2022-08-25

## 2022-08-29 DIAGNOSIS — R52 PAIN: ICD-10-CM

## 2022-08-29 DIAGNOSIS — E11.65 TYPE 2 DIABETES MELLITUS WITH HYPERGLYCEMIA, WITHOUT LONG-TERM CURRENT USE OF INSULIN (HCC): ICD-10-CM

## 2022-08-29 DIAGNOSIS — I10 HYPERTENSION, ESSENTIAL: ICD-10-CM

## 2022-08-29 DIAGNOSIS — E11.9 TYPE 2 DIABETES MELLITUS WITHOUT COMPLICATION, WITHOUT LONG-TERM CURRENT USE OF INSULIN (HCC): ICD-10-CM

## 2022-08-29 DIAGNOSIS — E11.69 TYPE 2 DIABETES MELLITUS WITH OTHER SPECIFIED COMPLICATION, WITH LONG-TERM CURRENT USE OF INSULIN (HCC): ICD-10-CM

## 2022-08-29 DIAGNOSIS — Z79.4 TYPE 2 DIABETES MELLITUS WITH OTHER SPECIFIED COMPLICATION, WITH LONG-TERM CURRENT USE OF INSULIN (HCC): ICD-10-CM

## 2022-08-30 RX ORDER — LISINOPRIL 10 MG/1
TABLET ORAL
Qty: 90 TABLET | Refills: 0 | Status: SHIPPED | OUTPATIENT
Start: 2022-08-30

## 2022-08-30 RX ORDER — GLIMEPIRIDE 4 MG/1
TABLET ORAL
Qty: 180 TABLET | Refills: 0 | Status: SHIPPED | OUTPATIENT
Start: 2022-08-30

## 2022-08-30 RX ORDER — MELOXICAM 15 MG/1
TABLET ORAL
Qty: 90 TABLET | Refills: 0 | Status: SHIPPED | OUTPATIENT
Start: 2022-08-30

## 2022-10-26 ENCOUNTER — TELEPHONE (OUTPATIENT)
Dept: FAMILY MEDICINE CLINIC | Facility: CLINIC | Age: 62
End: 2022-10-26

## 2022-10-26 NOTE — TELEPHONE ENCOUNTER
Left message for patient to call back to reschedule 2/20/23 due to provider schedule changing    Letter sent

## 2022-10-26 NOTE — LETTER
Date: 10/26/2022    Lavonne Saini  [address] [address]    Dear Lavonne Saini,      We have attempted to reach you regarding your upcoming appointment on February 20, 2023 with Dr Senia Beltre  Unfortunately, due to a change in the provider's schedule we need to change your appointment  Please call our office as soon as possible so we can reschedule your appointment  We apologize for any inconvenience this may cause  Thank you in advance for your cooperation and assistance            Sincerely,  Hilda Zhang

## 2022-11-03 ENCOUNTER — VBI (OUTPATIENT)
Dept: ADMINISTRATIVE | Facility: OTHER | Age: 62
End: 2022-11-03

## 2022-11-17 ENCOUNTER — VBI (OUTPATIENT)
Dept: ADMINISTRATIVE | Facility: OTHER | Age: 62
End: 2022-11-17

## 2022-11-29 DIAGNOSIS — R52 PAIN: ICD-10-CM

## 2022-11-30 ENCOUNTER — VBI (OUTPATIENT)
Dept: ADMINISTRATIVE | Facility: OTHER | Age: 62
End: 2022-11-30

## 2022-11-30 RX ORDER — MELOXICAM 15 MG/1
TABLET ORAL
Qty: 90 TABLET | Refills: 0 | Status: SHIPPED | OUTPATIENT
Start: 2022-11-30

## 2022-12-07 ENCOUNTER — VBI (OUTPATIENT)
Dept: ADMINISTRATIVE | Facility: OTHER | Age: 62
End: 2022-12-07

## 2022-12-29 ENCOUNTER — VBI (OUTPATIENT)
Dept: ADMINISTRATIVE | Facility: OTHER | Age: 62
End: 2022-12-29

## 2023-01-07 DIAGNOSIS — E11.9 TYPE 2 DIABETES MELLITUS WITHOUT COMPLICATION, WITHOUT LONG-TERM CURRENT USE OF INSULIN (HCC): ICD-10-CM

## 2023-01-09 ENCOUNTER — VBI (OUTPATIENT)
Dept: ADMINISTRATIVE | Facility: OTHER | Age: 63
End: 2023-01-09

## 2023-02-13 DIAGNOSIS — E11.69 TYPE 2 DIABETES MELLITUS WITH OTHER SPECIFIED COMPLICATION, WITH LONG-TERM CURRENT USE OF INSULIN (HCC): ICD-10-CM

## 2023-02-13 DIAGNOSIS — E11.65 TYPE 2 DIABETES MELLITUS WITH HYPERGLYCEMIA, WITHOUT LONG-TERM CURRENT USE OF INSULIN (HCC): ICD-10-CM

## 2023-02-13 DIAGNOSIS — I10 HYPERTENSION, ESSENTIAL: ICD-10-CM

## 2023-02-13 DIAGNOSIS — Z79.4 TYPE 2 DIABETES MELLITUS WITH OTHER SPECIFIED COMPLICATION, WITH LONG-TERM CURRENT USE OF INSULIN (HCC): ICD-10-CM

## 2023-02-13 RX ORDER — GLIMEPIRIDE 4 MG/1
TABLET ORAL
Qty: 180 TABLET | Refills: 0 | Status: SHIPPED | OUTPATIENT
Start: 2023-02-13

## 2023-02-13 RX ORDER — LISINOPRIL 10 MG/1
TABLET ORAL
Qty: 90 TABLET | Refills: 0 | Status: SHIPPED | OUTPATIENT
Start: 2023-02-13

## 2023-03-03 DIAGNOSIS — R52 PAIN: ICD-10-CM

## 2023-03-06 RX ORDER — MELOXICAM 15 MG/1
TABLET ORAL
Qty: 90 TABLET | Refills: 0 | Status: SHIPPED | OUTPATIENT
Start: 2023-03-06

## 2023-04-20 DIAGNOSIS — R52 PAIN: ICD-10-CM

## 2023-04-20 DIAGNOSIS — E11.9 TYPE 2 DIABETES MELLITUS WITHOUT COMPLICATION, WITHOUT LONG-TERM CURRENT USE OF INSULIN (HCC): ICD-10-CM

## 2023-04-20 DIAGNOSIS — E11.65 TYPE 2 DIABETES MELLITUS WITH HYPERGLYCEMIA, WITHOUT LONG-TERM CURRENT USE OF INSULIN (HCC): ICD-10-CM

## 2023-04-20 DIAGNOSIS — Z79.4 TYPE 2 DIABETES MELLITUS WITH OTHER SPECIFIED COMPLICATION, WITH LONG-TERM CURRENT USE OF INSULIN (HCC): ICD-10-CM

## 2023-04-20 DIAGNOSIS — E11.69 TYPE 2 DIABETES MELLITUS WITH OTHER SPECIFIED COMPLICATION, WITH LONG-TERM CURRENT USE OF INSULIN (HCC): ICD-10-CM

## 2023-04-20 DIAGNOSIS — I10 HYPERTENSION, ESSENTIAL: ICD-10-CM

## 2023-04-20 RX ORDER — MELOXICAM 15 MG/1
15 TABLET ORAL DAILY
Qty: 90 TABLET | Refills: 0 | OUTPATIENT
Start: 2023-04-20

## 2023-04-20 RX ORDER — GLIMEPIRIDE 4 MG/1
4 TABLET ORAL 2 TIMES DAILY
Qty: 180 TABLET | Refills: 0 | OUTPATIENT
Start: 2023-04-20

## 2023-04-20 RX ORDER — LISINOPRIL 10 MG/1
10 TABLET ORAL DAILY
Qty: 90 TABLET | Refills: 0 | OUTPATIENT
Start: 2023-04-20

## 2023-04-24 DIAGNOSIS — Z79.4 TYPE 2 DIABETES MELLITUS WITH OTHER SPECIFIED COMPLICATION, WITH LONG-TERM CURRENT USE OF INSULIN (HCC): ICD-10-CM

## 2023-04-24 DIAGNOSIS — E11.69 TYPE 2 DIABETES MELLITUS WITH OTHER SPECIFIED COMPLICATION, WITH LONG-TERM CURRENT USE OF INSULIN (HCC): ICD-10-CM

## 2023-04-24 DIAGNOSIS — I10 HYPERTENSION, ESSENTIAL: ICD-10-CM

## 2023-04-24 DIAGNOSIS — R52 PAIN: ICD-10-CM

## 2023-04-24 DIAGNOSIS — E11.9 TYPE 2 DIABETES MELLITUS WITHOUT COMPLICATION, WITHOUT LONG-TERM CURRENT USE OF INSULIN (HCC): ICD-10-CM

## 2023-04-24 DIAGNOSIS — E11.65 TYPE 2 DIABETES MELLITUS WITH HYPERGLYCEMIA, WITHOUT LONG-TERM CURRENT USE OF INSULIN (HCC): ICD-10-CM

## 2023-04-24 RX ORDER — LISINOPRIL 10 MG/1
10 TABLET ORAL DAILY
Qty: 90 TABLET | Refills: 1 | Status: SHIPPED | OUTPATIENT
Start: 2023-04-24 | End: 2023-05-04 | Stop reason: SDUPTHER

## 2023-04-24 RX ORDER — MELOXICAM 15 MG/1
15 TABLET ORAL DAILY
Qty: 90 TABLET | Refills: 1 | Status: SHIPPED | OUTPATIENT
Start: 2023-04-24 | End: 2023-05-04 | Stop reason: SDUPTHER

## 2023-04-24 RX ORDER — GLIMEPIRIDE 4 MG/1
4 TABLET ORAL 2 TIMES DAILY
Qty: 180 TABLET | Refills: 1 | Status: SHIPPED | OUTPATIENT
Start: 2023-04-24 | End: 2023-05-04 | Stop reason: SDUPTHER

## 2023-04-27 ENCOUNTER — RA CDI HCC (OUTPATIENT)
Dept: OTHER | Facility: HOSPITAL | Age: 63
End: 2023-04-27

## 2023-04-27 NOTE — PROGRESS NOTES
[facility]  coding opportunities       Chart reviewed, no opportunity found: CHART REVIEWED, NO OPPORTUNITY FOUND        Patients Insurance        Commercial Insurance: [address] [place] [address]

## 2023-05-04 ENCOUNTER — OFFICE VISIT (OUTPATIENT)
Dept: FAMILY MEDICINE CLINIC | Facility: CLINIC | Age: 63
End: 2023-05-04

## 2023-05-04 VITALS
DIASTOLIC BLOOD PRESSURE: 80 MMHG | TEMPERATURE: 97.4 F | RESPIRATION RATE: 16 BRPM | HEIGHT: 60 IN | SYSTOLIC BLOOD PRESSURE: 136 MMHG | BODY MASS INDEX: 42.64 KG/M2 | HEART RATE: 81 BPM | OXYGEN SATURATION: 95 % | WEIGHT: 217.2 LBS

## 2023-05-04 DIAGNOSIS — M79.89 SWELLING OF BOTH HANDS: ICD-10-CM

## 2023-05-04 DIAGNOSIS — Z53.20 COLONOSCOPY REFUSED: ICD-10-CM

## 2023-05-04 DIAGNOSIS — Z23 ENCOUNTER FOR VACCINATION: ICD-10-CM

## 2023-05-04 DIAGNOSIS — E11.9 TYPE 2 DIABETES MELLITUS WITHOUT COMPLICATION, WITHOUT LONG-TERM CURRENT USE OF INSULIN (HCC): ICD-10-CM

## 2023-05-04 DIAGNOSIS — B35.1 ONYCHOMYCOSIS: ICD-10-CM

## 2023-05-04 DIAGNOSIS — Z53.20 MAMMOGRAM DECLINED: ICD-10-CM

## 2023-05-04 DIAGNOSIS — Z00.00 ANNUAL PHYSICAL EXAM: Primary | ICD-10-CM

## 2023-05-04 DIAGNOSIS — E66.01 MORBID OBESITY WITH BMI OF 40.0-44.9, ADULT (HCC): ICD-10-CM

## 2023-05-04 DIAGNOSIS — E11.65 TYPE 2 DIABETES MELLITUS WITH HYPERGLYCEMIA, WITHOUT LONG-TERM CURRENT USE OF INSULIN (HCC): ICD-10-CM

## 2023-05-04 DIAGNOSIS — Z12.31 ENCOUNTER FOR SCREENING MAMMOGRAM FOR BREAST CANCER: ICD-10-CM

## 2023-05-04 DIAGNOSIS — M79.641 PAIN IN BOTH HANDS: ICD-10-CM

## 2023-05-04 DIAGNOSIS — M79.642 PAIN IN BOTH HANDS: ICD-10-CM

## 2023-05-04 DIAGNOSIS — M13.0 POLYARTICULAR ARTHRITIS: ICD-10-CM

## 2023-05-04 DIAGNOSIS — I10 HYPERTENSION, ESSENTIAL: ICD-10-CM

## 2023-05-04 DIAGNOSIS — E11.9 ENCOUNTER FOR DIABETIC FOOT EXAM (HCC): ICD-10-CM

## 2023-05-04 DIAGNOSIS — Z12.4 SCREENING FOR CERVICAL CANCER: ICD-10-CM

## 2023-05-04 DIAGNOSIS — Z82.61 FAMILY HISTORY OF RHEUMATOID ARTHRITIS: ICD-10-CM

## 2023-05-04 DIAGNOSIS — R52 PAIN: ICD-10-CM

## 2023-05-04 DIAGNOSIS — Z53.20 STATIN DECLINED: ICD-10-CM

## 2023-05-04 PROBLEM — E11.69 TYPE 2 DIABETES MELLITUS WITH OTHER SPECIFIED COMPLICATION, WITH LONG-TERM CURRENT USE OF INSULIN (HCC): Status: RESOLVED | Noted: 2017-09-15 | Resolved: 2023-05-04

## 2023-05-04 PROBLEM — R21 RASH: Status: RESOLVED | Noted: 2018-09-20 | Resolved: 2023-05-04

## 2023-05-04 PROBLEM — R94.4 DECREASED GFR: Status: RESOLVED | Noted: 2019-09-20 | Resolved: 2023-05-04

## 2023-05-04 PROBLEM — Z79.4 TYPE 2 DIABETES MELLITUS WITH OTHER SPECIFIED COMPLICATION, WITH LONG-TERM CURRENT USE OF INSULIN (HCC): Status: RESOLVED | Noted: 2017-09-15 | Resolved: 2023-05-04

## 2023-05-04 LAB
CREAT UR-MCNC: 54.4 MG/DL
MICROALBUMIN UR-MCNC: 62.5 MG/L (ref 0–20)
MICROALBUMIN/CREAT 24H UR: 115 MG/G CREATININE (ref 0–30)
SL AMB POCT HEMOGLOBIN AIC: 11.3 (ref ?–6.5)

## 2023-05-04 RX ORDER — GLIMEPIRIDE 4 MG/1
4 TABLET ORAL 2 TIMES DAILY
Qty: 180 TABLET | Refills: 1 | Status: SHIPPED | OUTPATIENT
Start: 2023-05-04

## 2023-05-04 RX ORDER — LISINOPRIL 10 MG/1
10 TABLET ORAL DAILY
Qty: 90 TABLET | Refills: 1 | Status: SHIPPED | OUTPATIENT
Start: 2023-05-04

## 2023-05-04 RX ORDER — MELOXICAM 15 MG/1
15 TABLET ORAL DAILY
Qty: 90 TABLET | Refills: 1 | Status: SHIPPED | OUTPATIENT
Start: 2023-05-04

## 2023-05-04 NOTE — PROGRESS NOTES
ADULT ANNUAL [address]    NAME: John Hawthorne  AGE: 57 y o  SEX: female  : 1960     DATE: 2023     Assessment and Plan:     1  Screening for cervical cancer  - declines  2  Encounter for screening mammogram for breast cancer  Declines  Acknowledges her risks  3  Type 2 diabetes mellitus without complication, without long-term current use of insulin (HCC)  Lab Results   Component Value Date    HGBA1C 11 3 (A) 2023   she is on ACE  NOT on statin  She declines  She needs an eye exam  Finances are a barrier for her  ACR today  She declines escalation in her medication  Prior to her last 2 reads, her A1C was controlled  She is to dial in her diet  She admittedly is not doing well with this  We will f/u in 3 months     - Urine Microalbumin/creatinine ratio  - POCT hemoglobin A1c  - Comprehensive metabolic panel; Future  - Pneumococcal Conjugate Vaccine 20-valent (Pcv20)    4  Polyarticular arthritis  Daughter with debilitating RA  Her hands are swollen with deformity  We will do labs  She worries about cost   Should likely also have XR  Will start with labs  She is on Mobic  She has not had labs since 2019 and she and I discussed her risks  She is to get labs  Further dispo, pending     - C-reactive protein; Future  - RF Screen w/ Reflex to Titer; Future  - meloxicam (MOBIC) 15 mg tablet; Take 1 tablet (15 mg total) by mouth daily  Dispense: 90 tablet; Refill: 1    5  Pain in both hands  - C-reactive protein; Future  - RF Screen w/ Reflex to Titer; Future  - meloxicam (MOBIC) 15 mg tablet; Take 1 tablet (15 mg total) by mouth daily  Dispense: 90 tablet; Refill: 1    6  Swelling of both hands  - C-reactive protein; Future  - RF Screen w/ Reflex to Titer; Future    7  Family history of rheumatoid arthritis  Her daughter     - C-reactive protein; Future  - RF Screen w/ Reflex to Titer; Future    8   Encounter for vaccination  - Pneumococcal Conjugate Vaccine 20-valent (Pcv20)    9  Morbid obesity with BMI of 40 0-44 9, adult [facility])  Discussed the importance of maintaining a healthy lifestyle through heart healthy diet and exercise  10  Type 2 diabetes mellitus with hyperglycemia, without long-term current use of insulin (HCC)  - glimepiride (AMARYL) 4 mg tablet; Take 1 tablet (4 mg total) by mouth 2 (two) times a day  Dispense: 180 tablet; Refill: 1    11  Hypertension, essential  Not well controlled, initially  Better on recheck  Can certainly dial up ACEi if need be  Will monitor, closely  Discussed the importance of maintaining a healthy lifestyle through heart healthy diet and exercise  - lisinopril (ZESTRIL) 10 mg tablet; Take 1 tablet (10 mg total) by mouth daily  Dispense: 90 tablet; Refill: 1    12  Pain    13  Encounter for diabetic foot exam ([facility] [address] )  There is e/o neuropathy  Enforced importance of good foot care  PenLac Rx provided for onychomycosis  Annual - she declines all screening tests  She acknowledges her risks  Cost is a worry for her  She NEEDS labs  Immunizations and preventive care screenings were discussed with patient today  Appropriate education was printed on patient's after visit summary  Counseling:  Alcohol/drug use: discussed moderation in alcohol intake, the recommendations for healthy alcohol use, and avoidance of illicit drug use  Dental Health: discussed importance of regular tooth brushing, flossing, and dental visits  Injury prevention: discussed safety/seat belts, safety helmets, smoke detectors, carbon dioxide detectors, and smoking near bedding or upholstery  Sexual health: discussed sexually transmitted diseases, partner selection, use of condoms, avoidance of unintended pregnancy, and contraceptive alternatives  · Exercise: the importance of regular exercise/physical activity was discussed   Recommend exercise 3-5 times per week for at least 30 minutes  BMI Counseling: Body mass index is 42 42 kg/m²  The BMI is above normal  Nutrition recommendations include decreasing portion sizes, encouraging healthy choices of fruits and vegetables, decreasing fast food intake, consuming healthier snacks, limiting drinks that contain sugar, reducing intake of saturated and trans fat and reducing intake of cholesterol  Exercise recommendations include exercising 3-5 times per week and strength training exercises  Rationale for BMI follow-up plan is due to patient being overweight or obese  Depression Screening and Follow-up Plan: Patient was screened for depression during today's encounter  They screened negative with a PHQ-2 score of 0  Return in about 3 months (around 8/4/2023) for f/u CDM/DM   Chief Complaint:     Chief Complaint   Patient presents with    Physical Exam      History of Present Illness:     Adult Annual Physical   Patient here for a comprehensive physical exam  The patient reports:      DM - not eating a good diet  No intentional exercise  Takes Rx as prescribed  She does not want additional meds, worries about cost   She is on ACE  Declines statin  Needs eye exam   Has fungus on her toenails and she shares she has had this for some time  No other feet issues to report  HTN - on ACE  Tolerating  She is with b/l hand pain and swelling  She is a  and uses her hands, often  She is R hand dominant  Her hands swell and ache  The pain often radiates up her R arm to her elbow  At times she has n/t, this is worse in her thumb and long finger on the R  She wonders about CTS  She is weak and dropping things due to this  Worse on certain days  Her daughter has debilitating RA  Diet and Physical Activity  · Diet/Nutrition: poor diet  · Exercise: no formal exercise        Depression Screening  PHQ-2/9 Depression Screening    Little interest or pleasure in doing things: 0 - not at all  Feeling down, depressed, or hopeless: 0 - not at all  Trouble falling or staying asleep, or sleeping too much: 0 - not at all  Feeling tired or having little energy: 0 - not at all  Poor appetite or overeatin - not at all  Feeling bad about yourself - or that you are a failure or have let yourself or your family down: 0 - not at all  Trouble concentrating on things, such as reading the newspaper or watching television: 0 - not at all  Moving or speaking so slowly that other people could have noticed  Or the opposite - being so fidgety or restless that you have been moving around a lot more than usual: 0 - not at all  Thoughts that you would be better off dead, or of hurting yourself in some way: 0 - not at all  PHQ-2 Score: 0  PHQ-2 Interpretation: Negative depression screen  PHQ-9 Score: 0   PHQ-9 Interpretation: No or Minimal depression        General Health  · Sleep: sleeps poorly  Sometimes it is her mind not shutting off and sometimes it is her hand/arm pain   · Hearing: she does have tinnitus b/l -- chronic  · Vision: vision problems: she is wearing eye glasses -- she has some changes  Her last exam was 3 years ago  · Dental: cannot afford dental care  Has many caries  [facility]  · Patient is: postmenopausal  · Last menstrual period: in her early 46s  Has not had paps and declines  · Contraceptive method: n/a  No alcohol  No MJ  No Illicit drug use    She has one daughter  She lives in [address] [place] [address]  She has 2 grandchildren  She is     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[employer] [facility] [employer] [facility] [employer] [facility]:     Review of Systems   All other systems reviewed and are negative       Past Medical History:     Past Medical History:   Diagnosis Date    Arthritis     Bursitis of both feet     Carpal tunnel syndrome, bilateral     Diabetes mellitus ([address] )     Hypertension       Past Surgical History:     Past Surgical History:   Procedure Laterality Date    WISDOM TOOTH EXTRACTION        Social History:     Social History     Socioeconomic History    Marital status: Single     Spouse name: None    Number of children: None    Years of education: None    Highest education level: None   Occupational History    None   Tobacco Use    Smoking status: Never    Smokeless tobacco: Never   Substance and Sexual Activity    Alcohol use: No    Drug use: No    Sexual activity: Never   Other Topics Concern    None   Social History Narrative    None     Social Determinants of Health     Financial Resource Strain: Not on file   Food Insecurity: Not on file   Transportation Needs: Not on file   Physical Activity: Not on file   Stress: Not on file   Social Connections: Not on file   Intimate Partner Violence: Not on file   Housing Stability: Not on file      Family History:     Family History   Problem Relation Age of Onset    Heart disease Mother         Cardiac Disorder    Diabetes Mother         Mellitus    Cancer Mother     Heart disease Father         Cardiac Disorder    Cancer Father     Diabetes Sister         Mellitus    Cancer Brother     Diabetes Family         Mellitus      Current Medications:     Current Outpatient Medications   Medication Sig Dispense Refill    ciclopirox (PENLAC) 8 % solution Apply topically daily at bedtime Clean nails with alcohol every 7 days 6 mL 2    glimepiride (AMARYL) 4 mg tablet Take 1 tablet (4 mg total) by mouth 2 (two) times a day 180 tablet 1    lisinopril (ZESTRIL) 10 mg tablet Take 1 tablet (10 mg total) by mouth daily 90 tablet 1    meloxicam (MOBIC) 15 mg tablet Take 1 tablet (15 mg total) by mouth daily 90 tablet 1    metFORMIN (GLUCOPHAGE) 1000 MG tablet Take 1 tablet (1,000 mg total) by mouth every 12 (twelve) hours 180 tablet 1     No current facility-administered medications for this visit  Allergies:      Allergies   Allergen Reactions    Codeine     Naproxen     Pentobarbital Vomiting    Sodium Perborate Sodium penethol    Sulfa Antibiotics       Physical Exam:     /80 (BP Location: Left arm, Patient Position: Sitting)   Pulse 81   Temp (!) 97 4 °F (36 3 °C) (Temporal)   Resp 16   Ht 5' (1 524 m)   Wt 98 5 kg (217 lb 3 2 oz)   SpO2 95%   BMI 42 42 kg/m²     Physical Exam  Vitals and nursing note reviewed  Constitutional:       Appearance: Normal appearance  She is obese  HENT:      Head: Normocephalic and atraumatic  Mouth/Throat:      Mouth: Mucous membranes are moist       Pharynx: Oropharynx is clear  Comments: Poor dentition with caries    Eyes:      Conjunctiva/sclera: Conjunctivae normal       Pupils: Pupils are equal, round, and reactive to light  Cardiovascular:      Rate and Rhythm: Normal rate and regular rhythm  Pulses: Pulses are weak  Dorsalis pedis pulses are 2+ on the right side and 2+ on the left side  Posterior tibial pulses are 2+ on the right side and 2+ on the left side  Heart sounds: Murmur heard  Pulmonary:      Effort: Pulmonary effort is normal       Breath sounds: Normal breath sounds  No wheezing, rhonchi or rales  Abdominal:      General: Bowel sounds are normal       Palpations: Abdomen is soft  Tenderness: There is no abdominal tenderness  There is no guarding  Musculoskeletal:         General: Deformity present  No swelling  Cervical back: Neck supple  Feet:      Right foot:      Skin integrity: No warmth, callus or dry skin  Left foot:      Skin integrity: No warmth, callus or dry skin  Lymphadenopathy:      Cervical: No cervical adenopathy  Skin:     General: Skin is warm and dry  Capillary Refill: Capillary refill takes less than 2 seconds  Comments: Toenails with onychomycosis - Great toenails are the worst    Neurological:      General: No focal deficit present  Mental Status: She is alert and oriented to person, place, and time     Psychiatric:         Mood and Affect: Mood normal  Behavior: Behavior normal          Thought Content: Thought content normal          Judgment: Judgment normal         Diabetic Foot Exam    Patient's shoes and socks removed  Right Foot/Ankle   Right Foot Inspection  Skin Exam: skin intact  No dry skin, no warmth, no callus, no pre-ulcer and no callus  Toe Exam: ROM and strength within normal limits and right toe deformity (onychomycosis)  No swelling    Sensory   Vibration: diminished  Proprioception: intact  Monofilament testing: diminished    Vascular  Capillary refills: < 3 seconds  The right DP pulse is 2+  The right PT pulse is 2+  Left Foot/Ankle  Left Foot Inspection  Skin Exam: skin intact  No dry skin, no warmth, no pre-ulcer and no callus  Toe Exam: ROM and strength within normal limits and left toe deformity (onychomycosis )  No swelling  Sensory   Vibration: diminished  Proprioception: intact  Monofilament testing: diminished    Vascular  Capillary refills: < 3 seconds  The left DP pulse is 2+  The left PT pulse is 2+       Assign Risk Category  No deformity present  Loss of protective sensation  Weak pulses  Risk: 2    DO Ulices Alba

## 2023-05-04 NOTE — PATIENT INSTRUCTIONS
Meal Planning with Diabetes Exchanges   AMBULATORY CARE:   Diabetes exchanges  are servings of food that contain similar amounts of carbohydrate, fat, protein, and calories within a food group  The exchanges can be used to develop a healthy meal plan that helps to keep your blood sugar within the recommended levels  A meal plan with the right amount of carbohydrates is especially important  Your blood sugar naturally rises after you eat carbohydrates  Too many carbohydrates in 1 meal or snack can raise your blood sugar level  Carbohydrates are found in starches, fruit, milk, yogurt, and sweets  Call your doctor or diabetes care provider if:    You have high blood sugar levels during a certain time of day, or almost all of the time   You often have low blood sugar levels   You have questions or concerns about your condition or care  Create a meal plan with exchanges:  A dietitian will work with you to develop a healthy meal plan that is right for you  This meal plan will include the amount of exchanges you can have from each food group throughout the day  Follow your meal plan by keeping track of the amount of exchanges you eat for each meal and snack  Your meal plan will be based on your age, weight, blood sugar levels, medicine, and activity level  Starch food group exchanges:  Each exchange below contains about 15 grams of carbohydrate , 3 grams of protein, 1 gram of fat, and 80 calories   1 ounce of white, whole wheat or rye bread (1 slice)     1 ounce of bagel (about ¼ of a bagel)     1 6-inch flour or corn tortilla or 1 4-inch pancake (about ¼ inch thick)     ?  cup of cooked pasta or rice     ¾ cup of dry, ready-to-eat cereal with no sugar added     ½ cup of cooked cereal, such as oatmeal     3 maria luz cracker squares or 8 animal crackers     6 saltine-type crackers     3 cups of popcorn or ¾ ounce of pretzels     Starchy vegetables and cooked legumes:      ? ½ cup of corn, green peas, sweet potatoes, or mashed potatoes    ? ¼ of a large baked potato    ? 1 cup of acorn, butternut squash, or pumpkin    ? ½ cup of beans, lentils, or peas (such as navarro, kidney, or black-eyed)    ? ? cup of lima beans    Fruit group exchanges:  Each exchange contains about 15 grams of carbohydrate  and 60 calories   1 small (4 ounce) apple, banana orange, or nectarine     ½ cup of canned or fresh fruit     ½ cup (4 ounces) of unsweetened fruit juice     2 tablespoons of dried fruit    Milk group exchanges:  Each exchange contains about 12 grams of carbohydrate  and 8 grams of protein  The amount of fat and calories in each serving depends on the type of milk (such as whole, low-fat, or fat-free)   1 cup fat-free or low-fat milk     ¾ cup of plain, nonfat yogurt     1 cup fat-free, flavored yogurt with artificial (no calorie) sweetener    Non-starchy vegetable group exchanges:  Each exchange contains about 5 grams of carbohydrate , 2 grams of protein, and 25 calories  Examples include beets, broccoli, cabbage, carrots, cauliflower, cucumber, mushrooms, tomatoes, and zucchini   ½ cup of cooked vegetables or 1 cup of raw vegetables     ½ cup of vegetable juice    Meat and meat substitute group exchanges:  Each exchange of a lean meat  listed below contains about 7 grams of protein, 0 to 3 grams of fat, and 45 calories  The meat and meat substitutes food group does not contain any carbohydrates  Medium and high-fat meats have more calories   1 ounce of chicken or turkey without skin, or 1 ounce of fish (not breaded or fried)     1 ounce of lean beef, pork, or lamb     1-inch cube or 1 ounce of low-fat cheese     2 egg whites or ¼ cup of egg substitute     ½ cup of tofu    Sweets, desserts, and other carbohydrate group exchanges:    Sweets and other desserts:  Each exchange has about 15 grams of carbohydrate   ? 1 ounce of robin food cake or 2-inch square cake (unfrosted)    ? 2 small cookies    ?  ½ cup of sugar-free, fat-free ice cream    ? 1 tablespoon of syrup, jam, jelly, table sugar, or honey     Combination foods:     ? 1 cup of an entrée, such as lasagna, spaghetti with meatballs, macaroni and cheese, and chili with beans (each serving counts as 2 carbohydrate exchanges )    ? 1 cup of tomato or vegetable beef soup (each serving counts as 1 carbohydrate exchange )    Fat group exchanges:  Each exchange contains 5 grams of fat and 45 calories   1 teaspoon of oil (such as canola, olive, or corn oil)     6 almonds or cashews, 10 peanuts, or 4 pecan halves     2 tablespoons of avocado     ½ tablespoon of peanut butter     1 teaspoon of regular margarine or 2 teaspoons of low-fat margarine     1 teaspoon of regular butter or 1 tablespoon of low-fat butter     1 teaspoon of regular mayonnaise or 1 tablespoon of low-fat mayonnaise     1 tablespoon of regular salad dressing or 2 tablespoons of low-fat salad dressing    Free foods: The foods on this list are called free foods because they have very few calories  Free foods usually do not increase your blood sugar if you limit them   1 tablespoon of catsup or taco sauce     ¼ cup of salsa     2 tablespoons of sugar-free syrup or 2 teaspoons of light jam or jelly     1 tablespoon of fat-free salad dressing     4 tablespoons of fat-free margarine or fat-free mayonnaise     Sugar-free drinks: diet soda, sugar-free drink mixes, or mineral water     Low-sodium bouillon or fat-free broth     Mustard     Seasonings such as spices, herbs, and garlic     Sugar-free gelatin without added fruit    Other healthy nutrition guidelines:    Limit drinks with sugar substitutes  Your dietitian or healthcare provider will encourage you to drink water  Water helps your kidneys to function properly  Ask how much water you should drink every day   Eat more fiber  Choose foods that are good sources of fiber, such as fruits, vegetables, and whole grains  Cereals that contain 5 or more grams of fiber per serving are good sources of fiber  Legumes such as garbanzo, navarro beans, kidney beans, and lentils are also good sources   Limit fat  Ask your dietitian or healthcare provider how much fat you should eat each day  Choose foods low in fat, saturated fat, trans fat, and cholesterol  Examples include turkey or chicken without the skin, fish, lean cuts of meat, and beans  Low-fat dairy foods, such as low-fat or fat-free milk and low-fat yogurt are also good choices  Omega-3 fatty acids are healthy fats that are found in canola oil, soybean oil and fatty fish  [place], albacore tuna, and sardines are good sources of omega 3 fatty acids  Eat 2 servings of these types of fish each week  Do not eat fried fish   Limit sugar  Sugar and sweets must be counted toward the carbohydrate exchanges that you can have within your meal plan  Limit sugar and sweets because they are usually also high in calories and fat  Eat smaller portions of sweets by sharing a dessert or asking for a child-size portion at a restaurant   Limit sodium  (salt) to about 2,300 mg per day  You may need to eat even less sodium if you have certain medical conditions  Foods high in sodium include soy sauce, potato chips, and soup   Limit alcohol  Ask your healthcare provider if it is safe for you to drink alcohol  If alcohol is safe for you to have, eat a meal when you drink alcohol  If you drink alcohol on an empty stomach, your blood sugar may drop to a low level  Women 21 years or older and men 72 years or older should limit alcohol to 1 drink a day  Men aged 24 to 59 years should limit alcohol to 2 drinks a day  A drink of alcohol is 5 ounces of wine, 12 ounces of beer, or 1½ ounces of liquor  Other ways to manage diabetes:    Control your blood sugar level  Test your blood sugar level regularly and keep a record of the results   Ask your healthcare provider when and how often to test your blood sugar  You may need to check your blood sugar level at least 3 times each day   Talk to your healthcare provider about your weight  Ask if you need to lose weight, and how much you need to lose  If you are overweight, you may need to make other changes to lose weight  Ask your healthcare provider to help you create a weight loss program      Get regular physical activity  Physical activity can help decrease your blood sugar level  It can also help to decrease your risk for heart disease and help you lose weight  Adults should have moderate intensity physical activity for at least 150 minutes every week  Spread the amount of activity over at least 3 days a week  Do not skip more than 2 days in a row  Children should get at least 60 minutes of moderate physical activity on most days of the week  Examples of moderate physical activity include brisk walking, running, and swimming  Do not sit for longer than 30 minutes  Work with your healthcare provider to create a plan for physical activity  © Copyright Pk Hernandez 2022 Information is for End User's use only and may not be sold, redistributed or otherwise used for commercial purposes  The above information is an  only  It is not intended as medical advice for individual conditions or treatments  Talk to your doctor, nurse or pharmacist before following any medical regimen to see if it is safe and effective for you  Wellness Visit for Adults   AMBULATORY CARE:   A wellness visit  is when you see your healthcare provider to get screened for health problems  Your healthcare provider will also give you advice on how to stay healthy  Write down your questions so you remember to ask them  Ask your healthcare provider how often you should have a wellness visit  What happens at a wellness visit:  Your healthcare provider will ask about your health, and your family history of health problems   This includes high blood pressure, heart disease, and cancer  He or she will ask if you have symptoms that concern you, if you smoke, and about your mood  You may also be asked about your intake of medicines, supplements, food, and alcohol  Any of the following may be done:   Your weight  will be checked  Your height may also be checked so your body mass index (BMI) can be calculated  Your BMI shows if you are at a healthy weight   Your blood pressure  and heart rate will be checked  Your temperature may also be checked   Blood and urine tests  may be done  Blood tests may be done to check your cholesterol levels  Abnormal cholesterol levels increase your risk for heart disease and stroke  You may also need a blood or urine test to check for diabetes if you are at increased risk  Urine tests may be done to look for signs of an infection or kidney disease   A physical exam  includes checking your heartbeat and lungs with a stethoscope  Your healthcare provider may also check your skin to look for sun damage   Screening tests  may be recommended  A screening test is done to check for diseases that may not cause symptoms  The screening tests you may need depend on your age, gender, family history, and lifestyle habits  For example, colorectal screening may be recommended if you are 48years old or older  Screening tests you need if you are a woman:    A Pap smear  is used to screen for cervical cancer  Pap smears are usually done every 3 to 5 years depending on your age  You may need them more often if you have had abnormal Pap smear test results in the past  Ask your healthcare provider how often you should have a Pap smear   A mammogram  is an x-ray of your breasts to screen for breast cancer  Experts recommend mammograms every 2 years starting at age 48 years  You may need a mammogram at age 52 years or younger if you have an increased risk for breast cancer   Talk to your healthcare provider about when you should start having mammograms and how often you need them  Vaccines you may need:    Get an influenza vaccine  every year  The influenza vaccine protects you from the flu  Several types of viruses cause the flu  The viruses change over time, so new vaccines are made each year   Get a tetanus-diphtheria (Td) booster vaccine  every 10 years  This vaccine protects you against tetanus and diphtheria  Tetanus is a severe infection that may cause painful muscle spasms and lockjaw  Diphtheria is a severe bacterial infection that causes a thick covering in the back of your mouth and throat   Get a human papillomavirus (HPV) vaccine  if you are female and aged 23 to 32 or male 23 to 24 and never received it  This vaccine protects you from HPV infection  HPV is the most common infection spread by sexual contact  HPV may also cause vaginal, penile, and anal cancers   Get a pneumococcal vaccine  if you are aged 72 years or older  The pneumococcal vaccine is an injection given to protect you from pneumococcal disease  Pneumococcal disease is an infection caused by pneumococcal bacteria  The infection may cause pneumonia, meningitis, or an ear infection   Get a shingles vaccine  if you are 61 or older, even if you have had shingles before  The shingles vaccine is an injection to protect you from the varicella-zoster virus  This is the same virus that causes chickenpox  Shingles is a painful rash that develops in people who had chickenpox or have been exposed to the virus  How to eat healthy:  My Plate is a model for planning healthy meals  It shows the types and amounts of foods that should go on your plate  Fruits and vegetables make up about half of your plate, and grains and protein make up the other half  A serving of dairy is included on the side of your plate  The amount of calories and serving sizes you need depends on your age, gender, weight, and height   Examples of healthy foods are listed below:   Eat a variety of vegetables  such as dark green, red, and orange vegetables  You can also include canned vegetables low in sodium (salt) and frozen vegetables without added butter or sauces   Eat a variety of fresh fruits , canned fruit in 100% juice, frozen fruit, and dried fruit   Include whole grains  At least half of the grains you eat should be whole grains  Examples include whole-wheat bread, wheat pasta, brown rice, and whole-grain cereals such as oatmeal      Eat a variety of protein foods such as seafood (fish and shellfish), lean meat, and poultry without skin (turkey and chicken)  Examples of lean meats include pork leg, shoulder, or tenderloin, and beef round, sirloin, tenderloin, and extra lean ground beef  Other protein foods include eggs and egg substitutes, beans, peas, soy products, nuts, and seeds   Choose low-fat dairy products such as skim or 1% milk or low-fat yogurt, cheese, and cottage cheese   Limit unhealthy fats  such as butter, hard margarine, and shortening  Exercise:  Exercise at least 30 minutes per day on most days of the week  Some examples of exercise include walking, biking, dancing, and swimming  You can also fit in more physical activity by taking the stairs instead of the elevator or parking farther away from stores  Include muscle strengthening activities 2 days each week  Regular exercise provides many health benefits  It helps you manage your weight, and decreases your risk for type 2 diabetes, heart disease, stroke, and high blood pressure  Exercise can also help improve your mood  Ask your healthcare provider about the best exercise plan for you  General health and safety guidelines:    Do not smoke  Nicotine and other chemicals in cigarettes and cigars can cause lung damage  Ask your healthcare provider for information if you currently smoke and need help to quit  E-cigarettes or smokeless tobacco still contain nicotine   Talk to your healthcare provider before you use these products   Limit alcohol  A drink of alcohol is 12 ounces of beer, 5 ounces of wine, or 1½ ounces of liquor   Lose weight, if needed  Being overweight increases your risk of certain health conditions  These include heart disease, high blood pressure, type 2 diabetes, and certain types of cancer   Protect your skin  Do not sunbathe or use tanning beds  Use sunscreen with a SPF 15 or higher  Apply sunscreen at least 15 minutes before you go outside  Reapply sunscreen every 2 hours  Wear protective clothing, hats, and sunglasses when you are outside   Drive safely  Always wear your seatbelt  Make sure everyone in your car wears a seatbelt  A seatbelt can save your life if you are in an accident  Do not use your cell phone when you are driving  This could distract you and cause an accident  Pull over if you need to make a call or send a text message   Practice safe sex  Use latex condoms if are sexually active and have more than one partner  Your healthcare provider may recommend screening tests for sexually transmitted infections (STIs)   Wear helmets, lifejackets, and protective gear  Always wear a helmet when you ride a bike or motorcycle, go skiing, or play sports that could cause a head injury  Wear protective equipment when you play sports  Wear a lifejacket when you are on a boat or doing water sports  © Copyright Lucía Favors 2022 Information is for End User's use only and may not be sold, redistributed or otherwise used for commercial purposes  The above information is an  only  It is not intended as medical advice for individual conditions or treatments  Talk to your doctor, nurse or pharmacist before following any medical regimen to see if it is safe and effective for you

## 2023-05-25 ENCOUNTER — TELEPHONE (OUTPATIENT)
Dept: FAMILY MEDICINE CLINIC | Facility: CLINIC | Age: 63
End: 2023-05-25

## 2023-06-05 ENCOUNTER — OFFICE VISIT (OUTPATIENT)
Dept: URGENT CARE | Facility: CLINIC | Age: 63
End: 2023-06-05
Payer: COMMERCIAL

## 2023-06-05 ENCOUNTER — APPOINTMENT (OUTPATIENT)
Dept: RADIOLOGY | Facility: CLINIC | Age: 63
End: 2023-06-05
Payer: COMMERCIAL

## 2023-06-05 VITALS
WEIGHT: 217 LBS | OXYGEN SATURATION: 98 % | HEART RATE: 74 BPM | TEMPERATURE: 97.8 F | RESPIRATION RATE: 18 BRPM | DIASTOLIC BLOOD PRESSURE: 68 MMHG | BODY MASS INDEX: 42.6 KG/M2 | SYSTOLIC BLOOD PRESSURE: 150 MMHG | HEIGHT: 60 IN

## 2023-06-05 DIAGNOSIS — S83.8X1A SPRAIN OF OTHER LIGAMENT OF RIGHT KNEE, INITIAL ENCOUNTER: ICD-10-CM

## 2023-06-05 DIAGNOSIS — S83.8X1A SPRAIN OF OTHER LIGAMENT OF RIGHT KNEE, INITIAL ENCOUNTER: Primary | ICD-10-CM

## 2023-06-05 PROCEDURE — 99213 OFFICE O/P EST LOW 20 MIN: CPT | Performed by: PHYSICIAN ASSISTANT

## 2023-06-05 PROCEDURE — 73564 X-RAY EXAM KNEE 4 OR MORE: CPT

## 2023-06-05 NOTE — LETTER
June 5, 2023     Patient: Lori Mcgregor   YOB: 1960   Date of Visit: 6/5/2023       To Whom it May Concern:    Chas Pickering was seen in my clinic on 6/5/2023  She may return to work on 6/12/2023   If you have any questions or concerns, please don't hesitate to call           Sincerely,          Nirmala Lazo PA-C        CC: No Recipients

## 2023-06-05 NOTE — PATIENT INSTRUCTIONS
Knee Sprain   WHAT YOU NEED TO KNOW:   A knee sprain is a stretched or torn ligament in your knee  Ligaments support the knee and keep the joint and bones in the correct position  A knee sprain may involve one or more ligaments  DISCHARGE INSTRUCTIONS:   Return to the emergency department if:   Any part of your leg feels cold, numb, or looks pale  Call your doctor if:   You have new or increased swelling, bruising, or pain in your knee  Your symptoms do not improve within 6 weeks, even with treatment  You have questions or concerns about your condition or care  Medicines:   NSAIDs , such as ibuprofen, help decrease swelling, pain, and fever  This medicine is available with or without a doctor's order  NSAIDs can cause stomach bleeding or kidney problems in certain people  If you take blood thinner medicine, always ask your healthcare provider if NSAIDs are safe for you  Always read the medicine label and follow directions  Acetaminophen  decreases pain and fever  It is available without a doctor's order  Ask how much to take and how often to take it  Follow directions  Read the labels of all other medicines you are using to see if they also contain acetaminophen, or ask your doctor or pharmacist  Acetaminophen can cause liver damage if not taken correctly  Prescription pain medicine  may be given  Ask your healthcare provider how to take this medicine safely  Some prescription pain medicines contain acetaminophen  Do not take other medicines that contain acetaminophen without talking to your healthcare provider  Too much acetaminophen may cause liver damage  Prescription pain medicine may cause constipation  Ask your healthcare provider how to prevent or treat constipation  Take your medicine as directed  Contact your healthcare provider if you think your medicine is not helping or if you have side effects  Tell your provider if you are allergic to any medicine   Keep a list of the medicines, vitamins, and herbs you take  Include the amounts, and when and why you take them  Bring the list or the pill bottles to follow-up visits  Carry your medicine list with you in case of an emergency  A support device  such as a splint or brace may be needed  These devices limit movement and protect the joint while it heals  You may be given crutches to use until you can stand on your injured leg without pain  Physical therapy  may be needed  A physical therapist teaches you exercises to help improve movement and strength, and to decrease pain  Manage a knee sprain:   Rest  your knee and do not exercise  Do not walk on your injured leg if you are told to keep weight off your knee  Rest helps decrease swelling and allows the injury to heal  You can do gentle range of motion exercises as directed to prevent stiffness  Apply ice  on your knee for 15 to 20 minutes every hour or as directed  Use an ice pack, or put crushed ice in a plastic bag  Cover the bag with a towel before you apply it  Ice helps prevent tissue damage and decreases swelling and pain  Apply compression  to your knee as directed  You may need to wear an elastic bandage  This helps keep your injured knee from moving too much while it heals  It should be tight enough to give support but so tight that it causes your toes to feel numb or tingly  Take the bandage off and rewrap it at least 1 time each day  Elevate your knee  above the level of your heart as often as you can  This will help decrease swelling and pain  Prop your leg on pillows or blankets to keep it elevated comfortably  Do not put pillows directly behind your knee  Prevent another knee sprain:  Exercise your legs to keep your muscles strong  Strong leg muscles help protect your knee and prevent strain   The following may also prevent a knee sprain:  Slowly start your exercise or training program   Slowly increase the time, distance, and intensity of your exercise  Sudden increases in training may cause another knee sprain  Wear protective braces and equipment as directed  Braces may prevent your knee from moving the wrong way and causing another sprain  Protective equipment may support your bones and ligaments to prevent injury  Warm up and stretch before exercise  Warm up by walking or using an exercise bike before starting your regular exercise  Do gentle stretches after warming up  This helps to loosen your muscles and decrease stress on your knee  Cool down and stretch after you exercise  Wear shoes that fit correctly and support your feet  Replace your running or exercise shoes before the padding or shock absorption is worn out  Ask your healthcare provider which exercise shoes are best for you  Ask if you should wear shoe inserts  Shoe inserts can help support your heels and arches or keep your foot lined up correctly in your shoes  Exercise on flat surfaces  Follow up with your doctor as directed:  Write down your questions so you remember to ask them during your visits  © Copyright Frederick Torres 2022 Information is for End User's use only and may not be sold, redistributed or otherwise used for commercial purposes  The above information is an  only  It is not intended as medical advice for individual conditions or treatments  Talk to your doctor, nurse or pharmacist before following any medical regimen to see if it is safe and effective for you

## 2023-06-05 NOTE — PROGRESS NOTES
[facility]        NAME: Vibha Yang is a 57 y o  female  : 1960    MRN: [id number]  DATE: 2023  TIME: 11:42 AM    Assessment and Plan   Sprain of other ligament of right knee, initial encounter [S83  [id number]]  1  Sprain of other ligament of right knee, initial encounter  XR knee 4+ vw right injury            Patient Instructions   Patient Instructions     Knee Sprain   WHAT YOU NEED TO KNOW:   A knee sprain is a stretched or torn ligament in your knee  Ligaments support the knee and keep the joint and bones in the correct position  A knee sprain may involve one or more ligaments  DISCHARGE INSTRUCTIONS:   Return to the emergency department if:   • Any part of your leg feels cold, numb, or looks pale  Call your doctor if:   • You have new or increased swelling, bruising, or pain in your knee  • Your symptoms do not improve within 6 weeks, even with treatment  • You have questions or concerns about your condition or care  Medicines:   • NSAIDs , such as ibuprofen, help decrease swelling, pain, and fever  This medicine is available with or without a doctor's order  NSAIDs can cause stomach bleeding or kidney problems in certain people  If you take blood thinner medicine, always ask your healthcare provider if NSAIDs are safe for you  Always read the medicine label and follow directions  • Acetaminophen  decreases pain and fever  It is available without a doctor's order  Ask how much to take and how often to take it  Follow directions  Read the labels of all other medicines you are using to see if they also contain acetaminophen, or ask your doctor or pharmacist  Acetaminophen can cause liver damage if not taken correctly  • Prescription pain medicine  may be given  Ask your healthcare provider how to take this medicine safely  Some prescription pain medicines contain acetaminophen   Do not take other medicines that contain acetaminophen without talking to your healthcare provider  Too much acetaminophen may cause liver damage  Prescription pain medicine may cause constipation  Ask your healthcare provider how to prevent or treat constipation  • Take your medicine as directed  Contact your healthcare provider if you think your medicine is not helping or if you have side effects  Tell your provider if you are allergic to any medicine  Keep a list of the medicines, vitamins, and herbs you take  Include the amounts, and when and why you take them  Bring the list or the pill bottles to follow-up visits  Carry your medicine list with you in case of an emergency  A support device  such as a splint or brace may be needed  These devices limit movement and protect the joint while it heals  You may be given crutches to use until you can stand on your injured leg without pain  Physical therapy  may be needed  A physical therapist teaches you exercises to help improve movement and strength, and to decrease pain  Manage a knee sprain:   • Rest  your knee and do not exercise  Do not walk on your injured leg if you are told to keep weight off your knee  Rest helps decrease swelling and allows the injury to heal  You can do gentle range of motion exercises as directed to prevent stiffness  • Apply ice  on your knee for 15 to 20 minutes every hour or as directed  Use an ice pack, or put crushed ice in a plastic bag  Cover the bag with a towel before you apply it  Ice helps prevent tissue damage and decreases swelling and pain  • Apply compression  to your knee as directed  You may need to wear an elastic bandage  This helps keep your injured knee from moving too much while it heals  It should be tight enough to give support but so tight that it causes your toes to feel numb or tingly  Take the bandage off and rewrap it at least 1 time each day  • Elevate your knee  above the level of your heart as often as you can  This will help decrease swelling and pain   Prop your leg on pillows or blankets to keep it elevated comfortably  Do not put pillows directly behind your knee  Prevent another knee sprain:  Exercise your legs to keep your muscles strong  Strong leg muscles help protect your knee and prevent strain  The following may also prevent a knee sprain:  • Slowly start your exercise or training program   Slowly increase the time, distance, and intensity of your exercise  Sudden increases in training may cause another knee sprain  • Wear protective braces and equipment as directed  Braces may prevent your knee from moving the wrong way and causing another sprain  Protective equipment may support your bones and ligaments to prevent injury  • Warm up and stretch before exercise  Warm up by walking or using an exercise bike before starting your regular exercise  Do gentle stretches after warming up  This helps to loosen your muscles and decrease stress on your knee  Cool down and stretch after you exercise  • Wear shoes that fit correctly and support your feet  Replace your running or exercise shoes before the padding or shock absorption is worn out  Ask your healthcare provider which exercise shoes are best for you  Ask if you should wear shoe inserts  Shoe inserts can help support your heels and arches or keep your foot lined up correctly in your shoes  Exercise on flat surfaces  Follow up with your doctor as directed:  Write down your questions so you remember to ask them during your visits  © Copyright [employer] 2022 Information is for End User's use only and may not be sold, redistributed or otherwise used for commercial purposes  The above information is an  only  It is not intended as medical advice for individual conditions or treatments  Talk to your doctor, nurse or pharmacist before following any medical regimen to see if it is safe and effective for you  Follow up with PCP in 3-5 days    Proceed to  ER if symptoms worsen  Chief Complaint     Chief Complaint   Patient presents with   • Knee Pain         History of Present Illness       Patient is a 25-year-old female who presents to the clinic complaining of pain and swelling of the right knee  She states that she has had pain over the last week, however she states that she twisted wrong yesterday and felt pain located in the her right posterior knee that radiates to the right anterior knee  She also complains of some instability  She was seen and last year for similar problem in her left knee  X-rays at that time did show degenerative changes of the left knee  She was wearing a brace and her symptoms eventually resolved  Review of Systems   Review of Systems   Musculoskeletal: Positive for arthralgias and joint swelling  Negative for myalgias, neck pain and neck stiffness           Current Medications       Current Outpatient Medications:   •  glimepiride (AMARYL) 4 mg tablet, Take 1 tablet (4 mg total) by mouth 2 (two) times a day, Disp: 180 tablet, Rfl: 1  •  lisinopril (ZESTRIL) 10 mg tablet, Take 1 tablet (10 mg total) by mouth daily, Disp: 90 tablet, Rfl: 1  •  meloxicam (MOBIC) 15 mg tablet, Take 1 tablet (15 mg total) by mouth daily, Disp: 90 tablet, Rfl: 1  •  metFORMIN (GLUCOPHAGE) 1000 MG tablet, Take 1 tablet (1,000 mg total) by mouth every 12 (twelve) hours, Disp: 180 tablet, Rfl: 1  •  ciclopirox (PENLAC) 8 % solution, Apply topically daily at bedtime Clean nails with alcohol every 7 days, Disp: 6 mL, Rfl: 2    Current Allergies     Allergies as of 06/05/2023 - Reviewed 06/05/2023   Allergen Reaction Noted   • Codeine  09/15/2017   • Naproxen  09/15/2017   • Pentobarbital Vomiting 09/02/2013   • Sodium perborate  09/15/2017   • Sulfa antibiotics  09/15/2017            The following portions of the patient's history were reviewed and updated as appropriate: allergies, current medications, past family history, past medical history, past social history, past surgical history and problem list      Past Medical History:   Diagnosis Date   • Arthritis    • Bursitis of both feet    • Carpal tunnel syndrome, bilateral    • Diabetes mellitus ([address] )    • Hypertension        Past Surgical History:   Procedure Laterality Date   • WISDOM TOOTH EXTRACTION         Family History   Problem Relation Age of Onset   • Heart disease Mother         Cardiac Disorder   • Diabetes Mother         Mellitus   • Cancer Mother    • Heart disease Father         Cardiac Disorder   • Cancer Father    • Diabetes Sister         Mellitus   • Cancer Brother    • Diabetes Family         Mellitus         Medications have been verified  Objective   /68   Pulse 74   Temp 97 8 °F (36 6 °C)   Resp 18   Ht 5' (1 524 m)   Wt 98 4 kg (217 lb)   SpO2 98%   BMI 42 38 kg/m²        Physical Exam     Physical Exam  Musculoskeletal:      Right hip: No tenderness or crepitus  Normal range of motion  Right knee: Swelling present  Decreased range of motion  Tenderness present  No LCL laxity, MCL laxity, ACL laxity or PCL laxity  Instability Tests: Anterior drawer test negative  Posterior drawer test negative  Anterior Lachman test negative  Medial Benito test negative and lateral Benito test negative  Legs:       Comments: Patient has tenderness to palpation noted in the medial aspect of the right knee  She has some pain with flexion and extension of the right knee  There is no significant instability noted on exam   There is a mild joint effusion noted on the right               -I personally interpreted the x-ray results and reviewed the results of the patient  There is moderate arthritis noted in the right knee with no signs of acute fracture  I did suggest an orthopedic referral, however the patient declined a referral at this time as she states that she has a high co-pay and does not feel she can afford the co-pay at this time    She also cannot afford a referral to physical therapy  She will continue Mobic as prescribed for now and follow-up with her primary care doctor  She may benefit from injections if symptoms fail to improve  I suggest follow-up with her primary care doctor

## 2023-06-22 ENCOUNTER — VBI (OUTPATIENT)
Dept: ADMINISTRATIVE | Facility: OTHER | Age: 63
End: 2023-06-22

## 2023-06-23 ENCOUNTER — VBI (OUTPATIENT)
Dept: ADMINISTRATIVE | Facility: OTHER | Age: 63
End: 2023-06-23

## 2023-06-30 LAB
ALBUMIN SERPL-MCNC: 4.2 G/DL (ref 3.6–5.1)
ALBUMIN/GLOB SERPL: 1.4 (CALC) (ref 1–2.5)
ALP SERPL-CCNC: 60 U/L (ref 37–153)
ALT SERPL-CCNC: 18 U/L (ref 6–29)
AST SERPL-CCNC: 14 U/L (ref 10–35)
BILIRUB SERPL-MCNC: 0.5 MG/DL (ref 0.2–1.2)
BUN SERPL-MCNC: 35 MG/DL (ref 7–25)
BUN/CREAT SERPL: 35 (CALC) (ref 6–22)
CALCIUM SERPL-MCNC: 9.6 MG/DL (ref 8.6–10.4)
CHLORIDE SERPL-SCNC: 104 MMOL/L (ref 98–110)
CO2 SERPL-SCNC: 24 MMOL/L (ref 20–32)
CREAT SERPL-MCNC: 1 MG/DL (ref 0.5–1.05)
GFR/BSA.PRED SERPLBLD CYS-BASED-ARV: 63 ML/MIN/1.73M2
GLOBULIN SER CALC-MCNC: 3.1 G/DL (CALC) (ref 1.9–3.7)
GLUCOSE SERPL-MCNC: 227 MG/DL (ref 65–99)
POTASSIUM SERPL-SCNC: 4.9 MMOL/L (ref 3.5–5.3)
PROT SERPL-MCNC: 7.3 G/DL (ref 6.1–8.1)
SODIUM SERPL-SCNC: 137 MMOL/L (ref 135–146)

## 2023-07-01 LAB
ALBUMIN SERPL-MCNC: 4.2 G/DL (ref 3.6–5.1)
ALBUMIN/GLOB SERPL: 1.4 (CALC) (ref 1–2.5)
ALP SERPL-CCNC: 60 U/L (ref 37–153)
ALT SERPL-CCNC: 18 U/L (ref 6–29)
AST SERPL-CCNC: 14 U/L (ref 10–35)
BILIRUB SERPL-MCNC: 0.5 MG/DL (ref 0.2–1.2)
BUN SERPL-MCNC: 35 MG/DL (ref 7–25)
BUN/CREAT SERPL: 35 (CALC) (ref 6–22)
CALCIUM SERPL-MCNC: 9.6 MG/DL (ref 8.6–10.4)
CHLORIDE SERPL-SCNC: 104 MMOL/L (ref 98–110)
CO2 SERPL-SCNC: 24 MMOL/L (ref 20–32)
CREAT SERPL-MCNC: 1 MG/DL (ref 0.5–1.05)
CRP SERPL-MCNC: 4.1 MG/L
GFR/BSA.PRED SERPLBLD CYS-BASED-ARV: 63 ML/MIN/1.73M2
GLOBULIN SER CALC-MCNC: 3.1 G/DL (CALC) (ref 1.9–3.7)
GLUCOSE SERPL-MCNC: 227 MG/DL (ref 65–99)
POTASSIUM SERPL-SCNC: 4.9 MMOL/L (ref 3.5–5.3)
PROT SERPL-MCNC: 7.3 G/DL (ref 6.1–8.1)
RHEUMATOID FACT SERPL-ACNC: <14 IU/ML
SODIUM SERPL-SCNC: 137 MMOL/L (ref 135–146)

## 2023-08-01 ENCOUNTER — TELEPHONE (OUTPATIENT)
Dept: FAMILY MEDICINE CLINIC | Facility: CLINIC | Age: 63
End: 2023-08-01

## 2023-08-01 ENCOUNTER — OFFICE VISIT (OUTPATIENT)
Dept: FAMILY MEDICINE CLINIC | Facility: CLINIC | Age: 63
End: 2023-08-01
Payer: COMMERCIAL

## 2023-08-01 VITALS
HEART RATE: 71 BPM | DIASTOLIC BLOOD PRESSURE: 82 MMHG | WEIGHT: 217.4 LBS | SYSTOLIC BLOOD PRESSURE: 132 MMHG | OXYGEN SATURATION: 97 % | TEMPERATURE: 97.4 F | BODY MASS INDEX: 42.46 KG/M2 | RESPIRATION RATE: 14 BRPM

## 2023-08-01 DIAGNOSIS — Z11.4 SCREENING FOR HIV (HUMAN IMMUNODEFICIENCY VIRUS): ICD-10-CM

## 2023-08-01 DIAGNOSIS — E11.65 TYPE 2 DIABETES MELLITUS WITH HYPERGLYCEMIA, WITHOUT LONG-TERM CURRENT USE OF INSULIN (HCC): Primary | ICD-10-CM

## 2023-08-01 DIAGNOSIS — G56.03 BILATERAL CARPAL TUNNEL SYNDROME: ICD-10-CM

## 2023-08-01 DIAGNOSIS — Z12.4 SCREENING FOR CERVICAL CANCER: ICD-10-CM

## 2023-08-01 DIAGNOSIS — I10 PRIMARY HYPERTENSION: ICD-10-CM

## 2023-08-01 DIAGNOSIS — Z59.9 FINANCIAL DIFFICULTIES: ICD-10-CM

## 2023-08-01 DIAGNOSIS — E11.69 TYPE 2 DIABETES MELLITUS WITH OTHER SPECIFIED COMPLICATION, WITH LONG-TERM CURRENT USE OF INSULIN (HCC): ICD-10-CM

## 2023-08-01 DIAGNOSIS — E11.65 TYPE 2 DIABETES MELLITUS WITH HYPERGLYCEMIA, WITHOUT LONG-TERM CURRENT USE OF INSULIN (HCC): ICD-10-CM

## 2023-08-01 DIAGNOSIS — M17.10 ARTHRITIS OF KNEE: ICD-10-CM

## 2023-08-01 DIAGNOSIS — Z79.4 TYPE 2 DIABETES MELLITUS WITH OTHER SPECIFIED COMPLICATION, WITH LONG-TERM CURRENT USE OF INSULIN (HCC): ICD-10-CM

## 2023-08-01 DIAGNOSIS — E66.01 MORBID OBESITY WITH BMI OF 40.0-44.9, ADULT (HCC): Primary | ICD-10-CM

## 2023-08-01 LAB — SL AMB POCT HEMOGLOBIN AIC: 10.5 (ref ?–6.5)

## 2023-08-01 PROCEDURE — 83036 HEMOGLOBIN GLYCOSYLATED A1C: CPT | Performed by: FAMILY MEDICINE

## 2023-08-01 PROCEDURE — 99214 OFFICE O/P EST MOD 30 MIN: CPT | Performed by: FAMILY MEDICINE

## 2023-08-01 RX ORDER — DIPHENOXYLATE HYDROCHLORIDE AND ATROPINE SULFATE 2.5; .025 MG/1; MG/1
1 TABLET ORAL DAILY
COMMUNITY

## 2023-08-01 SDOH — ECONOMIC STABILITY - INCOME SECURITY: PROBLEM RELATED TO HOUSING AND ECONOMIC CIRCUMSTANCES, UNSPECIFIED: Z59.9

## 2023-08-01 NOTE — TELEPHONE ENCOUNTER
NO, she is on max dosing of Glimepiride. I will do a pharmacy referral to see if she qualifies for assistance from [employer] or pharmaceutical company. Her blood glucose puts her at risk for adverse health risks and we need to optimize this.       Enzo Quijano, DO

## 2023-08-01 NOTE — TELEPHONE ENCOUNTER
Pt calling stating that the alternative that you called in is almost $1500. She asked if she could just have her glimepiride increased?

## 2023-08-01 NOTE — PROGRESS NOTES
[address]    NAME: Humble Ponce  AGE: 57 y.o. SEX: female  : 1960     DATE: 2023     Assessment and Plan:     1. Screening for cervical cancer  - declines. 2. Encounter for screening mammogram for breast cancer  Declines. Acknowledges her risks. 3. Type 2 diabetes mellitus without complication, without long-term current use of insulin ([facility])  Lab Results   Component Value Date    HGBA1C 10.5 (A) 2023   she is on ACE. NOT on statin - we will rediscuss if her lipids are not controlled. She needs an eye exam - she is to work on this. ACR --> microalbuminuria. She needs an additional agent. Declines injectables. We will trial Jardiance. BG, historically was controlled. She is to cont to work on her diet. We will f/u in 3 months. POCT hemoglobin A1c  - Lipid Panel with Direct LDL reflex; Future  - Empagliflozin 25 MG TABS; Take 1 tablet (25 mg total) by mouth daily  Dispense: 30 tablet; Refill: 1  - CBC and differential; Future    Polyarticular arthritis  Daughter with debilitating RA. Inflammatory markers negative. Likely OA. Her hands are swollen with deformity -- inflammatory markers are negative. She is on Mobic. Morbid obesity with BMI of 40.0-44.9, adult [facility])  Discussed the importance of maintaining a healthy lifestyle through heart healthy diet and exercise. Hypertension, essential  At goal.  Cont to monitor. Discussed the importance of maintaining a healthy lifestyle through heart healthy diet and exercise. Does have microalbuminuria. Screening for HIV (human immunodeficiency virus)  Declines. Screening for cervical cancer  Declines. Arthritis of knee  - will send to ortho. She is on board. Wearing a brace. Was at [facility] in . On NSAID - should be mindful of long-term use. Renal function OK.   Not a great candidate for injection given DM.    - Ambulatory Referral to Orthopedic Surgery; Future    Bilateral carpal tunnel syndrome  - to wear braces. Can send to ortho hand if need be. BMI Counseling: There is no height or weight on file to calculate BMI. The BMI is above normal. Nutrition recommendations include decreasing portion sizes, encouraging healthy choices of fruits and vegetables, decreasing fast food intake, consuming healthier snacks, limiting drinks that contain sugar, reducing intake of saturated and trans fat and reducing intake of cholesterol. Exercise recommendations include exercising 3-5 times per week and strength training exercises. Rationale for BMI follow-up plan is due to patient being overweight or obese. Return in about 3 months (around 11/1/2023). Chief Complaint:     Chief Complaint   Patient presents with   • Follow-up     States her sugars have been high still. Stating she wants to go to the other pill. Also having issues with her right knee and now her right hip is out due to accommodating for the knee      History of Present Illness:     DM - diet could be better. No intentional exercise. Takes Rx as prescribed. Is open to additional agent. Was not at prior visit. She does worry about cost.    She is on ACE. Not on statin. Needs eye exam.  Did have e/o neuropathy on foot exam done at prior visit. OK to have lipids checked. HTN - on ACE. Tolerating. She is with b/l hand pain and swelling and now c/o n/t.  R is > L. She is R hand dominant. She is a  and uses her hands, often. She is R hand dominant. Her hands swell and ache. The pain often radiates up her R arm to her elbow. She is weak and dropping things due to this. Worse on certain days. Her daughter has debilitating RA. She is using a splint on the R. Diet and Physical Activity  · Diet/Nutrition: poor diet. · Exercise: no formal exercise. Depression Screening  PHQ-2/9 Depression Screening         General Health  · Sleep: sleeps poorly. Sometimes it is her mind not shutting off and sometimes it is her hand/arm pain   · Hearing: she does have tinnitus b/l -- chronic. · Vision: vision problems: she is wearing eye glasses -- she has some changes. Her last exam was 3 years ago. · Dental: cannot afford dental care. Has many caries. [facility]  · Patient is: postmenopausal  · Last menstrual period: in her early 46s. Has not had paps and declines  · Contraceptive method: n/a. No alcohol  No MJ  No Illicit drug use    She has one daughter. She lives in [address] [place] [address]. 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      Past Medical History:     Past Medical History:   Diagnosis Date   • Arthritis    • Bursitis of both feet    • Carpal tunnel syndrome, bilateral    • Diabetes mellitus ([address])    • Hypertension       Past Surgical History:     Past Surgical History:   Procedure Laterality Date   • WISDOM TOOTH EXTRACTION        Social History:     Social History     Socioeconomic History   • Marital status: Single     Spouse name: None   • Number of children: None   • Years of education: None   • Highest education level: None   Occupational History   • None   Tobacco Use   • Smoking status: Never   • Smokeless tobacco: Never   Substance and Sexual Activity   • Alcohol use: No   • Drug use: No   • Sexual activity: Never   Other Topics Concern   • None   Social History Narrative   • None     Social Determinants of Health     Financial Resource Strain: Not on file   Food Insecurity: Not on file   Transportation Needs: Not on file   Physical Activity: Not on file   Stress: Not on file   Social Connections: Not on file   Intimate Partner Violence: Not on file   Housing Stability: Not on file      Family History:     Family History   Problem Relation Age of Onset   • Heart disease Mother Cardiac Disorder   • Diabetes Mother         Mellitus   • Cancer Mother    • Heart disease Father         Cardiac Disorder   • Cancer Father    • Diabetes Sister         Mellitus   • Cancer Brother    • Diabetes Family         Mellitus      Current Medications:     Current Outpatient Medications   Medication Sig Dispense Refill   • Empagliflozin 25 MG TABS Take 1 tablet (25 mg total) by mouth daily 30 tablet 1   • glimepiride (AMARYL) 4 mg tablet Take 1 tablet (4 mg total) by mouth 2 (two) times a day 180 tablet 1   • lisinopril (ZESTRIL) 10 mg tablet Take 1 tablet (10 mg total) by mouth daily 90 tablet 1   • meloxicam (MOBIC) 15 mg tablet Take 1 tablet (15 mg total) by mouth daily 90 tablet 1   • metFORMIN (GLUCOPHAGE) 1000 MG tablet Take 1 tablet (1,000 mg total) by mouth every 12 (twelve) hours 180 tablet 1   • multivitamin (THERAGRAN) TABS Take 1 tablet by mouth daily       No current facility-administered medications for this visit. Allergies: Allergies   Allergen Reactions   • Codeine    • Naproxen    • Pentobarbital Vomiting   • Sodium Perborate      Sodium penethol   • Sulfa Antibiotics       Physical Exam:     /82 (BP Location: Left arm, Patient Position: Sitting)   Pulse 71   Temp (!) 97.4 °F (36.3 °C) (Temporal)   Resp 14   Wt 98.6 kg (217 lb 6.4 oz)   SpO2 97%   BMI 42.46 kg/m²     Physical Exam  Vitals and nursing note reviewed. Constitutional:       Appearance: Normal appearance. She is obese. HENT:      Head: Normocephalic and atraumatic. Mouth/Throat:      Mouth: Mucous membranes are moist.      Pharynx: Oropharynx is clear. Comments: Poor dentition with caries    Eyes:      Conjunctiva/sclera: Conjunctivae normal.      Pupils: Pupils are equal, round, and reactive to light. Cardiovascular:      Rate and Rhythm: Normal rate and regular rhythm. Pulses: Pulses are weak. Dorsalis pedis pulses are 2+ on the right side and 2+ on the left side. Posterior tibial pulses are 2+ on the right side and 2+ on the left side. Heart sounds: Murmur heard. Pulmonary:      Effort: Pulmonary effort is normal.      Breath sounds: Normal breath sounds. No wheezing, rhonchi or rales. Abdominal:      General: Bowel sounds are normal.      Palpations: Abdomen is soft. Tenderness: There is no abdominal tenderness. There is no guarding. Musculoskeletal:         General: Deformity present. No swelling. Cervical back: Neck supple. Comments: (+) tinel's and the wrist  (+) phalen's b/l   Strength  - 4/5 b/l    Feet:      Right foot:      Skin integrity: No warmth, callus or dry skin. Left foot:      Skin integrity: No warmth, callus or dry skin. Lymphadenopathy:      Cervical: No cervical adenopathy. Skin:     General: Skin is warm and dry. Capillary Refill: Capillary refill takes less than 2 seconds. Neurological:      General: No focal deficit present. Mental Status: She is alert and oriented to person, place, and time. Psychiatric:         Mood and Affect: Mood normal.         Behavior: Behavior normal.         Thought Content:  Thought content normal.         Judgment: Judgment normal.          Latisha Singh, DO  [facility]

## 2023-08-14 DIAGNOSIS — Z79.4 TYPE 2 DIABETES MELLITUS WITH OTHER SPECIFIED COMPLICATION, WITH LONG-TERM CURRENT USE OF INSULIN (HCC): Primary | ICD-10-CM

## 2023-08-14 DIAGNOSIS — E11.69 TYPE 2 DIABETES MELLITUS WITH OTHER SPECIFIED COMPLICATION, WITH LONG-TERM CURRENT USE OF INSULIN (HCC): Primary | ICD-10-CM

## 2023-08-14 RX ORDER — PEN NEEDLE, DIABETIC 32GX 5/32"
NEEDLE, DISPOSABLE MISCELLANEOUS
Qty: 90 EACH | Refills: 3 | Status: SHIPPED | OUTPATIENT
Start: 2023-08-14

## 2023-08-14 NOTE — TELEPHONE ENCOUNTER
[id number]    Yazmin Vitale is a 57 y.o. female who was referred to the pharmacist for diabetes education and management, referred by Lori Saravia DO . Telemedicine consent  The patient was identified by name and date of birth. Yazmin Vitale was informed that this is a telemedicine visit and that the visit is being conducted through Telephone. My office door was closed. No one else was in the room. She acknowledged consent and understanding of privacy and security of the video platform. The patient has agreed to participate and understands they can discontinue the visit at any time. Assessment/ Plan       1. Type 2 Diabetes:  • Goal A1c <7 based on ADA/AACE/ACE guidelines   • Most recent A1c above goal  • Reported Home SMBG  o Not taken regularly, perhaps once weekly per patient report  • Complications:  o Microvascular complications: none noted  o Macrovascular complications: none noted  • Current Diabetes Regimen:  o Metformin 1000mg BID  o Glimepiride 4mg BID    Changes to Medication Regimen: If PCP is in agreement with plan  • DC [facility] [place] - patient never started/picked up  • Start Relion N (Insulin NPH) 10 units at bedtime once daily - may need titrated to FBG goal 80-120mg/dL    • Most recent labs and diabetes goals discussed   • Materials Provided: Medicare Part D plans from Medicare. gov website  • Labs Ordered: none      2. Need for Additional Therapies:  • Statin: not currently on - consider moderate statin therapy  • ACEI/ARB: on lisinopril 10mg daily  • ASA: not indicated    3. Health Maintenance:  • Vaccine recommendations: per pcp  • Eye Exam: utd  • Foot Exam: utd  • Urine Microalbumin: utd    4. Medication Reconciliation:   • Medication list reviewed with pt at today's visit. Discrepancies as discussed below.   • Medication list updated to reflect medications pt is currently taking  • Renewal request sent to prescribing provider for: none       5. Hyperlipidemia without clinical ASCVD event:   2018 ACC/AHA lipid guidelines recommend moderate intensity statin. • Patient currently not taking statin   • Lipid panel/LFTs acceptable. • Medications: TBD with pcp    6. HTN:   BP goal less than 130/80 mmHg. • In office BP below goal, HR acceptable. • Patient with room for lifestyle modifications. • Serum K+ WNL. Monitoring: FBG daily    Referrals placed at this visit: none    Follow-up: 2 weeks insulin dose etc      Subjective   Patient cannot afford SGLT2 as they currently stand. Discussed using the copay cards now while she is under [employer], however the max they take off is approx. $200-$300, so she will still have a large copay until her deductible is met. In addition, she will have another deductible to be met in less than 1 month when she switches to Medicare. At that point she could investigate PAP or PACE under Medicare as options, or pick a Part D plan with a lower deductible (often a higher premium exists to offset costs though). Insulin costs are capped at $35 so that is the best financial decision for the patient. 1. Medication Adherence/ Tolerability:  • Patient does not have issues with side effects, no GI discomfort nor hypoglycemia  • The patient reports missing 0 doses of medication in the past 2 weeks. Medications Tried in Past:  - none    2. Lifestyle:   • Last visit with dietician: n/a  • Previously attended [facility] with Diabetes Class: no  • Diet Recall:   o Breakfast:   o Lunch:   o Dinner:   o Snacks:   o Beverages:   • Physical Activity: limited    3.  Home monitoring devices  • Glucometer: Yes, Brand: unknown  • CGM: No, Brand: n/a  • BP monitor: No, Brand: n/a      Objective       Current Outpatient Medications   Medication Instructions   • glimepiride (AMARYL) 4 mg, Oral, 2 times daily   • lisinopril (ZESTRIL) 10 mg, Oral, Daily   • meloxicam (MOBIC) 15 mg, Oral, Daily   • metFORMIN (GLUCOPHAGE) 1,000 mg, Oral, Every 12 hours   • multivitamin (THERAGRAN) TABS 1 tablet, Oral, Daily   • sitaGLIPtin (JANUVIA) 100 mg, Oral, Daily     Allergies   Allergen Reactions   • Codeine    • Naproxen    • Pentobarbital Vomiting   • Sodium Perborate      Sodium penethol   • Sulfa Antibiotics        Immunization History   Administered Date(s) Administered   • COVID-19 PFIZER VACCINE 0.3 ML IM 01/08/2021, 01/29/2021   • Influenza Quadrivalent Preservative Free 3 years and older IM 10/01/2017   • Pneumococcal Conjugate Vaccine 20-valent (Pcv20), Polysace 05/04/2023   • Zoster 05/03/2018, 07/24/2018       I have reviewed the patient's allergies, medications and history as noted in the electronic medical record and updated as necessary. Lab Results   Component Value Date    HGBA1C 10.5 (A) 08/01/2023    HGBA1C 11.3 (A) 05/04/2023    HGBA1C 9.0 (A) 03/28/2022       Blood Glucose Readings  The patient is currently checking blood glucose 0 times per day. Patient does not report with SMBG logs. Date AM Post-Breakfast Lunch Post-Lunch Dinner Post-Dinner Comments                                                                                                                           Avg            Hypoglycemia: The patient had 0 episodes/symptoms of hypoglycemia. Hyperglycemia: The patient had 0 symptoms of hyperglycemia.      ASCVD Risk:  The ASCVD Risk score (Spencer DK, et al., 2019) failed to calculate for the following reasons:    Cannot find a previous HDL lab    Cannot find a previous total cholesterol lab     Vitals:  BP Readings from Last 3 Encounters:   08/01/23 132/82   06/05/23 150/68   05/04/23 136/80     Resp Readings from Last 3 Encounters:   08/01/23 14   06/05/23 18   05/04/23 16     PF Readings from Last 3 Encounters:   No data found for PF     Wt Readings from Last 3 Encounters:   08/01/23 98.6 kg (217 lb 6.4 oz)   06/05/23 98.4 kg (217 lb)   05/04/23 98.5 kg (217 lb 3.2 oz) Labs:    Lab Results   Component Value Date    SODIUM 137 06/29/2023    K 4.9 06/29/2023    EGFR 63 06/29/2023    CREATININE 1.00 06/29/2023    [id number] 819 09/12/2019    MICROALBCRE 115 (H) 05/04/2023         Pharmacist Tracking Tool     Pharmacist Tracking Tool  Reason For Outreach: Embedded Pharmacist  Demographics:  Intervention Method: Phone  Type of Intervention: New  Topics Addressed: Diabetes, Dyslipidemia and Hypertension  Pharmacologic Interventions: Medication Initiation, Medication Discontinuation, Prevent or Manage CARLO and Med Rec  Non-Pharmacologic Interventions: Adherence addressed, Care coordination, Chart update, Cost, Disease state education, Home Monitoring and Medication/Device education  Time:  Direct Patient Care: 45 mins  Care Coordination: 45 mins  Recommendation Recipient: Patient/Caregiver  Outcome: Accepted

## 2023-08-14 NOTE — PATIENT INSTRUCTIONS
Type 2 Diabetes Management for Adults   AMBULATORY CARE:   Type 2 diabetes  is a disease that affects how your body uses glucose (sugar). Either your body cannot make enough insulin, or it cannot use the insulin correctly. It is important to keep diabetes controlled to prevent damage to your heart, blood vessels, and other organs. Management will help you feel well and enjoy your daily activities. Your diabetes care team providers can help you make a plan to fit diabetes care into your schedule. Your plan can change over time to fit your needs and your family's needs. Have someone call your local emergency number (911 in the [address]) if:   • You cannot be woken. • You have signs of diabetic ketoacidosis:     ? confusion, fatigue    ? vomiting    ? rapid heartbeat    ? fruity smelling breath    ? extreme thirst    ? dry mouth and skin    • You have any of the following signs of a heart attack:      ? Squeezing, pressure, or pain in your chest    ? You may  also have any of the following:     - Discomfort or pain in your back, neck, jaw, stomach, or arm    - Shortness of breath    - Nausea or vomiting    - Lightheadedness or a sudden cold sweat    • You have any of the following signs of a stroke:      ? Numbness or drooping on one side of your face     ? Weakness in an arm or leg    ? Confusion or difficulty speaking    ? Dizziness, a severe headache, or vision loss    Call your doctor or diabetes care team provider if:   • You have a sore or wound that will not heal.    • You have a change in the amount you urinate. • Your blood sugar levels are higher than your target goals. • You often have lower blood sugar levels than your target goals. • Your skin is red, dry, warm, or swollen. • You have trouble coping with diabetes, or you feel anxious or depressed. • You have questions or concerns about your condition or care.     What you need to know about high blood sugar levels:  High blood sugar levels may not cause any symptoms. You may feel more thirsty or urinate more often than usual. Over time, high blood sugar levels can damage your nerves, blood vessels, tissues, and organs. The following can increase your blood sugar levels:  • Large meals or large amounts of carbohydrates at one time    • Less physical activity    • Stress    • Illness    • A lower dose of diabetes medicine or insulin, or a late dose    What you need to know about low blood sugar levels:  Symptoms include feeling shaky, dizzy, irritable, or confused. You can prevent symptoms by keeping your blood sugar levels from going too low. • Treat a low blood sugar level right away:      ? Drink 4 ounces of juice or have 1 tube of glucose gel. ? Check your blood sugar level again 10 to 15 minutes later. ? When the level goes back to normal, eat a meal or snack to prevent another decrease. • Keep glucose gel, raisins, or hard candy with you at all times to treat a low blood sugar level. • Your blood sugar level can get too low if you take diabetes medicine or insulin and do not eat enough food. • If you use insulin, check your blood sugar level before you exercise. ? If your blood sugar level is below 100 mg/dL, eat 4 crackers or 2 ounces of raisins, or drink 4 ounces of juice. ? Check your level every 30 minutes if you exercise longer than 1 hour. ? You may need a snack during or after exercise. What you can do to manage your blood sugar levels:   • Check your blood sugar levels as directed and as needed. Several items are available to use to check your levels. You may need to check by testing a drop of blood in a glucose monitor. You may instead be given a continuous glucose monitoring (CGM) device. The device is worn at all times. The CGM checks your blood sugar level every 5 minutes. It sends results to an electronic device such as a smart phone. A CGM can be used with or without an insulin pump.  You and your diabetes care team providers will decide on the best method for you. The goal for blood sugar levels before meals  is between 80 and 130 mg/dL and 2 hours after eating  is lower than 180 mg/dL. • Make healthy food choices. Work with a dietitian to develop a meal plan that works for you and your schedule. A dietitian can help you learn how to eat the right amount of carbohydrates during your meals and snacks. Carbohydrates can raise your blood sugar level if you eat too many at one time. Examples of foods that contain carbohydrates are breads, cereals, rice, pasta, and sweets. • Eat high-fiber foods as directed. Fiber helps improve blood sugar levels. Fiber also lowers your risk for heart disease and other problems diabetes can cause. Examples of high-fiber foods include vegetables, whole-grain bread, and beans such as navarro beans. Your dietitian can tell you how much fiber to have each day. • Get regular physical activity. Physical activity can help you get to your target blood sugar level goal and manage your weight. Get at least 150 minutes of moderate to vigorous aerobic physical activity each week. Do not miss more than 2 days in a row. Do not sit longer than 30 minutes at a time. Your healthcare provider can help you create an activity plan. The plan can include the best activities for you and can help you build your strength and endurance. • Maintain a healthy weight. Ask your team what a healthy weight is for you. A healthy weight can help you control diabetes and prevent heart disease. Ask your team to help you create a weight loss plan, if needed. Weight loss can help make a difference in managing diabetes. Your team will help you set a weight-loss goal, such as 10 to 15 pounds, or 5% of your extra weight. Together you and your team can set manageable weight loss goals. • Take your diabetes medicine or insulin as directed.   You may need diabetes medicine, insulin, or both to help control your blood sugar levels. Your healthcare provider will teach you how and when to take your diabetes medicine or insulin. You will also be taught about side effects oral diabetes medicine can cause. Insulin may be injected or given through a pump or pen. You and your providers will decide on the best method for you:    ? An insulin pump  is an implanted device that gives your insulin 24 hours a day. An insulin pump prevents the need for multiple insulin injections in a day. ? An insulin pen  is a device prefilled with the right amount of insulin. ? You and your family members will be taught how to draw up and give insulin  if this is the best method for you. Your providers will also teach you how to dispose of needles and syringes. ? You will learn how much insulin you need  and when to give it. You will be taught when not to give insulin. You will also be taught what to do if your blood sugar level drops too low. This may happen if you take insulin and do not eat the right amount of carbohydrates. More ways to manage type 2 diabetes:   • Wear medical alert identification. Wear medical alert jewelry or carry a card that says you have diabetes. Ask your provider where to get these items. • Do not smoke. Nicotine and other chemicals in cigarettes and cigars can cause lung and blood vessel damage. It also makes it more difficult to manage your diabetes. Ask your provider for information if you currently smoke and need help to quit. Do not use e-cigarettes or smokeless tobacco in place of cigarettes or to help you quit. They still contain nicotine. • Check your feet each day for cuts, scratches, calluses, or other wounds. Look for redness and swelling, and feel for warmth. Wear shoes that fit well. Check your shoes for rocks or other objects that can hurt your feet. Do not walk barefoot or wear shoes without socks.  Wear cotton socks to help keep your feet dry. • Ask about vaccines you may need. You have a higher risk for serious illness if you get the flu, pneumonia, COVID-19, or hepatitis. Ask your provider if you should get vaccines to prevent these or other diseases, and when to get the vaccines. • Talk to your provider if you become stressed about diabetes care. Sometimes being able to fit diabetes care into your life can cause increased stress. The stress can cause you not to take care of yourself properly. Your care team providers can help by offering tips about self-care. Your providers may suggest you talk to a mental health provider who can listen and offer help with self-care issues. • Have your A1c checked as directed. Your provider may check your A1c every 3 months, or 2 times each year if your diabetes is controlled. An A1c test shows the average amount of sugar in your blood over the past 2 to 3 months. Your provider will tell you what your A1c level should be. • Have screening tests as directed. Your provider may recommend screening for complications of diabetes and other conditions that may develop. Some screenings may begin right away and some may happen within the first 5 years of diagnosis:    ? Examples of diabetes complications  include kidney problems, high cholesterol, high blood pressure, blood vessel problems, eye problems, and sleep apnea. ? You may be screened for a low vitamin B level  if you take oral diabetes medicine for a long time. ? Women of childbearing years may be screened  for polycystic ovarian syndrome (PCOS). Follow up with your doctor or diabetes care team providers as directed: You may need to have blood tests done before your follow-up visit. The test results will show if changes need to be made in your treatment or self-care. Talk to your provider if you cannot afford your medicine. Write down your questions so you remember to ask them during your visits.   © Copyright [employer] 2022 Information is for End User's use only and may not be sold, redistributed or otherwise used for commercial purposes. The above information is an  only. It is not intended as medical advice for individual conditions or treatments. Talk to your doctor, nurse or pharmacist before following any medical regimen to see if it is safe and effective for you. What to Do if Your Blood Sugar is Low   AMBULATORY CARE:   Low blood sugar levels  (hypoglycemia) can happen with Type 1 and Type 2 diabetes. Low levels are more likely to happen if you use insulin. Hypoglycemia can cause you to have falls, accidents, and injuries. A blood sugar level that gets too low can lead to seizures, coma, and death. Learn to recognize the symptoms early so you can get treatment quickly. When your blood sugar is low you may feel:  • Sweaty    • Nervous or shaky    • Anxious or irritable    • Confused    • A fast, pounding heartbeat    • Extremely hungry    Have someone call your local emergency number (911 in the [address]) if:   • You cannot be woken. • You have a seizure. Call your doctor if:   • You have symptoms of a low blood sugar level, such as trouble thinking, sweating, or a pounding heartbeat. • Your blood sugar level is lower than normal and it does not improve with treatment. • You often have lower blood sugar levels than your target goals. • You have trouble coping with your illness, or you feel anxious or depressed. • You have questions or concerns about your condition or care. What to do if you have symptoms of low blood sugar:   • Check your blood sugar level, if possible. Your blood sugar level is too low if it is at or below 70 mg/dL. • Eat or drink 15 grams of fast-acting carbohydrate. Fast-acting carbohydrates will raise your blood sugar level quickly. Examples of 15 grams of fast-acting carbohydrates:     ? 4 ounces (½ cup) of fruit juice     ? 4 ounces of regular soda    ?  2 tablespoons of raisins     ? 1 tube of glucose gel or 3 to 4 glucose tablets       • Check your blood sugar level 15 minutes later. If the level is still low (less than 100 mg/dL), eat another 15 grams of carbohydrate. When the level returns to 100 mg/dL, eat a snack or meal that contains carbohydrates. This will help prevent another drop in blood sugar. • Teach people close to you how to use your glucagon kit. Your blood sugar may be too low for you to be awake. People need to know when and how to use your kit. Prevent low blood sugar levels:  Prevent low blood sugar by knowing what increases your risk. Ask your healthcare provider for ways to prevent low blood sugar levels. Any of the following can increase your risk of low blood sugar:  • Fasting for tests or procedures    • During or after intense exercise    • Late or postponed meals    • Sleeping (you may need a bedtime snack)     • Drinking alcohol if you use insulin or insulin releasing pills    Follow up with your doctor as directed:  Write down your questions so you remember to ask them during your visits. © Copyright [facility] 2022 Information is for End User's use only and may not be sold, redistributed or otherwise used for commercial purposes. The above information is an  only. It is not intended as medical advice for individual conditions or treatments. Talk to your doctor, nurse or pharmacist before following any medical regimen to see if it is safe and effective for you.

## 2023-08-28 ENCOUNTER — CLINICAL SUPPORT (OUTPATIENT)
Dept: FAMILY MEDICINE CLINIC | Facility: CLINIC | Age: 63
End: 2023-08-28

## 2023-08-28 DIAGNOSIS — E11.69 TYPE 2 DIABETES MELLITUS WITH OTHER SPECIFIED COMPLICATION, WITH LONG-TERM CURRENT USE OF INSULIN (HCC): Primary | ICD-10-CM

## 2023-08-28 DIAGNOSIS — Z79.4 TYPE 2 DIABETES MELLITUS WITH OTHER SPECIFIED COMPLICATION, WITH LONG-TERM CURRENT USE OF INSULIN (HCC): Primary | ICD-10-CM

## 2023-08-28 NOTE — PROGRESS NOTES
[address]    Flores Mercado is a 57 y.o. female who was referred to the pharmacist for diabetes education and management, referred by Edilma Osullivan DO . Telemedicine consent  The patient was identified by name and date of birth. Flores Mercado was informed that this is a telemedicine visit and that the visit is being conducted through Telephone. My office door was closed. No one else was in the room. She acknowledged consent and understanding of privacy and security of the video platform. The patient has agreed to participate and understands they can discontinue the visit at any time. Assessment/ Plan       Type 2 Diabetes:  Goal A1c <7 based on ADA/AACE/ACE guidelines   Most recent A1c above goal  Reported Home SMBG  -132mg/dL  Bedtime 200-300mg/dL depending on what/how much she ate  Complications:  Microvascular complications: none noted  Macrovascular complications: none noted  Current Diabetes Regimen:  Metformin 1000mg BID  Glimepiride 4mg BID  Relion NPH 10 units at bedtime    Changes to Medication Regimen: If PCP is in agreement with plan  Recommend increasing NPH to 10 units BID - to be discussed with patient, she had to leave our appt early  Refer to social work care management for PACE investigation/help with Part D plans    Most recent labs and diabetes goals discussed   Materials Provided: none  Labs Ordered: none      2. Need for Additional Therapies:  Statin: not currently on - consider moderate statin therapy  ACEI/ARB: on lisinopril 10mg daily  ASA: not indicated    3. Health Maintenance:  Vaccine recommendations: per pcp  Eye Exam: utd  Foot Exam: utd  Urine Microalbumin: utd    4. Medication Reconciliation:   Medication list reviewed with pt at today's visit. Discrepancies as discussed below.   Medication list updated to reflect medications pt is currently taking  Renewal request sent to prescribing provider for: none       5. Hyperlipidemia without clinical ASCVD event:   2018 ACC/AHA lipid guidelines recommend moderate intensity statin. Patient currently not taking statin   Lipid panel/LFTs acceptable. Medications: TBD with pcp    6. HTN:   BP goal less than 130/80 mmHg. In office BP below goal, HR acceptable. Patient with room for lifestyle modifications. Serum K+ WNL. Monitoring: FBG daily    Referrals placed at this visit: Social Work Care Management    Follow-up: 2 weeks insulin dose etc      Subjective   Doing well. Relion cost acceptable at present. No issues with administration. One overnight she felt "off", checked her glucose - 99mg/dL. Aware of signs/symptoms how to treat hypoglycemia    Medication Adherence/ Tolerability:  Patient does not have issues with side effects, no GI discomfort nor hypoglycemia  The patient reports missing 0 doses of medication in the past 2 weeks. Medications Tried in Past:  - none    2. Lifestyle:   Last visit with dietician: n/a  Previously attended [facility] with Diabetes Class: no  Diet Recall:   Breakfast:   Lunch:   Dinner:   Snacks:   Beverages:   Physical Activity: limited    3.  Home monitoring devices  Glucometer: Yes, Brand: unknown  CGM: No, Brand: n/a  BP monitor: No, Brand: n/a      Objective       Current Outpatient Medications   Medication Instructions   • glimepiride (AMARYL) 4 mg, Oral, 2 times daily   • insulin isophane (HUMULIN N,NOVOLIN N) 10 Units, Subcutaneous, Daily at bedtime   • Insulin Pen Needle (ReliOn Pen Needles) 32G X 4 MM MISC Use daily as directed with insulin pen   • lisinopril (ZESTRIL) 10 mg, Oral, Daily   • meloxicam (MOBIC) 15 mg, Oral, Daily   • metFORMIN (GLUCOPHAGE) 1,000 mg, Oral, Every 12 hours   • multivitamin (THERAGRAN) TABS 1 tablet, Oral, Daily   • sitaGLIPtin (JANUVIA) 100 mg, Oral, Daily     Allergies   Allergen Reactions   • Codeine    • Naproxen    • Pentobarbital Vomiting   • Sodium Perborate      Sodium penethol   • Sulfa Antibiotics        Immunization History   Administered Date(s) Administered   • COVID-19 PFIZER VACCINE 0.3 ML IM 01/08/2021, 01/29/2021   • Influenza Quadrivalent Preservative Free 3 years and older IM 10/01/2017   • Pneumococcal Conjugate Vaccine 20-valent (Pcv20), Polysace 05/04/2023   • Zoster 05/03/2018, 07/24/2018       I have reviewed the patient's allergies, medications and history as noted in the electronic medical record and updated as necessary. Lab Results   Component Value Date    HGBA1C 10.5 (A) 08/01/2023    HGBA1C 11.3 (A) 05/04/2023    HGBA1C 9.0 (A) 03/28/2022       Blood Glucose Readings  The patient is currently checking blood glucose 0 times per day. Patient does not report with SMBG logs. Date AM Post-Breakfast Lunch Post-Lunch Dinner Post-Dinner Comments                                                                                                                           Avg            Hypoglycemia: The patient had 1 episodes/symptoms of hypoglycemia. Hyperglycemia: The patient had 0 symptoms of hyperglycemia.      ASCVD Risk:  The ASCVD Risk score (Mynor DK, et al., 2019) failed to calculate for the following reasons:    Cannot find a previous HDL lab    Cannot find a previous total cholesterol lab     Vitals:  BP Readings from Last 3 Encounters:   08/01/23 132/82   06/05/23 150/68   05/04/23 136/80     Resp Readings from Last 3 Encounters:   08/01/23 14   06/05/23 18   05/04/23 16     PF Readings from Last 3 Encounters:   No data found for PF     Wt Readings from Last 3 Encounters:   08/01/23 98.6 kg (217 lb 6.4 oz)   06/05/23 98.4 kg (217 lb)   05/04/23 98.5 kg (217 lb 3.2 oz)       Labs:    Lab Results   Component Value Date    SODIUM 137 06/29/2023    K 4.9 06/29/2023    EGFR 63 06/29/2023    CREATININE 1.00 06/29/2023    [id number] 819 09/12/2019    MICROALBCRE 115 (H) 05/04/2023         Pharmacist Tracking Tool     Pharmacist Tracking Tool  Reason For Outreach: Embedded Pharmacist  Demographics:  Intervention Method: Phone  Type of Intervention: Follow-Up  Topics Addressed: Diabetes, Dyslipidemia and Hypertension  Pharmacologic Interventions: Prevent or Manage CARLO and Med Rec  Non-Pharmacologic Interventions: Adherence addressed, Care coordination, Chart update, Cost, Disease state education, Home Monitoring and Medication/Device education  Time:  Direct Patient Care: 20 mins  Care Coordination: 45 mins  Recommendation Recipient: Patient/Caregiver  Outcome: Accepted

## 2023-08-30 DIAGNOSIS — Z78.9 DIFFICULTY REFILLING PRESCRIPTIONS: Primary | ICD-10-CM

## 2023-08-31 ENCOUNTER — PATIENT OUTREACH (OUTPATIENT)
Dept: FAMILY MEDICINE CLINIC | Facility: CLINIC | Age: 63
End: 2023-08-31

## 2023-08-31 DIAGNOSIS — Z78.9 DIFFICULTY REFILLING PRESCRIPTIONS: Primary | ICD-10-CM

## 2023-08-31 NOTE — PROGRESS NOTES
OP CM called to pt in regards to cost of DM meds. Pt states her income is under 29034. Pt is interested in doing a PACENET application. Pt is working 25 hours a week. Pt will not be 63 until Sep 6th. Will have [facility] reach out to pt to assist with [address] Pt is driving so she had to get off the phone. Pt also states a rep from [employer] is calling pt Sep 7th to discuss RX plan. Pt is aware she can have PACENET in addition to her RX plan. OP CM will remain available.

## 2023-09-08 ENCOUNTER — PATIENT OUTREACH (OUTPATIENT)
Dept: FAMILY MEDICINE CLINIC | Facility: CLINIC | Age: 63
End: 2023-09-08

## 2023-09-08 NOTE — PROGRESS NOTES
CMOC received a referral from [employer] to assist patient with applying for PACE. [facility] attempted to contact Ruddy Edi to discuss the referral.  No answer. Left message on voicemail with reason for call, this writer's contact information, and a request for a call back to assist.     Will outreach again in one week if no call back prior.

## 2023-09-18 ENCOUNTER — PATIENT OUTREACH (OUTPATIENT)
Dept: FAMILY MEDICINE CLINIC | Facility: CLINIC | Age: 63
End: 2023-09-18

## 2023-09-18 NOTE — PROGRESS NOTES
[address] [place] [address] contacted Nasir Rodriguez to discuss the referral for assistance with applying for PACE. She is agreeable to services but states she is unable to complete the application with this writer at present d/t having her grandkids. She requests a call back on Thursday. [address] [place] [address] 165 agreed and call ended. Will outreach again Thursday to complete a PACE application.

## 2023-09-25 ENCOUNTER — PATIENT OUTREACH (OUTPATIENT)
Dept: FAMILY MEDICINE CLINIC | Facility: CLINIC | Age: 63
End: 2023-09-25

## 2023-09-25 NOTE — PROGRESS NOTES
[facility] contacted Ruddy Edi to assist with completing a an application for PACE medication assistance. The application was completed on the [employer] and Ruddy Daley provided her email address for the submission confirmation email. This writer informed her that she may need to provide her new insurance information or other information in order to process the application. Ruddy Daley expressed understanding and will check her email for any updates. Will outreach again in one week for a status update and to assist with any further needs.

## 2023-10-04 ENCOUNTER — TELEPHONE (OUTPATIENT)
Dept: FAMILY MEDICINE CLINIC | Facility: CLINIC | Age: 63
End: 2023-10-04

## 2023-10-05 ENCOUNTER — PATIENT OUTREACH (OUTPATIENT)
Dept: FAMILY MEDICINE CLINIC | Facility: CLINIC | Age: 63
End: 2023-10-05

## 2023-10-05 NOTE — PROGRESS NOTES
[facility] 165 attempted to contact patient for a status update on the PACE application submitted online. No answer. Left message on voicemail with reason for call, this writer's contact information, and a request for a call back to discuss. Will outreach in one week if no call back prior.

## 2023-10-09 ENCOUNTER — VBI (OUTPATIENT)
Dept: ADMINISTRATIVE | Facility: OTHER | Age: 63
End: 2023-10-09

## 2023-10-09 ENCOUNTER — CLINICAL SUPPORT (OUTPATIENT)
Dept: FAMILY MEDICINE CLINIC | Facility: CLINIC | Age: 63
End: 2023-10-09

## 2023-10-09 DIAGNOSIS — E11.9 TYPE 2 DIABETES MELLITUS WITHOUT COMPLICATION, WITHOUT LONG-TERM CURRENT USE OF INSULIN (HCC): ICD-10-CM

## 2023-10-09 DIAGNOSIS — E11.69 TYPE 2 DIABETES MELLITUS WITH OTHER SPECIFIED COMPLICATION, WITH LONG-TERM CURRENT USE OF INSULIN (HCC): Primary | ICD-10-CM

## 2023-10-09 DIAGNOSIS — Z79.4 TYPE 2 DIABETES MELLITUS WITH OTHER SPECIFIED COMPLICATION, WITH LONG-TERM CURRENT USE OF INSULIN (HCC): Primary | ICD-10-CM

## 2023-10-09 NOTE — PROGRESS NOTES
[facility]  Jamal Sheppard    Jermaine Palomo is a 70 y.o. female who was referred to the pharmacist for diabetes education and management, referred by Melia Dakins, DO . Telemedicine consent  The patient was identified by name and date of birth. Jermaine Palomo was informed that this is a telemedicine visit and that the visit is being conducted through Telephone. My office door was closed. No one else was in the room. She acknowledged consent and understanding of privacy and security of the video platform. The patient has agreed to participate and understands they can discontinue the visit at any time. Assessment/ Plan       1. Type 2 Diabetes:  • Goal A1c <7 based on ADA/AACE/ACE guidelines   • Most recent A1c above goal  • Reported Home SMBG  - FBG  mg/dL  - Bedtime 200-300mg/dL depending on what/how much she ate  - Does have occasional overnight low if she eats less food before bedtime. • Complications:  o Microvascular complications: none noted  o Macrovascular complications: none noted  • Current Diabetes Regimen:  o Metformin 1000mg BID  o Glimepiride 4mg BID  o Relion NPH 10 units daily    Changes to Medication Regimen: If PCP is in agreement with plan  • Continue current doses for now. Recommend small snack before bedtime if bedtime blood sugar is <130 mg/dL  • Patient waiting for [address] card in mail. Once she receives new card we will transition off NPH insulin and initiate GLP-1 agonist. Less risk for hypoglycemia events and benefit of weight loss. She will call me once she receives the card. 2. Need for Additional Therapies:  • Statin: not currently on - consider moderate statin therapy  • ACEI/ARB: on lisinopril 10mg daily  • ASA: not indicated    3. Health Maintenance:  • Vaccine recommendations: per pcp  • Eye Exam: utd  • Foot Exam: utd  • Urine Microalbumin: utd    4.  Medication Reconciliation:   • Medication list reviewed with pt at today's visit. Discrepancies as discussed below. • Medication list updated to reflect medications pt is currently taking  • Renewal request sent to prescribing provider for: none       5. Hyperlipidemia without clinical ASCVD event:   2018 ACC/AHA lipid guidelines recommend moderate intensity statin. • Patient currently not taking statin   • Lipid panel/LFTs acceptable. • Medications: TBD with pcp    6. HTN:   BP goal less than 130/80 mmHg. • In office BP below goal, HR acceptable. • Patient with room for lifestyle modifications. • Serum K+ WNL. Monitoring: FBG daily    Referrals placed at this visit: None- SW referral already placed and working with patient    Follow-up: patient to call once [employer] card    Subjective     Medication Adherence/ Tolerability:  • Metformin 1000 mg BID  • Glimepiride 4 mg BID  • NPH 10 units once daily- patient did not increase to BID dosing. Occasional low blood sugar over night if she injects bedtime insulin and her bedtime reading is < 100 mg/dL. She does treat low blood sugar event appropriately. Medications Tried in Past:  - none    2. Lifestyle:   • Last visit with dietician: n/a  • Previously attended Living Well with Diabetes Class: no  • Diet Recall:   o Using equal packets instead of sugar. o Does admit that diet could be better controlled   • Physical Activity: limited    3.  Home monitoring devices  • Glucometer: Yes, Brand: unknown  • CGM: No, Brand: n/a  • BP monitor: No, Brand: n/a      Objective       Current Outpatient Medications   Medication Instructions   • glimepiride (AMARYL) 4 mg, Oral, 2 times daily   • insulin isophane (HUMULIN N,NOVOLIN N) 10 Units, Subcutaneous, Daily at bedtime   • Insulin Pen Needle (ReliOn Pen Needles) 32G X 4 MM MISC Use daily as directed with insulin pen   • lisinopril (ZESTRIL) 10 mg, Oral, Daily   • meloxicam (MOBIC) 15 mg, Oral, Daily   • metFORMIN (GLUCOPHAGE) 1,000 mg, Oral, Every 12 hours • multivitamin (THERAGRAN) TABS 1 tablet, Oral, Daily   • sitaGLIPtin (JANUVIA) 100 mg, Oral, Daily     Allergies   Allergen Reactions   • Codeine    • Naproxen    • Pentobarbital Vomiting   • Sodium Perborate      Sodium penethol   • Sulfa Antibiotics        Immunization History   Administered Date(s) Administered   • COVID-19 PFIZER VACCINE 0.3 ML IM 2021, 2021   • Influenza Quadrivalent Preservative Free 3 years and older IM 10/01/2017   • Pneumococcal Conjugate Vaccine 20-valent (Pcv20), Polysace 2023   • Zoster 2018, 2018       I have reviewed the patient's allergies, medications and history as noted in the electronic medical record and updated as necessary. Lab Results   Component Value Date    HGBA1C 10.5 (A) 2023    HGBA1C 11.3 (A) 2023    HGBA1C 9.0 (A) 2022       Blood Glucose Readings  The patient is currently checking blood glucose 0 times per day. Patient does not report with SMBG logs. Today's AM readin mg/dL  FB- 130 mg/dL   Throughout day and Evenin - 300 mg/dL  Hypoglycemia events: 2 total: 50 mg/dL, 66 mg/dL     Hypoglycemia: The patient had 2 episodes/symptoms of hypoglycemia. Hyperglycemia: The patient had 0 symptoms of hyperglycemia.      ASCVD Risk:  The ASCVD Risk score ([place] DK, et al., 2019) failed to calculate for the following reasons:    Cannot find a previous HDL lab    Cannot find a previous total cholesterol lab     Vitals:  BP Readings from Last 3 Encounters:   23 132/82   23 150/68   23 136/80     Resp Readings from Last 3 Encounters:   23 14   23 18   23 16     PF Readings from Last 3 Encounters:   No data found for PF     Wt Readings from Last 3 Encounters:   23 98.6 kg (217 lb 6.4 oz)   23 98.4 kg (217 lb)   23 98.5 kg (217 lb 3.2 oz)       Labs:    Lab Results   Component Value Date    SODIUM 137 2023    K 4.9 2023    EGFR 63 2023 CREATININE 1.00 06/29/2023    [id number] 819 09/12/2019    MICROALBCRE 115 (H) 05/04/2023         Pharmacist Tracking Tool     Pharmacist Tracking Tool  Reason For Outreach: Embedded Pharmacist  Demographics:  Intervention Method: Phone  Type of Intervention: Follow-Up  Topics Addressed: Diabetes, Dyslipidemia and Hypertension  Pharmacologic Interventions: Prevent or Manage CARLO and Med Rec  Non-Pharmacologic Interventions: Adherence addressed, Care coordination, Chart update, Cost, Disease state education, Home Monitoring and Medication/Device education  Time:  Direct Patient Care: 20 mins  Care Coordination: 10 mins  Recommendation Recipient: Patient/Caregiver and Provider  Outcome: Accepted

## 2023-10-10 DIAGNOSIS — M79.641 PAIN IN BOTH HANDS: ICD-10-CM

## 2023-10-10 DIAGNOSIS — M13.0 POLYARTICULAR ARTHRITIS: ICD-10-CM

## 2023-10-10 DIAGNOSIS — M79.642 PAIN IN BOTH HANDS: ICD-10-CM

## 2023-10-10 RX ORDER — MELOXICAM 15 MG/1
15 TABLET ORAL DAILY
Qty: 90 TABLET | Refills: 0 | Status: SHIPPED | OUTPATIENT
Start: 2023-10-10

## 2023-10-12 ENCOUNTER — PATIENT OUTREACH (OUTPATIENT)
Dept: FAMILY MEDICINE CLINIC | Facility: CLINIC | Age: 63
End: 2023-10-12

## 2023-10-12 NOTE — PROGRESS NOTES
[address] [address] contacted Lino Simmons for a status update on the PACE application completed 5/66/61. Lino Simmons explains that she was approved for PACE but her card was not received in the mail. She spoke with someone from [employer] and they were mailing another card but she has not received it yet. Call was completed approximately one week ago. [facility] offered to give contact information for PACE or complete a 3-way call with PACE to determine the expected delivery of the new card, Lino Simmons declined stating she was driving and was worried about entering a no service zone. CMOC recommended checking for the new card over the next few days and if not received by next outreach, completing a 3-way call with [employer] to determine status. Lino Simmons was agreeable and call ended. Will outreach next week for a status update.

## 2023-10-16 ENCOUNTER — TELEPHONE (OUTPATIENT)
Dept: FAMILY MEDICINE CLINIC | Facility: CLINIC | Age: 63
End: 2023-10-16

## 2023-10-16 NOTE — TELEPHONE ENCOUNTER
Called patient to schedule AWV. Patient will call back another time to schedule this appointment. She is unable to at this time.

## 2023-10-20 ENCOUNTER — TELEPHONE (OUTPATIENT)
Dept: FAMILY MEDICINE CLINIC | Facility: CLINIC | Age: 63
End: 2023-10-20

## 2023-10-20 ENCOUNTER — OFFICE VISIT (OUTPATIENT)
Dept: FAMILY MEDICINE CLINIC | Facility: CLINIC | Age: 63
End: 2023-10-20

## 2023-10-20 ENCOUNTER — PATIENT OUTREACH (OUTPATIENT)
Dept: FAMILY MEDICINE CLINIC | Facility: CLINIC | Age: 63
End: 2023-10-20

## 2023-10-20 VITALS
BODY MASS INDEX: 41.09 KG/M2 | HEART RATE: 75 BPM | RESPIRATION RATE: 18 BRPM | TEMPERATURE: 98.6 F | DIASTOLIC BLOOD PRESSURE: 72 MMHG | WEIGHT: 210.4 LBS | SYSTOLIC BLOOD PRESSURE: 124 MMHG | OXYGEN SATURATION: 97 %

## 2023-10-20 DIAGNOSIS — Z23 ENCOUNTER FOR IMMUNIZATION: ICD-10-CM

## 2023-10-20 DIAGNOSIS — Z00.00 MEDICARE ANNUAL WELLNESS VISIT, INITIAL: Primary | ICD-10-CM

## 2023-10-20 DIAGNOSIS — E11.65 TYPE 2 DIABETES MELLITUS WITH HYPERGLYCEMIA, WITHOUT LONG-TERM CURRENT USE OF INSULIN (HCC): ICD-10-CM

## 2023-10-20 DIAGNOSIS — E11.69 TYPE 2 DIABETES MELLITUS WITH OTHER SPECIFIED COMPLICATION, WITH LONG-TERM CURRENT USE OF INSULIN (HCC): ICD-10-CM

## 2023-10-20 DIAGNOSIS — R00.2 HEART PALPITATIONS: ICD-10-CM

## 2023-10-20 DIAGNOSIS — Z79.4 TYPE 2 DIABETES MELLITUS WITH OTHER SPECIFIED COMPLICATION, WITH LONG-TERM CURRENT USE OF INSULIN (HCC): ICD-10-CM

## 2023-10-20 DIAGNOSIS — E11.9 TYPE 2 DIABETES MELLITUS WITHOUT COMPLICATION, WITHOUT LONG-TERM CURRENT USE OF INSULIN (HCC): ICD-10-CM

## 2023-10-20 DIAGNOSIS — Z13.820 ENCOUNTER FOR OSTEOPOROSIS SCREENING IN ASYMPTOMATIC POSTMENOPAUSAL PATIENT: ICD-10-CM

## 2023-10-20 DIAGNOSIS — Z59.9 FINANCIAL DIFFICULTIES: ICD-10-CM

## 2023-10-20 DIAGNOSIS — I10 PRIMARY HYPERTENSION: ICD-10-CM

## 2023-10-20 DIAGNOSIS — I10 HYPERTENSION, ESSENTIAL: ICD-10-CM

## 2023-10-20 DIAGNOSIS — Z00.00 MEDICARE ANNUAL WELLNESS VISIT, SUBSEQUENT: ICD-10-CM

## 2023-10-20 DIAGNOSIS — Z78.0 ENCOUNTER FOR OSTEOPOROSIS SCREENING IN ASYMPTOMATIC POSTMENOPAUSAL PATIENT: ICD-10-CM

## 2023-10-20 RX ORDER — GLIMEPIRIDE 4 MG/1
4 TABLET ORAL 2 TIMES DAILY
Qty: 180 TABLET | Refills: 1 | Status: SHIPPED | OUTPATIENT
Start: 2023-10-20

## 2023-10-20 RX ORDER — LISINOPRIL 10 MG/1
10 TABLET ORAL DAILY
Qty: 90 TABLET | Refills: 1 | Status: SHIPPED | OUTPATIENT
Start: 2023-10-20

## 2023-10-20 SDOH — ECONOMIC STABILITY - INCOME SECURITY: PROBLEM RELATED TO HOUSING AND ECONOMIC CIRCUMSTANCES, UNSPECIFIED: Z59.9

## 2023-10-20 NOTE — PROGRESS NOTES
CMOC received an in basket message from [address] [place] [address]. that patient requests a call back regarding PACE. [facility] contacted Benjamin Sharma. She explains that she still hasn't received her PACE card via mail. This is the second card that was sent to her home address but she disclosed that she had to change her address at the post office so that may have been the delivery issue. This writer offered a 3-way call with PACE for a status update and to request a new card. Benjamin Sharma was agreeable. S/W Joseph Vera at [facility]. She confirmed Gloria's identity and demographic information. A new card will be mailed to Gloria's home address within 7-10 business days and Joseph Vera provided her with her PACE ID# so that she may update this information with the pharmacy. Call ended.     PACE ID# [id number]    Will outreach in two weeks to confirm receipt of her PACE card before closing the referral.

## 2023-10-20 NOTE — PATIENT INSTRUCTIONS
Medicare Preventive Visit Patient Instructions  Thank you for completing your Welcome to Medicare Visit or Medicare Annual Wellness Visit today. Your next wellness visit will be due in one year (10/20/2024). The screening/preventive services that you may require over the next 5-10 years are detailed below. Some tests may not apply to you based off risk factors and/or age. Screening tests ordered at today's visit but not completed yet may show as past due. Also, please note that scanned in results may not display below. Preventive Screenings:  Service Recommendations Previous Testing/Comments   Colorectal Cancer Screening  * Colonoscopy    * Fecal Occult Blood Test (FOBT)/Fecal Immunochemical Test (FIT)  * Fecal DNA/Cologuard Test  * Flexible Sigmoidoscopy Age: 43-73 years old   Colonoscopy: every 10 years (may be performed more frequently if at higher risk)  OR  FOBT/FIT: every 1 year  OR  Cologuard: every 3 years  OR  Sigmoidoscopy: every 5 years  Screening may be recommended earlier than age 39 if at higher risk for colorectal cancer. Also, an individualized decision between you and your healthcare provider will decide whether screening between the ages of 77-80 would be appropriate. Colonoscopy: 06/05/2014  FOBT/FIT: Not on file  Cologuard: Not on file  Sigmoidoscopy: Not on file          Breast Cancer Screening Age: 36 years old  Frequency: every 1-2 years  Not required if history of left and right mastectomy Mammogram: Not on file        Cervical Cancer Screening Between the ages of 21-29, pap smear recommended once every 3 years. Between the ages of 32-69, can perform pap smear with HPV co-testing every 5 years.    Recommendations may differ for women with a history of total hysterectomy, cervical cancer, or abnormal pap smears in past. Pap Smear: Not on file        Hepatitis C Screening Once for adults born between [address] [place] [address]  More frequently in patients at high risk for Hepatitis C Hep C Antibody: Not on file        Diabetes Screening 1-2 times per year if you're at risk for diabetes or have pre-diabetes Fasting glucose: No results in last 5 years (No results in last 5 years)  A1C: 10.5 (8/1/2023)      Cholesterol Screening Once every 5 years if you don't have a lipid disorder. May order more often based on risk factors. Lipid panel: 09/12/2019          Other Preventive Screenings Covered by Medicare:  Abdominal Aortic Aneurysm (AAA) Screening: covered once if your at risk. You're considered to be at risk if you have a family history of AAA. Lung Cancer Screening: covers low dose CT scan once per year if you meet all of the following conditions: (1) Age 48-67; (2) No signs or symptoms of lung cancer; (3) Current smoker or have quit smoking within the last 15 years; (4) You have a tobacco smoking history of at least 20 pack years (packs per day multiplied by number of years you smoked); (5) You get a written order from a healthcare provider. Glaucoma Screening: covered annually if you're considered high risk: (1) You have diabetes OR (2) Family history of glaucoma OR (3)  aged 48 and older OR (3)  American aged 72 and older  Osteoporosis Screening: covered every 2 years if you meet one of the following conditions: (1) You're estrogen deficient and at risk for osteoporosis based off medical history and other findings; (2) Have a vertebral abnormality; (3) On glucocorticoid therapy for more than 3 months; (4) Have primary hyperparathyroidism; (5) On osteoporosis medications and need to assess response to drug therapy. Last bone density test (DXA Scan): Not on file. HIV Screening: covered annually if you're between the age of 14-79. Also covered annually if you are younger than 13 and older than 72 with risk factors for HIV infection. For pregnant patients, it is covered up to 3 times per pregnancy.     Immunizations:  Immunization Recommendations   Influenza Vaccine Annual influenza vaccination during flu season is recommended for all persons aged >= 6 months who do not have contraindications   Pneumococcal Vaccine   * Pneumococcal conjugate vaccine = PCV13 (Prevnar 13), PCV15 (Vaxneuvance), PCV20 (Prevnar 20)  * Pneumococcal polysaccharide vaccine = PPSV23 (Pneumovax) Adults 67-98 yo with certain risk factors or if 69+ yo  If never received any pneumonia vaccine: recommend Prevnar 20 (PCV20)  Give PCV20 if previously received 1 dose of PCV13 or PPSV23   Hepatitis B Vaccine 3 dose series if at intermediate or high risk (ex: diabetes, end stage renal disease, liver disease)   Respiratory syncytial virus (RSV) Vaccine - COVERED BY MEDICARE PART D  * RSVPreF3 (Arexvy) CDC recommends that adults 61years of age and older may receive a single dose of RSV vaccine using shared clinical decision-making (SCDM)   Tetanus (Td) Vaccine - COST NOT COVERED BY MEDICARE PART B Following completion of primary series, a booster dose should be given every 10 years to maintain immunity against tetanus. Td may also be given as tetanus wound prophylaxis. Tdap Vaccine - COST NOT COVERED BY MEDICARE PART B Recommended at least once for all adults. For pregnant patients, recommended with each pregnancy. Shingles Vaccine (Shingrix) - COST NOT COVERED BY MEDICARE PART B  2 shot series recommended in those 19 years and older who have or will have weakened immune systems or those 50 years and older     Health Maintenance Due:      Topic Date Due   • HIV Screening  Never done   • Cervical Cancer Screening  Never done   • Hepatitis C Screening  05/04/2024 (Originally 1960)   • Breast Cancer Screening: Mammogram  05/04/2024 (Originally 9/6/1998)   • Colorectal Cancer Screening  06/05/2024     Immunizations Due:      Topic Date Due   • COVID-19 Vaccine (3 - Pfizer series) 03/26/2021   • Influenza Vaccine (1) 09/01/2023     Advance Directives   What are advance directives?   Advance directives are legal documents that state your wishes and plans for medical care. These plans are made ahead of time in case you lose your ability to make decisions for yourself. Advance directives can apply to any medical decision, such as the treatments you want, and if you want to donate organs. What are the types of advance directives? There are many types of advance directives, and each state has rules about how to use them. You may choose a combination of any of the following:  Living will: This is a written record of the treatment you want. You can also choose which treatments you do not want, which to limit, and which to stop at a certain time. This includes surgery, medicine, IV fluid, and tube feedings. Durable power of  for [facility]): This is a written record that states who you want to make healthcare choices for you when you are unable to make them for yourself. This person, called a proxy, is usually a family member or a friend. You may choose more than 1 proxy. Do not resuscitate (DNR) order:  A DNR order is used in case your heart stops beating or you stop breathing. It is a request not to have certain forms of treatment, such as CPR. A DNR order may be included in other types of advance directives. Medical directive: This covers the care that you want if you are in a coma, near death, or unable to make decisions for yourself. You can list the treatments you want for each condition. Treatment may include pain medicine, surgery, blood transfusions, dialysis, IV or tube feedings, and a ventilator (breathing machine). Values history: This document has questions about your views, beliefs, and how you feel and think about life. This information can help others choose the care that you would choose. Why are advance directives important? An advance directive helps you control your care. Although spoken wishes may be used, it is better to have your wishes written down.  Spoken wishes can be misunderstood, or not followed. Treatments may be given even if you do not want them. An advance directive may make it easier for your family to make difficult choices about your care. Weight Management   Why it is important to manage your weight:  Being overweight increases your risk of health conditions such as heart disease, high blood pressure, type 2 diabetes, and certain types of cancer. It can also increase your risk for osteoarthritis, sleep apnea, and other respiratory problems. Aim for a slow, steady weight loss. Even a small amount of weight loss can lower your risk of health problems. How to lose weight safely:  A safe and healthy way to lose weight is to eat fewer calories and get regular exercise. You can lose up about 1 pound a week by decreasing the number of calories you eat by 500 calories each day. Healthy meal plan for weight management:  A healthy meal plan includes a variety of foods, contains fewer calories, and helps you stay healthy. A healthy meal plan includes the following:  Eat whole-grain foods more often. A healthy meal plan should contain fiber. Fiber is the part of grains, fruits, and vegetables that is not broken down by your body. Whole-grain foods are healthy and provide extra fiber in your diet. Some examples of whole-grain foods are whole-wheat breads and pastas, oatmeal, brown rice, and bulgur. Eat a variety of vegetables every day. Include dark, leafy greens such as spinach, kale, javier greens, and mustard greens. Eat yellow and orange vegetables such as carrots, sweet potatoes, and winter squash. Eat a variety of fruits every day. Choose fresh or canned fruit (canned in its own juice or light syrup) instead of juice. Fruit juice has very little or no fiber. Eat low-fat dairy foods. Drink fat-free (skim) milk or 1% milk. Eat fat-free yogurt and low-fat cottage cheese. Try low-fat cheeses such as mozzarella and other reduced-fat cheeses.   Choose meat and other protein foods that are low in fat. Choose beans or other legumes such as split peas or lentils. Choose fish, skinless poultry (chicken or turkey), or lean cuts of red meat (beef or pork). Before you cook meat or poultry, cut off any visible fat. Use less fat and oil. Try baking foods instead of frying them. Add less fat, such as margarine, sour cream, regular salad dressing and mayonnaise to foods. Eat fewer high-fat foods. Some examples of high-fat foods include french fries, doughnuts, ice cream, and cakes. Eat fewer sweets. Limit foods and drinks that are high in sugar. This includes candy, cookies, regular soda, and sweetened drinks. Exercise:  Exercise at least 30 minutes per day on most days of the week. Some examples of exercise include walking, biking, dancing, and swimming. You can also fit in more physical activity by taking the stairs instead of the elevator or parking farther away from stores. Ask your healthcare provider about the best exercise plan for you. © Copyright [employer] 2018 Information is for End User's use only and may not be sold, redistributed or otherwise used for commercial purposes.  All illustrations and images included in CareNotes® are the copyrighted property of A.D.A.M., Inc. or  [employer]

## 2023-10-20 NOTE — PROGRESS NOTES
Assessment and Plan:     1. Medicare annual wellness visit, initial        2. Type 2 diabetes mellitus with other specified complication, with long-term current use of insulin ([address])        3. Encounter for immunization        4. Medicare annual wellness visit, subsequent        5. Financial difficulties        6. Type 2 diabetes mellitus with hyperglycemia, without long-term current use of insulin (HCC)  glimepiride (AMARYL) 4 mg tablet    Lipid Panel with Direct LDL reflex    CBC and differential      7. Hypertension, essential  lisinopril (ZESTRIL) 10 mg tablet      8. Type 2 diabetes mellitus without complication, without long-term current use of insulin (HCC)  metFORMIN (GLUCOPHAGE) 1000 MG tablet      9. Encounter for osteoporosis screening in asymptomatic postmenopausal patient  DXA bone density spine hip and pelvis      10. Primary hypertension  Lipid Panel with Direct LDL reflex    CBC and differential      11. Heart palpitations  Magnesium        She is to have labs prior to her f/u. Will check Mg2+ on occasion, having some palpitations. Nothing on exam today - NSR. She is not due for A1C. Will need when she is seen in f/u. Poor DM control. PharmD on board trying to get her on GLP-1, switched from insulin. In the meantime, cont regimen. She is having some lows - she knows how to recognize and treat. She does not want statin at this time - recognizes its' importance with her comorbid conditions. She is encouraged to get her lipids checked. Discussed the importance of maintaining a healthy lifestyle through heart healthy diet and exercise. She is down weight since her last visit -- praised for this. Had flu shot at work and [employer]. She is not able to afford an eye exam  Declines Mammo/Gyn   Not yet due for CRC screening  Agrees to DEXA if this will be covered -- she worries about the cost of this. SW is on board. Working to get PACE.         Depression Screening and Follow-up Plan: Patient was screened for depression during today's encounter. They screened negative with a PHQ-2 score of 0. Preventive health issues were discussed with patient, and age appropriate screening tests were ordered as noted in patient's After Visit Summary. Personalized health advice and appropriate referrals for health education or preventive services given if needed, as noted in patient's After Visit Summary. Return for Dec with PA . Patient/Caretaker verbalized understanding and were in agreement with today's assessment and plan. Time was taken to address any questions patient/caretaker had. Indication/Risks/Benefits of medication(s) as prescribed were discussed with the patient/caretaker. The patient verbalized understanding and agreement and elects to take medications as prescribed. Time was taken to answer any questions the patient/caretaker may have had. Chief Complaint   Patient presents with    Medicare Wellness Visit        History of Present Illness:     Patient presents for a Medicare Wellness Visit       Patient Care Team:  Sierra Torres DO as PCP - General (Family Medicine)  SLIME Gipson as  Care Manager ([address])  [place], [place] [facility] ([facility] Coordination)     Review of Systems:     Review of Systems   All other systems reviewed and are negative.        Problem List:     Patient Active Problem List   Diagnosis    Type 2 diabetes mellitus with other specified complication, with long-term current use of insulin ([address])    Hyperlipidemia    Hypertension, essential    Morbid obesity with BMI of 40.0-44.9, adult (HCC)    GERD without esophagitis    Seasonal allergies    Polyarticular arthritis    Pain in both hands    Swelling of both hands    Encounter for diabetic foot exam ([address])    Mammogram declined    Onychomycosis    Bilateral carpal tunnel syndrome    Arthritis of knee      Past Medical and Surgical History:     Past Medical History:   Diagnosis Date    Arthritis     Bursitis of both feet     Carpal tunnel syndrome, bilateral     Diabetes mellitus ([address])     Hypertension      Past Surgical History:   Procedure Laterality Date    WISDOM TOOTH EXTRACTION        Family History:     Family History   Problem Relation Age of Onset    Heart disease Mother         Cardiac Disorder    Diabetes Mother         Mellitus    Cancer Mother     Heart disease Father         Cardiac Disorder    Cancer Father     Diabetes Sister         Mellitus    Cancer Brother     Diabetes Family         Mellitus      Social History:     Social History     Socioeconomic History    Marital status: Single     Spouse name: None    Number of children: None    Years of education: None    Highest education level: None   Occupational History    None   Tobacco Use    Smoking status: Never    Smokeless tobacco: Never   Substance and Sexual Activity    Alcohol use: No    Drug use: No    Sexual activity: Never   Other Topics Concern    None   Social History Narrative    None     Social Determinants of Health     Financial Resource Strain: Medium Risk (10/20/2023)    Overall Financial Resource Strain (CARDIA)     Difficulty of Paying Living Expenses: Somewhat hard   Food Insecurity: Not on file   Transportation Needs: No Transportation Needs (10/20/2023)    PRAPARE - Transportation     Lack of Transportation (Medical): No     Lack of Transportation (Non-Medical):  No   Physical Activity: Not on file   Stress: Not on file   Social Connections: Not on file   Intimate Partner Violence: Not on file   Housing Stability: Not on file      Medications and Allergies:     Current Outpatient Medications   Medication Sig Dispense Refill    glimepiride (AMARYL) 4 mg tablet Take 1 tablet (4 mg total) by mouth 2 (two) times a day 180 tablet 1    insulin isophane (HumuLIN N,NovoLIN N) 100 units/mL injection pen Inject 10 Units under the skin daily at bedtime 15 mL 1    Insulin Pen Needle (ReliOn Pen Needles) 32G X 4 MM MISC Use daily as directed with insulin pen 90 each 3    lisinopril (ZESTRIL) 10 mg tablet Take 1 tablet (10 mg total) by mouth daily 90 tablet 1    meloxicam (MOBIC) 15 mg tablet Take 1 tablet by mouth once daily 90 tablet 0    metFORMIN (GLUCOPHAGE) 1000 MG tablet Take 1 tablet (1,000 mg total) by mouth every 12 (twelve) hours 180 tablet 1    multivitamin (THERAGRAN) TABS Take 1 tablet by mouth daily       No current facility-administered medications for this visit. Allergies   Allergen Reactions    Codeine     Naproxen     Pentobarbital Vomiting    Sodium Perborate      Sodium penethol    Sulfa Antibiotics       Immunizations:     Immunization History   Administered Date(s) Administered    COVID-19 PFIZER VACCINE 0.3 ML IM 01/08/2021, 01/29/2021    Influenza Quadrivalent Preservative Free 3 years and older IM 10/01/2017    Pneumococcal Conjugate Vaccine 20-valent (Pcv20), Polysace 05/04/2023    Zoster 05/03/2018, 07/24/2018      Health Maintenance:         Topic Date Due    HIV Screening  11/20/2023 (Originally 9/6/1975)    Hepatitis C Screening  05/04/2024 (Originally 1960)    Breast Cancer Screening: Mammogram  05/04/2024 (Originally 9/6/1998)    Colorectal Cancer Screening  06/05/2024         Topic Date Due    COVID-19 Vaccine (3 - Pfizer series) 03/26/2021    Influenza Vaccine (1) 09/01/2023      Medicare Screening Tests and Risk Assessments:     Stefani Agrawal is here for her Subsequent Wellness visit. Health Risk Assessment:   Patient rates overall health as good. Patient feels that their physical health rating is same. Patient is satisfied with their life. Eyesight was rated as same. Hearing was rated as same. Patient feels that their emotional and mental health rating is same. Patients states they are never, rarely angry. Patient states they are sometimes unusually tired/fatigued. Pain experienced in the last 7 days has been some. Patient's pain rating has been 7/10. Patient states that she has experienced no weight loss or gain in last 6 months. Depression Screening:   PHQ-2 Score: 0      Fall Risk Screening: In the past year, patient has experienced: no history of falling in past year      Urinary Incontinence Screening:   Patient has not leaked urine accidently in the last six months. Home Safety:  Patient has trouble with stairs inside or outside of their home. Patient has working smoke alarms and has working carbon monoxide detector. Home safety hazards include: none. Nutrition:   Current diet is Diabetic. Medications:   Patient is currently taking over-the-counter supplements. OTC medications include: see medication list. Patient is able to manage medications. Activities of Daily Living (ADLs)/Instrumental Activities of Daily Living (IADLs):   Walk and transfer into and out of bed and chair?: Yes  Dress and groom yourself?: Yes    Bathe or shower yourself?: Yes    Feed yourself? Yes  Do your laundry/housekeeping?: Yes  Manage your money, pay your bills and track your expenses?: Yes  Make your own meals?: Yes    Do your own shopping?: Yes    Previous Hospitalizations:   Any hospitalizations or ED visits within the last 12 months?: Yes      Advance Care Planning:   Living will: Yes    Durable POA for healthcare:  Yes    Advanced directive: Yes    Advanced directive counseling given: Yes      Cognitive Screening:   Provider or family/friend/caregiver concerned regarding cognition?: No    PREVENTIVE SCREENINGS      Cardiovascular Screening:    General: History Lipid Disorder and Risks and Benefits Discussed    Due for: Lipid Panel      Diabetes Screening:     General: History Diabetes, Risks and Benefits Discussed and Screening Current      Colorectal Cancer Screening:     General: Screening Current      Breast Cancer Screening:     General: Patient Declines      Cervical Cancer Screening:    General: Screening Not Indicated      Osteoporosis Screening: General: Risks and Benefits Discussed    Due for: DXA Appendicular      Abdominal Aortic Aneurysm (AAA) Screening:        General: Screening Not Indicated      Lung Cancer Screening:     General: Screening Not Indicated      Hepatitis C Screening:    General: Screening Current    Screening, Brief Intervention, and Referral to Treatment (SBIRT)    Screening  Typical number of drinks in a day: 0  Typical number of drinks in a week: 0  Interpretation: Low risk drinking behavior. Single Item Drug Screening:  How often have you used an illegal drug (including marijuana) or a prescription medication for non-medical reasons in the past year? never    Single Item Drug Screen Score: 0  Interpretation: Negative screen for possible drug use disorder    Other Counseling Topics:   Car/seat belt/driving safety, skin self-exam, sunscreen and calcium and vitamin D intake and regular weightbearing exercise. No results found. Physical Exam:     /72   Pulse 75   Temp 98.6 °F (37 °C) (Tympanic)   Resp 18   Wt 95.4 kg (210 lb 6.4 oz)   SpO2 97%   BMI 41.09 kg/m²     Physical Exam  Vitals and nursing note reviewed. Constitutional:       Appearance: She is obese. HENT:      Head: Normocephalic and atraumatic. Eyes:      Conjunctiva/sclera: Conjunctivae normal.      Pupils: Pupils are equal, round, and reactive to light. Cardiovascular:      Rate and Rhythm: Normal rate and regular rhythm. Heart sounds: Normal heart sounds. Pulmonary:      Effort: Pulmonary effort is normal.      Breath sounds: Normal breath sounds. No wheezing, rhonchi or rales. Abdominal:      General: Bowel sounds are normal.      Palpations: Abdomen is soft. Musculoskeletal:         General: No swelling. Cervical back: Neck supple. Lymphadenopathy:      Cervical: No cervical adenopathy. Skin:     General: Skin is warm and dry. Neurological:      General: No focal deficit present.       Mental Status: She is alert and oriented to person, place, and time. Psychiatric:         Mood and Affect: Mood normal.         Behavior: Behavior normal.         Thought Content:  Thought content normal.         Judgment: Judgment normal.          Latisha Kit Bad, DO

## 2023-10-30 DIAGNOSIS — Z79.4 TYPE 2 DIABETES MELLITUS WITH OTHER SPECIFIED COMPLICATION, WITH LONG-TERM CURRENT USE OF INSULIN (HCC): Primary | ICD-10-CM

## 2023-10-30 DIAGNOSIS — E11.69 TYPE 2 DIABETES MELLITUS WITH OTHER SPECIFIED COMPLICATION, WITH LONG-TERM CURRENT USE OF INSULIN (HCC): Primary | ICD-10-CM

## 2023-10-30 NOTE — TELEPHONE ENCOUNTER
[facility]  Jamal Sheppard    Jovan Barney is a 70 y.o. female who was referred to the pharmacist for diabetes education and management, referred by Reynaldo Damian DO . Telemedicine consent  The patient was identified by name and date of birth. Jovan Barney was informed that this is a telemedicine visit and that the visit is being conducted through Telephone. My office door was closed. No one else was in the room. She acknowledged consent and understanding of privacy and security of the video platform. The patient has agreed to participate and understands they can discontinue the visit at any time. Assessment/ Plan       Type 2 Diabetes:  Goal A1c <7 based on ADA/AACE/ACE guidelines   Most recent A1c above goal  Reported Home SMBG  FBG  mg/dL  Bedtime 200-300mg/dL depending on what/how much she ate  Complications:  Microvascular complications: none noted  Macrovascular complications: none noted  Current Diabetes Regimen:  Metformin 1000mg BID  Glimepiride 4mg BID  Relion NPH 10 units daily    Changes to Medication Regimen: If PCP is in agreement with plan  Patient now has [address] card. Plan to transition from NPH insulin to GLP-1 agonist. Ideally would also like to stop glimepiride in future as well. Initiate Ozempic 0.25 mg weekly x 4 weeks then increase to 0.5 mg weekly  Decrease NPH to 5 units daily x 4 weeks then discontinue     2. Need for Additional Therapies:  Statin: not currently on - consider moderate statin therapy  ACEI/ARB: on lisinopril 10mg daily  ASA: not indicated    3. Health Maintenance:  Vaccine recommendations: per pcp  Eye Exam: utd  Foot Exam: utd  Urine Microalbumin: utd    4. Medication Reconciliation:   Medication list reviewed with pt at today's visit. Discrepancies as discussed below.   Medication list updated to reflect medications pt is currently taking  Renewal request sent to prescribing provider for: none       5. Hyperlipidemia without clinical ASCVD event:   2018 ACC/AHA lipid guidelines recommend moderate intensity statin. Patient currently not taking statin   Lipid panel/LFTs acceptable. Medications: TBD with pcp    6. HTN:   BP goal less than 130/80 mmHg. In office BP below goal, HR acceptable. Patient with room for lifestyle modifications. Serum K+ WNL. Monitoring: FBG daily    Referrals placed at this visit: None- SW referral already placed and working with patient    Follow-up: 3 weeks via phone     Subjective     Medication Adherence/ Tolerability:  Metformin 1000 mg BID  Glimepiride 4 mg BID  NPH 10 units once daily    Medications Tried in Past:  - none    2. Lifestyle:   Last visit with dietician: n/a  Previously attended [facility] with Diabetes Class: no  Diet Recall:   Using equal packets instead of sugar. Does admit that diet could be better controlled   Physical Activity: limited    3.  Home monitoring devices  Glucometer: Yes, Brand: unknown  CGM: No, Brand: n/a  BP monitor: No, Brand: n/a      Objective       Current Outpatient Medications   Medication Instructions    glimepiride (AMARYL) 4 mg, Oral, 2 times daily    insulin isophane (HUMULIN N,NOVOLIN N) 10 Units, Subcutaneous, Daily at bedtime    Insulin Pen Needle (ReliOn Pen Needles) 32G X 4 MM MISC Use daily as directed with insulin pen    lisinopril (ZESTRIL) 10 mg, Oral, Daily    meloxicam (MOBIC) 15 mg, Oral, Daily    metFORMIN (GLUCOPHAGE) 1,000 mg, Oral, Every 12 hours    multivitamin (THERAGRAN) TABS 1 tablet, Oral, Daily     Allergies   Allergen Reactions    Codeine     Naproxen     Pentobarbital Vomiting    Sodium Perborate      Sodium penethol    Sulfa Antibiotics        Immunization History   Administered Date(s) Administered    COVID-19 PFIZER VACCINE 0.3 ML IM 01/08/2021, 01/29/2021    Influenza Quadrivalent Preservative Free 3 years and older IM 10/01/2017    Pneumococcal Conjugate Vaccine 20-valent (Pcv20), Polysace 05/04/2023    Zoster 05/03/2018, 07/24/2018       I have reviewed the patient's allergies, medications and history as noted in the electronic medical record and updated as necessary. Lab Results   Component Value Date    HGBA1C 10.5 (A) 08/01/2023    HGBA1C 11.3 (A) 05/04/2023    HGBA1C 9.0 (A) 03/28/2022       Blood Glucose Readings  The patient is currently checking blood glucose 0 times per day. Patient does not report with SMBG logs. Fating readings are lower. Past 3 days were 95, 109, 75. During the day goes hgher 200's.       ASCVD Risk:  The ASCVD Risk score (Mynor ANTONIO, et al., 2019) failed to calculate for the following reasons:    Cannot find a previous HDL lab    Cannot find a previous total cholesterol lab     Vitals:  BP Readings from Last 3 Encounters:   10/20/23 124/72   08/01/23 132/82   06/05/23 150/68     Resp Readings from Last 3 Encounters:   10/20/23 18   08/01/23 14   06/05/23 18     PF Readings from Last 3 Encounters:   No data found for PF     Wt Readings from Last 3 Encounters:   10/20/23 95.4 kg (210 lb 6.4 oz)   08/01/23 98.6 kg (217 lb 6.4 oz)   06/05/23 98.4 kg (217 lb)       Labs:    Lab Results   Component Value Date    SODIUM 137 06/29/2023    K 4.9 06/29/2023    EGFR 63 06/29/2023    CREATININE 1.00 06/29/2023    [id number] 819 09/12/2019    MICROALBCRE 115 (H) 05/04/2023         Pharmacist Tracking Tool     Pharmacist Tracking Tool  Reason For Outreach: Embedded Pharmacist  Demographics:  Intervention Method: Phone  Type of Intervention: Follow-Up  Topics Addressed: Diabetes, Dyslipidemia and Hypertension  Pharmacologic Interventions: Medication Initiation and Dose or Frequency Adjusted  Non-Pharmacologic Interventions: Disease state education, Home Monitoring, and Medication/Device education  Time:  Direct Patient Care:  20  mins  Care Coordination:  10  mins  Recommendation Recipient: Patient/Caregiver and Provider  Outcome: Accepted

## 2023-11-08 ENCOUNTER — PATIENT OUTREACH (OUTPATIENT)
Dept: FAMILY MEDICINE CLINIC | Facility: CLINIC | Age: 63
End: 2023-11-08

## 2023-11-08 NOTE — PROGRESS NOTES
[address] [address] attempted to contact Sridhar Barrientos for a status update on receiving her PACE card in the mail. Sridhar Barrientos was approved for PACE card services but did not receive her first card d/t a mailing address issue. No answer. Left message on voicemail with reason for call, this writer's contact information, and a request for a call back. Will attempt a final outreach again in one week if no contact prior.

## 2023-11-20 ENCOUNTER — CLINICAL SUPPORT (OUTPATIENT)
Dept: FAMILY MEDICINE CLINIC | Facility: CLINIC | Age: 63
End: 2023-11-20

## 2023-11-20 DIAGNOSIS — E11.69 TYPE 2 DIABETES MELLITUS WITH OTHER SPECIFIED COMPLICATION, WITH LONG-TERM CURRENT USE OF INSULIN (HCC): Primary | ICD-10-CM

## 2023-11-20 DIAGNOSIS — Z79.4 TYPE 2 DIABETES MELLITUS WITH OTHER SPECIFIED COMPLICATION, WITH LONG-TERM CURRENT USE OF INSULIN (HCC): Primary | ICD-10-CM

## 2023-11-20 NOTE — PROGRESS NOTES
[address]    Jermaine Palomo is a 70 y.o. female who was referred to the pharmacist for diabetes education and management, referred by previous PCP Dr. Melia Dakins, DO . Telemedicine consent  The patient was identified by name and date of birth. Jermaine Palomo was informed that this is a telemedicine visit and that the visit is being conducted through Telephone. My office door was closed. No one else was in the room. She acknowledged consent and understanding of privacy and security of the video platform. The patient has agreed to participate and understands they can discontinue the visit at any time. Assessment/ Plan     Type 2 Diabetes:  Goal A1c <7 based on ADA/AACE/ACE guidelines   Most recent A1c above goal  Reported Home SMBG  Average FBG 96 mg/dL  Average Bedtime 120 mg/dL   Complications:  Microvascular complications: none noted  Macrovascular complications: none noted  Medication cost: enrolled in [facility]  Current Diabetes Regimen:  Metformin 1000mg BID  Glimepiride 4mg BID  Relion NPH 5 units daily  Ozempic 0.25 mg weekly     Changes to Medication Regimen: If PCP is in agreement with plan  Increase Ozempic to 0.5 mg weekly on 12/2  Discontinue NPH insulin on 12/2     2. Need for Additional Therapies:  Statin: not currently on - consider moderate statin therapy  ACEI/ARB: on lisinopril 10mg daily  ASA: not indicated    3. Health Maintenance:  Vaccine recommendations: per pcp  Eye Exam: utd  Foot Exam: utd  Urine Microalbumin: utd    4. Medication Reconciliation:   Medication list reviewed with pt at today's visit. Discrepancies as discussed below. Medication list updated to reflect medications pt is currently taking  Renewal request sent to prescribing provider for: none       5. Hyperlipidemia without clinical ASCVD event:   2018 ACC/AHA lipid guidelines recommend moderate intensity statin.    Patient currently not taking Tolerated well   statin   Lipid panel/LFTs acceptable. Medications: TBD with pcp    6. HTN:   BP goal less than 130/80 mmHg. In office BP below goal, HR acceptable. Patient with room for lifestyle modifications. Serum K+ WNL. Monitoring: FBG daily    Referrals placed at this visit: None- SW referral already placed and working with patient    Follow-up: 4 weeks    Subjective     Medication Adherence/ Tolerability:  Metformin 1000 mg BID  Glimepiride 4 mg BID  NPH 5 units once daily  Ozempic 0.25 mg weekly - 3 doses so far. Denies N/V/D. Medications Tried in Past:  - none    2. Lifestyle:   Last visit with dietician: n/a  Previously attended [facility] with Diabetes Class: no  Diet Recall:   Using equal packets instead of sugar. Does admit that diet could be better controlled   Physical Activity: limited    3.  Home monitoring devices  Glucometer: Yes, Brand: unknown  CGM: No, Brand: n/a  BP monitor: No, Brand: n/a      Objective       Current Outpatient Medications   Medication Instructions    glimepiride (AMARYL) 4 mg, Oral, 2 times daily    insulin isophane (HUMULIN N,NOVOLIN N) 5 Units, Subcutaneous, Daily at bedtime    Insulin Pen Needle (ReliOn Pen Needles) 32G X 4 MM MISC Use daily as directed with insulin pen    lisinopril (ZESTRIL) 10 mg, Oral, Daily    meloxicam (MOBIC) 15 mg, Oral, Daily    metFORMIN (GLUCOPHAGE) 1,000 mg, Oral, Every 12 hours    multivitamin (THERAGRAN) TABS 1 tablet, Oral, Daily    semaglutide, 0.25 or 0.5 mg/dose, (Ozempic, 0.25 or 0.5 MG/DOSE,) 2 mg/3 mL injection pen 0.25 mg under the skin every 7 days for 4 doses (28 days), THEN 0.5 mg under the skin every 7 days     Allergies   Allergen Reactions    Codeine     Naproxen     Pentobarbital Vomiting    Sodium Perborate      Sodium penethol    Sulfa Antibiotics        Immunization History   Administered Date(s) Administered    COVID-19 PFIZER VACCINE 0.3 ML IM 01/08/2021, 01/29/2021    Influenza Quadrivalent Preservative Free 3 years and older IM 10/01/2017    Pneumococcal Conjugate Vaccine 20-valent (Pcv20), Polysace 05/04/2023    Zoster 05/03/2018, 07/24/2018       I have reviewed the patient's allergies, medications and history as noted in the electronic medical record and updated as necessary. Lab Results   Component Value Date    HGBA1C 10.5 (A) 08/01/2023    HGBA1C 11.3 (A) 05/04/2023    HGBA1C 9.0 (A) 03/28/2022       Blood Glucose Readings  The patient is currently checking blood glucose 2 times per day. Patient reports with SMBG logs.     Date AM Post-Dinner Comments   11/4  202 Ozempic first dose   11/5 88 112 88 middle of night   11/6 111 140    11/7  211    11/8 106 190    11/9 98 288 chinese   11/10 -- 112    11/11 110 245    11/12 94 175    11/13 87 189    11/14 72 219    11/15 93 175    11/16 91 251    11/17 100 203    11/18 96 191    11/19 97     Avg 96 193        ASCVD Risk:  The ASCVD Risk score (Mynor ANTONIO, et al., 2019) failed to calculate for the following reasons:    Cannot find a previous HDL lab    Cannot find a previous total cholesterol lab     Vitals:  BP Readings from Last 3 Encounters:   10/20/23 124/72   08/01/23 132/82   06/05/23 150/68     Resp Readings from Last 3 Encounters:   10/20/23 18   08/01/23 14   06/05/23 18     PF Readings from Last 3 Encounters:   No data found for PF     Wt Readings from Last 3 Encounters:   10/20/23 95.4 kg (210 lb 6.4 oz)   08/01/23 98.6 kg (217 lb 6.4 oz)   06/05/23 98.4 kg (217 lb)     Labs:  Lab Results   Component Value Date    SODIUM 137 06/29/2023    K 4.9 06/29/2023    EGFR 63 06/29/2023    CREATININE 1.00 06/29/2023    [id number] 819 09/12/2019    MICROALBCRE 115 (H) 05/04/2023     Pharmacist Tracking Tool     Pharmacist Tracking Tool  Reason For Outreach: Embedded Pharmacist  Demographics:  Intervention Method: Phone  Type of Intervention: Follow-Up  Topics Addressed: Diabetes, Dyslipidemia and Hypertension  Pharmacologic Interventions: Medication Initiation and Dose or Frequency Adjusted  Non-Pharmacologic Interventions: Disease state education, Home Monitoring, and Medication/Device education  Time:  Direct Patient Care:  20  mins  Care Coordination:  10  mins  Recommendation Recipient: Patient/Caregiver and Provider  Outcome: Accepted

## 2023-11-24 ENCOUNTER — PATIENT OUTREACH (OUTPATIENT)
Dept: FAMILY MEDICINE CLINIC | Facility: CLINIC | Age: 63
End: 2023-11-24

## 2023-11-24 NOTE — PROGRESS NOTES
OP CM rcvd inbasket that pt rcvd her PACE card. Pt denies any other needs at this time. Will close case.

## 2023-11-30 ENCOUNTER — VBI (OUTPATIENT)
Dept: ADMINISTRATIVE | Facility: OTHER | Age: 63
End: 2023-11-30

## 2023-12-05 ENCOUNTER — OFFICE VISIT (OUTPATIENT)
Dept: FAMILY MEDICINE CLINIC | Facility: CLINIC | Age: 63
End: 2023-12-05
Payer: COMMERCIAL

## 2023-12-05 VITALS
OXYGEN SATURATION: 99 % | WEIGHT: 203.8 LBS | BODY MASS INDEX: 39.8 KG/M2 | TEMPERATURE: 98.4 F | SYSTOLIC BLOOD PRESSURE: 130 MMHG | DIASTOLIC BLOOD PRESSURE: 72 MMHG | RESPIRATION RATE: 18 BRPM | HEART RATE: 78 BPM

## 2023-12-05 DIAGNOSIS — E11.69 TYPE 2 DIABETES MELLITUS WITH OTHER SPECIFIED COMPLICATION, WITH LONG-TERM CURRENT USE OF INSULIN (HCC): Primary | ICD-10-CM

## 2023-12-05 DIAGNOSIS — Z79.4 TYPE 2 DIABETES MELLITUS WITH OTHER SPECIFIED COMPLICATION, WITH LONG-TERM CURRENT USE OF INSULIN (HCC): Primary | ICD-10-CM

## 2023-12-05 DIAGNOSIS — J06.9 UPPER RESPIRATORY TRACT INFECTION, UNSPECIFIED TYPE: ICD-10-CM

## 2023-12-05 DIAGNOSIS — Z13.6 SCREENING FOR CARDIOVASCULAR CONDITION: ICD-10-CM

## 2023-12-05 DIAGNOSIS — I10 HYPERTENSION, ESSENTIAL: ICD-10-CM

## 2023-12-05 LAB — SL AMB POCT HEMOGLOBIN AIC: 6.6 (ref ?–6.5)

## 2023-12-05 PROCEDURE — 83036 HEMOGLOBIN GLYCOSYLATED A1C: CPT

## 2023-12-05 PROCEDURE — 99213 OFFICE O/P EST LOW 20 MIN: CPT

## 2023-12-05 RX ORDER — AZITHROMYCIN 250 MG/1
TABLET, FILM COATED ORAL
Qty: 6 TABLET | Refills: 0 | Status: SHIPPED | OUTPATIENT
Start: 2023-12-05 | End: 2023-12-10

## 2023-12-05 NOTE — PROGRESS NOTES
Name: Hunter Griffin      : 1960      MRN: [id number]  Encounter Provider: Danica Miranda PA-C  Encounter Date: 2023   Encounter department: [address] [place] [address]     1. Type 2 diabetes mellitus with other specified complication, with long-term current use of insulin (HCC)  Assessment & Plan:  A1c is 6.6 today. Patient is to continue current medications. Patient is following up with pharmacy. She states she will be making an appointment with her regular ophthalmologist for diabetic eye exam.  Follow-up with A1c in 3 months. Lab Results   Component Value Date    HGBA1C 6.6 (A) 2023       Orders:  -     POCT hemoglobin A1c  -     CBC and differential; Future  -     Comprehensive metabolic panel; Future  -     HEMOGLOBIN A1C W/ EAG ESTIMATION; Future    2. Hypertension, essential  Assessment & Plan:  Stable. Continue current treatment. Orders:  -     Lipid Panel with Direct LDL reflex; Future  -     Magnesium; Future  -     CBC and differential; Future  -     Comprehensive metabolic panel; Future    3. Upper respiratory tract infection, unspecified type  Comments:  Patient prescribed Z-Som due to going on for 1 month. Patient educated on side effects, is told to call office if any present. Orders:  -     azithromycin (Zithromax) 250 mg tablet; Take 2 tablets (500 mg total) by mouth daily for 1 day, THEN 1 tablet (250 mg total) daily for 4 days. 4. Screening for cardiovascular condition  -     Lipid Panel with Direct LDL reflex; Future  -     Magnesium; Future  -     CBC and differential; Future  -     Comprehensive metabolic panel; Future           Subjective      Patient is a 76-year-old female presenting for follow-up chronic disease management. Patient is on ozempic now. Patient meds to her diet has not been good.   Patient also admits to lack of activity, but has been busy with work and moving so she has been more active than typical.  Patient has had no side effects she has noticed since starting the Ozempic. URI   This is a new problem. The current episode started more than 1 month ago (Once month ago. ). The problem has been gradually improving. There has been no fever. Associated symptoms include congestion, rhinorrhea, a sore throat and wheezing. Pertinent negatives include no abdominal pain, chest pain, coughing, diarrhea, dysuria, ear pain, headaches, joint pain, joint swelling, nausea, neck pain, plugged ear sensation, rash, sinus pain, sneezing, swollen glands or vomiting. Treatments tried: OTC cough medicine. The treatment provided no relief. Review of Systems   Constitutional:  Positive for appetite change (Decreased since starting ozempic.). Negative for chills, diaphoresis, fatigue and fever. HENT:  Positive for congestion, rhinorrhea and sore throat. Negative for ear pain, postnasal drip, sinus pressure, sinus pain and sneezing. Eyes:  Negative for pain and visual disturbance. Respiratory:  Positive for wheezing. Negative for apnea, cough, chest tightness and shortness of breath. Cardiovascular:  Negative for chest pain, palpitations and leg swelling. Gastrointestinal:  Negative for abdominal pain, blood in stool, constipation, diarrhea, nausea and vomiting. Endocrine: Negative. Genitourinary:  Negative for dysuria and hematuria. Musculoskeletal:  Negative for arthralgias, back pain, joint pain and neck pain. Skin:  Negative for color change and rash. Neurological:  Negative for seizures, syncope and headaches. Hematological: Negative. Psychiatric/Behavioral: Negative. All other systems reviewed and are negative.       Current Outpatient Medications on File Prior to Visit   Medication Sig    glimepiride (AMARYL) 4 mg tablet Take 1 tablet (4 mg total) by mouth 2 (two) times a day    lisinopril (ZESTRIL) 10 mg tablet Take 1 tablet (10 mg total) by mouth daily    meloxicam (MOBIC) 15 mg tablet Take 1 tablet by mouth once daily    metFORMIN (GLUCOPHAGE) 1000 MG tablet Take 1 tablet (1,000 mg total) by mouth every 12 (twelve) hours    multivitamin (THERAGRAN) TABS Take 1 tablet by mouth daily    semaglutide, 0.25 or 0.5 mg/dose, (Ozempic, 0.25 or 0.5 MG/DOSE,) 2 mg/3 mL injection pen 0.25 mg under the skin every 7 days for 4 doses (28 days), THEN 0.5 mg under the skin every 7 days       Objective     /72   Pulse 78   Temp 98.4 °F (36.9 °C) (Tympanic)   Resp 18   Wt 92.4 kg (203 lb 12.8 oz)   SpO2 99%   BMI 39.80 kg/m²     Physical Exam  Vitals and nursing note reviewed. Constitutional:       Appearance: Normal appearance. She is obese. HENT:      Head: Normocephalic and atraumatic. Right Ear: Tympanic membrane normal.      Left Ear: Tympanic membrane normal.      Nose: Congestion present. Mouth/Throat:      Mouth: Mucous membranes are moist.      Pharynx: Oropharynx is clear. Comments: PND. Eyes:      Extraocular Movements: Extraocular movements intact. Conjunctiva/sclera: Conjunctivae normal.      Pupils: Pupils are equal, round, and reactive to light. Cardiovascular:      Rate and Rhythm: Normal rate and regular rhythm. Pulses: Normal pulses. Heart sounds: Normal heart sounds. Pulmonary:      Effort: Pulmonary effort is normal. No respiratory distress. Breath sounds: Normal breath sounds. No wheezing. Chest:      Chest wall: No tenderness. Abdominal:      General: Bowel sounds are normal.      Palpations: Abdomen is soft. There is no mass. Tenderness: There is no abdominal tenderness. Musculoskeletal:         General: No tenderness. Normal range of motion. Cervical back: Normal range of motion and neck supple. No tenderness. Right lower leg: No edema. Left lower leg: No edema. Lymphadenopathy:      Cervical: No cervical adenopathy. Skin:     General: Skin is warm. Capillary Refill: Capillary refill takes less than 2 seconds. Findings: No erythema or rash. Neurological:      General: No focal deficit present. Mental Status: She is alert and oriented to person, place, and time. Mental status is at baseline. Motor: No weakness. Psychiatric:         Mood and Affect: Mood normal.         Behavior: Behavior normal.         Thought Content:  Thought content normal.         Judgment: Judgment normal.       Rito Méndez PA-C

## 2023-12-05 NOTE — ASSESSMENT & PLAN NOTE
A1c is 6.6 today. Patient is to continue current medications. Patient is following up with pharmacy. She states she will be making an appointment with her regular ophthalmologist for diabetic eye exam.  Follow-up with A1c in 3 months.   Lab Results   Component Value Date    HGBA1C 6.6 (A) 12/05/2023

## 2023-12-21 ENCOUNTER — VBI (OUTPATIENT)
Dept: ADMINISTRATIVE | Facility: OTHER | Age: 63
End: 2023-12-21

## 2024-01-03 ENCOUNTER — VBI (OUTPATIENT)
Dept: ADMINISTRATIVE | Facility: OTHER | Age: 64
End: 2024-01-03

## 2024-01-10 ENCOUNTER — CLINICAL SUPPORT (OUTPATIENT)
Dept: FAMILY MEDICINE CLINIC | Facility: CLINIC | Age: 64
End: 2024-01-10

## 2024-01-10 DIAGNOSIS — E11.69 TYPE 2 DIABETES MELLITUS WITH OTHER SPECIFIED COMPLICATION, WITH LONG-TERM CURRENT USE OF INSULIN (HCC): Primary | ICD-10-CM

## 2024-01-10 DIAGNOSIS — Z79.4 TYPE 2 DIABETES MELLITUS WITH OTHER SPECIFIED COMPLICATION, WITH LONG-TERM CURRENT USE OF INSULIN (HCC): Primary | ICD-10-CM

## 2024-01-10 RX ORDER — DULAGLUTIDE 0.75 MG/.5ML
0.75 INJECTION, SOLUTION SUBCUTANEOUS
Qty: 2 ML | Refills: 5 | Status: SHIPPED | OUTPATIENT
Start: 2024-01-10

## 2024-01-10 NOTE — PROGRESS NOTES
[facility]  Shalini Corbin, Pharmacist    Gloria Mitchell is a 63 y.o. female who was referred to the pharmacist for diabetes education and management, referred by previous PCP Dr. Latisha Hidalgo DO .    Telemedicine consent  The patient was identified by name and date of birth. Gloria Mitchell was informed that this is a telemedicine visit and that the visit is being conducted through Telephone.  My office door was closed. No one else was in the room.  She acknowledged consent and understanding of privacy and security of the video platform. The patient has agreed to participate and understands they can discontinue the visit at any time.    Assessment/ Plan     Type 2 Diabetes:  Goal A1c <7 based on ADA/AACE/ACE guidelines   Most recent A1c at goal 6.6% (12/5/23)  Reported Home SMBG  Self reports 80 mg/dL - 100 mg/dL   Evening 170 mg/dL - 200 mg/dL   Complications:  Microvascular complications: none noted  Macrovascular complications: none noted  Medication cost: enrolled in PACENET  Current Diabetes Regimen:  Metformin 1000mg BID  Glimepiride 4mg BID  Ozempic 0.5 mg weekly     Changes to Medication Regimen:   If PCP is in agreement with plan  Discontinue Ozempic due to GI related side effects. Can try a different GLP-1 agonist. Trulicity is less potent than Ozempic so she may tolerate better. We did discuss SGLT-2 inhibitor class such as Jardiance but patient hesitant due to baseline hx of yeast infection/ UTI.   Rx pended to start Trulicity 0.75 mg weekly     2. Need for Additional Therapies:  Statin: not currently on - consider moderate statin therapy  ACEI/ARB: on lisinopril 10mg daily  ASA: not indicated    3. Health Maintenance:  Vaccine recommendations: per pcp  Eye Exam: utd  Foot Exam: utd  Urine Microalbumin: utd    4. Hyperlipidemia without clinical ASCVD event:   2018 ACC/AHA lipid guidelines recommend moderate intensity statin.   Patient currently not taking statin    Lipid panel/LFTs acceptable.  Discuss with patient at follow up appt    6. HTN:   BP goal less than 130/80 mmHg.   In office BP below goal, HR acceptable.     Monitoring: FBG daily    Referrals placed at this visit: None- SW referral already placed and working with patient    Follow-up: 3 weeks    Subjective     Medication Adherence/ Tolerability:  Metformin 1000 mg BID  Glimepiride 4 mg BID  Ozempic 0.5 mg weekly - intolerable GI side effects. Nausea, diarrhea, stomach upset.     Medications Tried in Past:  NPH  Ozempic    2. Lifestyle:   Last visit with dietician: n/a  Previously attended [facility] with Diabetes Class: no  Diet Recall:   Using equal packets instead of sugar.   Does admit that diet could be better controlled   Physical Activity: limited    3. Home monitoring devices  Glucometer: Yes, Brand: unknown  CGM: No, Brand: n/a  BP monitor: No, Brand: n/a      Objective     Lab Results   Component Value Date    HGBA1C 6.6 (A) 12/05/2023    HGBA1C 10.5 (A) 08/01/2023    HGBA1C 11.3 (A) 05/04/2023       Blood Glucose Readings  The patient is currently checking blood glucose 2 times per day. Patient reports with SMBG logs.    Date AM Post-Dinner Comments   11/4  202 Ozempic first dose   11/5 88 112 88 middle of night   11/6 111 140    11/7  211    11/8 106 190    11/9 98 288 chinese   11/10 -- 112    11/11 110 245    11/12 94 175    11/13 87 189    11/14 72 219    11/15 93 175    11/16 91 251    11/17 100 203    11/18 96 191    11/19 97     Avg 96 193        ASCVD Risk:  The ASCVD Risk score (Mynor DK, et al., 2019) failed to calculate for the following reasons:    Cannot find a previous HDL lab    Cannot find a previous total cholesterol lab     Vitals:  BP Readings from Last 3 Encounters:   12/05/23 130/72   10/20/23 124/72   08/01/23 132/82     Resp Readings from Last 3 Encounters:   12/05/23 18   10/20/23 18   08/01/23 14     Wt Readings from Last 3 Encounters:   12/05/23 92.4 kg (203 lb 12.8 oz)    10/20/23 95.4 kg (210 lb 6.4 oz)   08/01/23 98.6 kg (217 lb 6.4 oz)     Labs:  Lab Results   Component Value Date    SODIUM 137 06/29/2023    K 4.9 06/29/2023    EGFR 63 06/29/2023    CREATININE 1.00 06/29/2023    [id number] 819 09/12/2019    MICROALBCRE 115 (H) 05/04/2023     Pharmacist Tracking Tool     Pharmacist Tracking Tool  Reason For Outreach: Embedded Pharmacist  Demographics:  Intervention Method: Phone  Type of Intervention: Follow-Up  Topics Addressed: Diabetes  Pharmacologic Interventions: Medication Initiation and Medication Discontinuation  Non-Pharmacologic Interventions: Disease state education, Home Monitoring, and Medication/Device education  Time:  Direct Patient Care:  20  mins  Care Coordination:  10  mins  Recommendation Recipient: Patient/Caregiver and Provider  Outcome: Accepted

## 2024-01-29 ENCOUNTER — RA CDI HCC (OUTPATIENT)
Dept: OTHER | Facility: HOSPITAL | Age: 64
End: 2024-01-29

## 2024-01-30 ENCOUNTER — CLINICAL SUPPORT (OUTPATIENT)
Dept: FAMILY MEDICINE CLINIC | Facility: CLINIC | Age: 64
End: 2024-01-30

## 2024-01-30 DIAGNOSIS — E11.65 TYPE 2 DIABETES MELLITUS WITH HYPERGLYCEMIA, WITHOUT LONG-TERM CURRENT USE OF INSULIN (HCC): ICD-10-CM

## 2024-01-30 RX ORDER — GLIMEPIRIDE 4 MG/1
2 TABLET ORAL 2 TIMES DAILY
Start: 2024-01-30

## 2024-01-30 NOTE — PROGRESS NOTES
[facility]  Shalini Corbin, Pharmacist    Gloria Mitchell is a 63 y.o. female who was referred to the pharmacist for diabetes education and management, referred by previous PCP Dr. Latisha Hidalgo DO .    Telemedicine consent  The patient was identified by name and date of birth. Gloria Mitchell was informed that this is a telemedicine visit and that the visit is being conducted through Telephone.  My office door was closed. No one else was in the room.  She acknowledged consent and understanding of privacy and security of the video platform. The patient has agreed to participate and understands they can discontinue the visit at any time.    Assessment/ Plan     Type 2 Diabetes:  Goal A1c <7 based on ADA/AACE/ACE guidelines   Most recent A1c at goal 6.6% (12/5/23)  Reported Home SMBG  Hypoglycemia events after lunch and overnight/ morning   Complications:  Microvascular complications: none noted  Macrovascular complications: none noted  Medication cost: enrolled in PACENET  Current Diabetes Regimen:  Metformin 1000mg BID  Glimepiride 4mg BID  Trulicity 0.75 mg weekly    Changes to Medication Regimen:   If PCP is in agreement with plan  Continue Trulicity 0.75 mg weekly. Tolerating well.   Decrease glimepiride 4 mg BID to 2 mg BID. Goal to stop sulfonylurea due to age. Rx pended for PCP.     2. Need for Additional Therapies:  Statin: not currently on - consider moderate statin therapy  ACEI/ARB: on lisinopril 10mg daily  ASA: not indicated      Monitoring: FBG daily    Follow-up: 2 weeks    Subjective     Medication Adherence/ Tolerability:  Metformin 1000 mg BID   Glimepiride 4 mg BID- Takes with Breakfast and dinner   Trulicity 0.75 mg once weekly- no side effects on this current dose. Denies stomach cramps, diarrhea, or bloating.     Medications Tried in Past:  NPH  Ozempic    2. Lifestyle:   Last visit with dietician: n/a  Previously attended Living Well with Diabetes Class:  no  Diet Recall:   Physical Activity: limited    3. Home monitoring devices  Glucometer: Yes, Brand: unknown  CGM: No, Brand: n/a  BP monitor: No, Brand: n/a      Objective     Lab Results   Component Value Date    HGBA1C 6.6 (A) 12/05/2023    HGBA1C 10.5 (A) 08/01/2023    HGBA1C 11.3 (A) 05/04/2023       Blood Glucose Readings  The patient is currently checking blood glucose 2 times per day. Patient reports with SMBG logs.    Date AM Post-Dinner  2 hours after Comments   1/13  167     69 161     91 178      85 Over night dropped to 62    88 120     99 195     78 142     96 153     50 144     77 91     70 196 Over night dropped to 59    60 107     77 219     70 206     104 174    1/29 70 201    Avg          ASCVD Risk:  The ASCVD Risk score (Mynor ANTONIO, et al., 2019) failed to calculate for the following reasons:    Cannot find a previous HDL lab    Cannot find a previous total cholesterol lab     Vitals:  BP Readings from Last 3 Encounters:   12/05/23 130/72   10/20/23 124/72   08/01/23 132/82     Resp Readings from Last 3 Encounters:   12/05/23 18   10/20/23 18   08/01/23 14     Wt Readings from Last 3 Encounters:   12/05/23 92.4 kg (203 lb 12.8 oz)   10/20/23 95.4 kg (210 lb 6.4 oz)   08/01/23 98.6 kg (217 lb 6.4 oz)     Labs:  Lab Results   Component Value Date    SODIUM 137 06/29/2023    K 4.9 06/29/2023    EGFR 63 06/29/2023    CREATININE 1.00 06/29/2023    [id number] 819 09/12/2019    MICROALBCRE 115 (H) 05/04/2023     Pharmacist Tracking Tool     Pharmacist Tracking Tool  Reason For Outreach: Embedded Pharmacist  Demographics:  Intervention Method: Phone  Type of Intervention: Follow-Up  Topics Addressed: Diabetes  Pharmacologic Interventions: Dose or Frequency Adjusted and Prevent or Manage CARLO  Non-Pharmacologic Interventions: Disease state education, Home Monitoring, and Medication/Device education  Time:  Direct Patient Care:  20  mins  Care Coordination:  10  mins  Recommendation Recipient:  Patient/Caregiver and Provider  Outcome: Accepted

## 2024-01-31 LAB
LEFT EYE DIABETIC RETINOPATHY: NORMAL
RIGHT EYE DIABETIC RETINOPATHY: NORMAL

## 2024-02-12 ENCOUNTER — RA CDI HCC (OUTPATIENT)
Dept: OTHER | Facility: HOSPITAL | Age: 64
End: 2024-02-12

## 2024-02-12 PROBLEM — E11.9 ENCOUNTER FOR DIABETIC FOOT EXAM (HCC): Status: RESOLVED | Noted: 2023-05-04 | Resolved: 2024-02-12

## 2024-02-12 PROBLEM — J30.2 SEASONAL ALLERGIES: Status: RESOLVED | Noted: 2017-12-07 | Resolved: 2024-02-12

## 2024-02-13 ENCOUNTER — CLINICAL SUPPORT (OUTPATIENT)
Dept: FAMILY MEDICINE CLINIC | Facility: CLINIC | Age: 64
End: 2024-02-13

## 2024-02-13 DIAGNOSIS — E11.65 TYPE 2 DIABETES MELLITUS WITH HYPERGLYCEMIA, WITHOUT LONG-TERM CURRENT USE OF INSULIN (HCC): Primary | ICD-10-CM

## 2024-02-13 NOTE — PROGRESS NOTES
[facility]  Shalini Corbin, Pharmacist    Gloria Mitchell is a 63 y.o. female who was referred to the pharmacist for diabetes education and management, referred by previous PCP Dr. Latisha Hidalgo DO .    Telemedicine consent  The patient was identified by name and date of birth. Gloria Mitchell was informed that this is a telemedicine visit and that the visit is being conducted through Telephone.  My office door was closed. No one else was in the room.  She acknowledged consent and understanding of privacy and security of the video platform. The patient has agreed to participate and understands they can discontinue the visit at any time.    Assessment/ Plan     Type 2 Diabetes:  Goal A1c <7 based on ADA/AACE/ACE guidelines   Most recent A1c at goal 6.6% (12/5/23)  Reported Home SMBG  Blood sugar readings well controlled. Fasting at goal. Post dinner mostly at goal.   Complications:  Microvascular complications: none noted  Macrovascular complications: none noted  Medication cost: enrolled in PACENET  Current Diabetes Regimen:  Metformin 1000mg BID  Glimepiride 2mg BID  Trulicity 0.75 mg weekly    Changes to Medication Regimen:   If PCP is in agreement with plan  No changes today.   Continue Trulicity 0.75 mg weekly. Tolerating well. Continue glimepiride 2 mg BID.     2. Need for Additional Therapies:  Statin: not currently on - consider moderate statin therapy  ACEI/ARB: on lisinopril 10mg daily  ASA: not indicated      Monitoring: FBG daily    Follow-up: 8 weeks     Subjective     Medication Adherence/ Tolerability:  Metformin 1000 mg BID   Glimepiride 2 mg BID- Takes with Breakfast and dinner   Trulicity 0.75 mg once weekly- no side effects on this current dose. Denies stomach cramps, diarrhea, or bloating.     Medications Tried in Past:  NPH  Ozempic    2. Lifestyle:   Last visit with dietician: n/a  Previously attended Living Well with Diabetes Class: no  Diet Recall:    Physical Activity: limited    3. Home monitoring devices  Glucometer: Yes, Brand: unknown  CGM: No, Brand: n/a  BP monitor: No, Brand: n/a      Objective     Lab Results   Component Value Date    HGBA1C 6.6 (A) 12/05/2023    HGBA1C 10.5 (A) 08/01/2023    HGBA1C 11.3 (A) 05/04/2023       Blood Glucose Readings  The patient is currently checking blood glucose 2 times per day. Patient reports with SMBG logs.    Date AM Post-Dinner  2 hours after Comments   1/29 70 201     85 195 Decreased to half dose of ASH    104 245    2/1 117 187    2/2 85 136     72 153     126 160     129 177     86 232     80      132 315 Ate chinese     74 197     69 130     96 140    2/12  202          Avg 95 190        ASCVD Risk:  The ASCVD Risk score (Mynor ANTONIO, et al., 2019) failed to calculate for the following reasons:    Cannot find a previous HDL lab    Cannot find a previous total cholesterol lab     Vitals:  BP Readings from Last 3 Encounters:   12/05/23 130/72   10/20/23 124/72   08/01/23 132/82     Resp Readings from Last 3 Encounters:   12/05/23 18   10/20/23 18   08/01/23 14     Wt Readings from Last 3 Encounters:   12/05/23 92.4 kg (203 lb 12.8 oz)   10/20/23 95.4 kg (210 lb 6.4 oz)   08/01/23 98.6 kg (217 lb 6.4 oz)     Labs:  Lab Results   Component Value Date    SODIUM 137 06/29/2023    K 4.9 06/29/2023    EGFR 63 06/29/2023    CREATININE 1.00 06/29/2023    [id number] 819 09/12/2019    MICROALBCRE 115 (H) 05/04/2023     Pharmacist Tracking Tool     Pharmacist Tracking Tool  Reason For Outreach: Embedded Pharmacist  Demographics:  Intervention Method: Phone  Type of Intervention: Follow-Up  Topics Addressed: Diabetes  Pharmacologic Interventions: N/A  Non-Pharmacologic Interventions: Disease state education, Home Monitoring, and Medication/Device education  Time:  Direct Patient Care:  20  mins  Care Coordination:  10  mins  Recommendation Recipient: Patient/Caregiver and Provider  Outcome: Accepted

## 2024-02-19 DIAGNOSIS — M79.641 PAIN IN BOTH HANDS: ICD-10-CM

## 2024-02-19 DIAGNOSIS — M79.642 PAIN IN BOTH HANDS: ICD-10-CM

## 2024-02-19 DIAGNOSIS — M13.0 POLYARTICULAR ARTHRITIS: ICD-10-CM

## 2024-02-19 RX ORDER — MELOXICAM 15 MG/1
15 TABLET ORAL DAILY
Qty: 90 TABLET | Refills: 0 | Status: SHIPPED | OUTPATIENT
Start: 2024-02-19

## 2024-03-04 ENCOUNTER — TELEPHONE (OUTPATIENT)
Age: 64
End: 2024-03-04

## 2024-03-04 NOTE — TELEPHONE ENCOUNTER
Pt has follow up appt on 3/7 and is unable to have labs done prior. She would like to know if she should reschedule or continue with appt and then have labs done after. Please advise, thank you.

## 2024-03-07 ENCOUNTER — OFFICE VISIT (OUTPATIENT)
Dept: FAMILY MEDICINE CLINIC | Facility: CLINIC | Age: 64
End: 2024-03-07
Payer: COMMERCIAL

## 2024-03-07 VITALS
HEIGHT: 60 IN | DIASTOLIC BLOOD PRESSURE: 78 MMHG | TEMPERATURE: 96.6 F | BODY MASS INDEX: 38.32 KG/M2 | WEIGHT: 195.2 LBS | HEART RATE: 78 BPM | SYSTOLIC BLOOD PRESSURE: 132 MMHG | OXYGEN SATURATION: 98 %

## 2024-03-07 DIAGNOSIS — M13.0 POLYARTICULAR ARTHRITIS: ICD-10-CM

## 2024-03-07 DIAGNOSIS — E66.01 MORBID OBESITY WITH BMI OF 40.0-44.9, ADULT (HCC): ICD-10-CM

## 2024-03-07 DIAGNOSIS — Z79.4 TYPE 2 DIABETES MELLITUS WITH OTHER SPECIFIED COMPLICATION, WITH LONG-TERM CURRENT USE OF INSULIN (HCC): Primary | ICD-10-CM

## 2024-03-07 DIAGNOSIS — M79.671 RIGHT FOOT PAIN: ICD-10-CM

## 2024-03-07 DIAGNOSIS — E11.69 TYPE 2 DIABETES MELLITUS WITH OTHER SPECIFIED COMPLICATION, WITH LONG-TERM CURRENT USE OF INSULIN (HCC): Primary | ICD-10-CM

## 2024-03-07 DIAGNOSIS — I10 HYPERTENSION, ESSENTIAL: ICD-10-CM

## 2024-03-07 DIAGNOSIS — E78.2 MIXED HYPERLIPIDEMIA: ICD-10-CM

## 2024-03-07 LAB — SL AMB POCT HEMOGLOBIN AIC: 6.1 (ref ?–6.5)

## 2024-03-07 PROCEDURE — 83036 HEMOGLOBIN GLYCOSYLATED A1C: CPT

## 2024-03-07 PROCEDURE — 99214 OFFICE O/P EST MOD 30 MIN: CPT

## 2024-03-07 NOTE — ASSESSMENT & PLAN NOTE
A1c is 6.1 today.  Patient is praised for this.  Patient is also lost 8 pounds since last visit.  Patient is to continue healthy diet and adequate exercise.  Is also to continue Trulicity 0.75 mg weekly, glimepiride 2 mg 2 times daily, and metformin 1000 mg twice a day.  Patient is encouraged to get lab work that was placed at last visit to monitor cholesterol.  Lab Results   Component Value Date    HGBA1C 6.1 03/07/2024

## 2024-03-07 NOTE — ASSESSMENT & PLAN NOTE
Patient has lost 8 pounds since last visit.  Patient is praised for his.  Continue healthy diet and adequate activity.

## 2024-03-07 NOTE — PROGRESS NOTES
Name: Gloria Mitchell      : 1960      MRN: [id number]  Encounter Provider: Rito Méndez PA-C  Encounter Date: 3/7/2024   Encounter department: [facility]    Assessment & Plan     1. Type 2 diabetes mellitus with other specified complication, with long-term current use of insulin (HCC)  Assessment & Plan:  A1c is 6.1 today.  Patient is praised for this.  Patient is also lost 8 pounds since last visit.  Patient is to continue healthy diet and adequate exercise.  Is also to continue Trulicity 0.75 mg weekly, glimepiride 2 mg 2 times daily, and metformin 1000 mg twice a day.  Patient is encouraged to get lab work that was placed at last visit to monitor cholesterol.  Lab Results   Component Value Date    HGBA1C 6.1 2024       Orders:  -     POCT hemoglobin A1c  -     Ambulatory Referral to Podiatry; Future  -     XR foot 3+ vw right; Future; Expected date: 2024    2. Hypertension, essential  Assessment & Plan:  At goal today.  Continue lisinopril 10 mg daily.      3. Polyarticular arthritis  Assessment & Plan:  Stable.  Continue Mobic 15 mg daily as needed.  Will monitor kidney function.      4. Mixed hyperlipidemia  Assessment & Plan:  Patient encouraged to get labs that were placed at last visit to continue monitoring cholesterol.  Will follow-up pending her lab results.      5. Morbid obesity with BMI of 40.0-44.9, adult ([facility])  Assessment & Plan:  Patient has lost 8 pounds since last visit.  Patient is praised for his.  Continue healthy diet and adequate activity.      6. Right foot pain  Assessment & Plan:  Placed x-ray of right foot and referred to podiatry.    Orders:  -     Ambulatory Referral to Podiatry; Future  -     XR foot 3+ vw right; Future; Expected date: 2024           Subjective      Patient is a 63-year-old female presenting for [facility] follow-up.  Patient complains of foot pain for 1 month.    Leg Pain   There was no injury mechanism. The pain is  present in the right foot (Top of right foot into 3rd toe.). The quality of the pain is described as aching. The pain is at a severity of 10/10. The pain is severe. The pain has been Fluctuating since onset. Pertinent negatives include no inability to bear weight, loss of motion, loss of sensation, muscle weakness, numbness or tingling. She reports no foreign bodies present. The symptoms are aggravated by movement and weight bearing. She has tried acetaminophen (Mobic.) for the symptoms. The treatment provided mild relief.     Review of Systems   Constitutional:  Negative for appetite change, chills, diaphoresis, fatigue and fever.   HENT:  Negative for congestion, ear discharge, ear pain, postnasal drip, rhinorrhea, sinus pressure, sinus pain, sneezing and sore throat.    Eyes:  Negative for pain, discharge, redness, itching and visual disturbance.   Respiratory:  Negative for apnea, cough, chest tightness, shortness of breath and wheezing.    Cardiovascular:  Negative for chest pain, palpitations and leg swelling.   Gastrointestinal:  Negative for abdominal pain, blood in stool, constipation, diarrhea, nausea and vomiting.   Endocrine: Negative for cold intolerance, heat intolerance, polydipsia and polyuria.   Genitourinary:  Negative for dysuria, flank pain, frequency, hematuria and urgency.   Musculoskeletal:  Negative for arthralgias, back pain, myalgias, neck pain and neck stiffness.        Foot pain.   Skin:  Negative for color change and rash.   Allergic/Immunologic: Negative for environmental allergies and food allergies.   Neurological:  Negative for dizziness, tingling, tremors, seizures, syncope, speech difficulty, weakness, light-headedness, numbness and headaches.   Hematological:  Negative for adenopathy. Does not bruise/bleed easily.   Psychiatric/Behavioral:  Negative for agitation, confusion, decreased concentration, dysphoric mood, hallucinations, self-injury, sleep disturbance and suicidal ideas.  The patient is not nervous/anxious and is not hyperactive.    All other systems reviewed and are negative.      Current Outpatient Medications on File Prior to Visit   Medication Sig    dulaglutide (Trulicity) 0.75 MG/0.5ML injection Inject 0.5 mL (0.75 mg total) under the skin every 7 days    glimepiride (AMARYL) 4 mg tablet Take 0.5 tablets (2 mg total) by mouth 2 (two) times a day    lisinopril (ZESTRIL) 10 mg tablet Take 1 tablet (10 mg total) by mouth daily    meloxicam (MOBIC) 15 mg tablet Take 1 tablet by mouth once daily    metFORMIN (GLUCOPHAGE) 1000 MG tablet Take 1 tablet (1,000 mg total) by mouth every 12 (twelve) hours    multivitamin (THERAGRAN) TABS Take 1 tablet by mouth daily    Vitamin C 250 MG TABS Take by mouth daily       Objective     /78 (BP Location: Left arm, Patient Position: Sitting)   Pulse 78   Temp (!) 96.6 °F (35.9 °C) (Tympanic)   Ht 5' (1.524 m)   Wt 88.5 kg (195 lb 3.2 oz)   SpO2 98%   BMI 38.12 kg/m²     Physical Exam  Vitals and nursing note reviewed.   Constitutional:       Appearance: Normal appearance. She is obese. She is not ill-appearing or toxic-appearing.   HENT:      Head: Normocephalic and atraumatic.      Right Ear: Tympanic membrane normal.      Left Ear: Tympanic membrane normal.      Nose: Nose normal.      Mouth/Throat:      Mouth: Mucous membranes are moist.      Pharynx: Oropharynx is clear.   Eyes:      Extraocular Movements: Extraocular movements intact.      Conjunctiva/sclera: Conjunctivae normal.      Pupils: Pupils are equal, round, and reactive to light.   Cardiovascular:      Rate and Rhythm: Normal rate and regular rhythm.      Pulses: Normal pulses.      Heart sounds: Normal heart sounds. No murmur heard.  Pulmonary:      Effort: Pulmonary effort is normal. No respiratory distress.      Breath sounds: Normal breath sounds. No wheezing.   Chest:      Chest wall: No tenderness.   Abdominal:      General: Bowel sounds are normal.       Palpations: Abdomen is soft. There is no mass.      Tenderness: There is no abdominal tenderness.   Musculoskeletal:         General: Tenderness (To palpation and weight bearing of dorsal side of right foot.) present. Normal range of motion.      Cervical back: Normal range of motion and neck supple. No tenderness.      Right lower leg: No edema.      Left lower leg: No edema.   Lymphadenopathy:      Cervical: No cervical adenopathy.   Skin:     General: Skin is warm.      Capillary Refill: Capillary refill takes less than 2 seconds.      Findings: No erythema, lesion or rash.   Neurological:      General: No focal deficit present.      Mental Status: She is alert and oriented to person, place, and time. Mental status is at baseline.      Motor: No weakness.      Gait: Gait normal.   Psychiatric:         Mood and Affect: Mood normal.         Behavior: Behavior normal.         Thought Content: Thought content normal.         Judgment: Judgment normal.       Rito Méndez PA-C

## 2024-03-07 NOTE — ASSESSMENT & PLAN NOTE
Patient encouraged to get labs that were placed at last visit to continue monitoring cholesterol.  Will follow-up pending her lab results.

## 2024-03-30 ENCOUNTER — HOSPITAL ENCOUNTER (EMERGENCY)
Facility: HOSPITAL | Age: 64
Discharge: HOME | End: 2024-03-31
Attending: EMERGENCY MEDICINE
Payer: COMMERCIAL

## 2024-03-30 ENCOUNTER — APPOINTMENT (EMERGENCY)
Dept: RADIOLOGY | Facility: HOSPITAL | Age: 64
End: 2024-03-30
Payer: COMMERCIAL

## 2024-03-30 ENCOUNTER — APPOINTMENT (EMERGENCY)
Dept: CT IMAGING | Facility: HOSPITAL | Age: 64
End: 2024-03-30
Payer: COMMERCIAL

## 2024-03-30 DIAGNOSIS — R19.7 DIARRHEA: ICD-10-CM

## 2024-03-30 DIAGNOSIS — R10.84 GENERALIZED ABDOMINAL PAIN: ICD-10-CM

## 2024-03-30 DIAGNOSIS — N39.0 UTI (URINARY TRACT INFECTION): ICD-10-CM

## 2024-03-30 DIAGNOSIS — R11.2 NAUSEA AND VOMITING: ICD-10-CM

## 2024-03-30 DIAGNOSIS — R00.2 PALPITATIONS: ICD-10-CM

## 2024-03-30 DIAGNOSIS — K52.9 ENTEROCOLITIS: Primary | ICD-10-CM

## 2024-03-30 LAB
ALBUMIN SERPL BCP-MCNC: 4.2 G/DL (ref 3.5–5)
ALP SERPL-CCNC: 37 U/L (ref 34–104)
ALT SERPL W P-5'-P-CCNC: 17 U/L (ref 7–52)
ANION GAP SERPL CALCULATED.3IONS-SCNC: 10 MMOL/L (ref 4–13)
AST SERPL W P-5'-P-CCNC: 14 U/L (ref 13–39)
BACTERIA UR QL AUTO: ABNORMAL /HPF
BASOPHILS # BLD AUTO: 0.03 THOUSANDS/ÂΜL (ref 0–0.1)
BASOPHILS NFR BLD AUTO: 0 % (ref 0–1)
BILIRUB SERPL-MCNC: 0.61 MG/DL (ref 0.2–1)
BILIRUB UR QL STRIP: NEGATIVE
BUN SERPL-MCNC: 39 MG/DL (ref 5–25)
CALCIUM SERPL-MCNC: 9.3 MG/DL (ref 8.4–10.2)
CARDIAC TROPONIN I PNL SERPL HS: 3 NG/L
CHLORIDE SERPL-SCNC: 101 MMOL/L (ref 96–108)
CLARITY UR: CLEAR
CO2 SERPL-SCNC: 23 MMOL/L (ref 21–32)
COLOR UR: YELLOW
CREAT SERPL-MCNC: 1.04 MG/DL (ref 0.6–1.3)
D DIMER PPP FEU-MCNC: 1.89 UG/ML FEU
EOSINOPHIL # BLD AUTO: 0.14 THOUSAND/ÂΜL (ref 0–0.61)
EOSINOPHIL NFR BLD AUTO: 1 % (ref 0–6)
ERYTHROCYTE [DISTWIDTH] IN BLOOD BY AUTOMATED COUNT: 13.1 % (ref 11.6–15.1)
FLUAV RNA RESP QL NAA+PROBE: NEGATIVE
FLUBV RNA RESP QL NAA+PROBE: NEGATIVE
GFR SERPL CREATININE-BSD FRML MDRD: 56 ML/MIN/1.73SQ M
GLUCOSE SERPL-MCNC: 181 MG/DL (ref 65–140)
GLUCOSE UR STRIP-MCNC: NEGATIVE MG/DL
HCT VFR BLD AUTO: 38.4 % (ref 34.8–46.1)
HGB BLD-MCNC: 12.5 G/DL (ref 11.5–15.4)
HGB UR QL STRIP.AUTO: NEGATIVE
HYALINE CASTS #/AREA URNS LPF: ABNORMAL /LPF
IMM GRANULOCYTES # BLD AUTO: 0.04 THOUSAND/UL (ref 0–0.2)
IMM GRANULOCYTES NFR BLD AUTO: 0 % (ref 0–2)
KETONES UR STRIP-MCNC: ABNORMAL MG/DL
LEUKOCYTE ESTERASE UR QL STRIP: ABNORMAL
LIPASE SERPL-CCNC: 39 U/L (ref 11–82)
LYMPHOCYTES # BLD AUTO: 0.96 THOUSANDS/ÂΜL (ref 0.6–4.47)
LYMPHOCYTES NFR BLD AUTO: 8 % (ref 14–44)
MCH RBC QN AUTO: 29.3 PG (ref 26.8–34.3)
MCHC RBC AUTO-ENTMCNC: 32.6 G/DL (ref 31.4–37.4)
MCV RBC AUTO: 90 FL (ref 82–98)
MONOCYTES # BLD AUTO: 0.95 THOUSAND/ÂΜL (ref 0.17–1.22)
MONOCYTES NFR BLD AUTO: 8 % (ref 4–12)
MUCOUS THREADS UR QL AUTO: ABNORMAL
NEUTROPHILS # BLD AUTO: 9.62 THOUSANDS/ÂΜL (ref 1.85–7.62)
NEUTS SEG NFR BLD AUTO: 83 % (ref 43–75)
NITRITE UR QL STRIP: NEGATIVE
NON-SQ EPI CELLS URNS QL MICRO: ABNORMAL /HPF
NRBC BLD AUTO-RTO: 0 /100 WBCS
PH UR STRIP.AUTO: 6.5 [PH]
PLATELET # BLD AUTO: 207 THOUSANDS/UL (ref 149–390)
PMV BLD AUTO: 10.5 FL (ref 8.9–12.7)
POTASSIUM SERPL-SCNC: 4.6 MMOL/L (ref 3.5–5.3)
PROT SERPL-MCNC: 7 G/DL (ref 6.4–8.4)
PROT UR STRIP-MCNC: NEGATIVE MG/DL
RBC # BLD AUTO: 4.26 MILLION/UL (ref 3.81–5.12)
RBC #/AREA URNS AUTO: ABNORMAL /HPF
RSV RNA RESP QL NAA+PROBE: NEGATIVE
SARS-COV-2 RNA RESP QL NAA+PROBE: NEGATIVE
SODIUM SERPL-SCNC: 134 MMOL/L (ref 135–147)
SP GR UR STRIP.AUTO: 1.01 (ref 1–1.03)
UROBILINOGEN UR QL STRIP.AUTO: 0.2 E.U./DL
WBC # BLD AUTO: 11.74 THOUSAND/UL (ref 4.31–10.16)
WBC #/AREA URNS AUTO: ABNORMAL /HPF

## 2024-03-30 PROCEDURE — 71275 CT ANGIOGRAPHY CHEST: CPT

## 2024-03-30 PROCEDURE — 87086 URINE CULTURE/COLONY COUNT: CPT | Performed by: EMERGENCY MEDICINE

## 2024-03-30 PROCEDURE — 74177 CT ABD & PELVIS W/CONTRAST: CPT

## 2024-03-30 PROCEDURE — 93005 ELECTROCARDIOGRAM TRACING: CPT

## 2024-03-30 PROCEDURE — 85025 COMPLETE CBC W/AUTO DIFF WBC: CPT | Performed by: EMERGENCY MEDICINE

## 2024-03-30 PROCEDURE — 99284 EMERGENCY DEPT VISIT MOD MDM: CPT

## 2024-03-30 PROCEDURE — 83690 ASSAY OF LIPASE: CPT | Performed by: EMERGENCY MEDICINE

## 2024-03-30 PROCEDURE — 99285 EMERGENCY DEPT VISIT HI MDM: CPT | Performed by: EMERGENCY MEDICINE

## 2024-03-30 PROCEDURE — 85379 FIBRIN DEGRADATION QUANT: CPT | Performed by: EMERGENCY MEDICINE

## 2024-03-30 PROCEDURE — 80053 COMPREHEN METABOLIC PANEL: CPT | Performed by: EMERGENCY MEDICINE

## 2024-03-30 PROCEDURE — 84484 ASSAY OF TROPONIN QUANT: CPT | Performed by: EMERGENCY MEDICINE

## 2024-03-30 PROCEDURE — 71045 X-RAY EXAM CHEST 1 VIEW: CPT

## 2024-03-30 PROCEDURE — 81001 URINALYSIS AUTO W/SCOPE: CPT | Performed by: EMERGENCY MEDICINE

## 2024-03-30 PROCEDURE — 0241U HB NFCT DS VIR RESP RNA 4 TRGT: CPT | Performed by: EMERGENCY MEDICINE

## 2024-03-30 PROCEDURE — 36415 COLL VENOUS BLD VENIPUNCTURE: CPT | Performed by: EMERGENCY MEDICINE

## 2024-03-30 RX ADMIN — IOHEXOL 94 ML: 350 INJECTION, SOLUTION INTRAVENOUS at 23:41

## 2024-03-30 NOTE — Clinical Note
Gloria Mitchell was seen and treated in our emergency department on 3/30/2024.                Diagnosis:     Gloria  may return to work on return date.    She may return on this date: 04/01/2024         If you have any questions or concerns, please don't hesitate to call.      Bimal Schuster MD    ______________________________           _______________          _______________  Hospital Representative                              Date                                Time

## 2024-03-31 VITALS
WEIGHT: 202.6 LBS | HEART RATE: 83 BPM | OXYGEN SATURATION: 97 % | SYSTOLIC BLOOD PRESSURE: 103 MMHG | BODY MASS INDEX: 39.78 KG/M2 | RESPIRATION RATE: 21 BRPM | DIASTOLIC BLOOD PRESSURE: 56 MMHG | TEMPERATURE: 97.3 F | HEIGHT: 60 IN

## 2024-03-31 RX ORDER — CIPROFLOXACIN 500 MG/1
500 TABLET, FILM COATED ORAL 2 TIMES DAILY
Qty: 14 TABLET | Refills: 0 | Status: SHIPPED | OUTPATIENT
Start: 2024-03-31 | End: 2024-04-07

## 2024-03-31 RX ORDER — CIPROFLOXACIN 500 MG/1
500 TABLET, FILM COATED ORAL ONCE
Status: COMPLETED | OUTPATIENT
Start: 2024-03-31 | End: 2024-03-31

## 2024-03-31 RX ADMIN — CIPROFLOXACIN 500 MG: 500 TABLET, FILM COATED ORAL at 01:58

## 2024-03-31 NOTE — ED PROVIDER NOTES
History  Chief Complaint   Patient presents with    Nausea     Pt coming in for throat tightness, nausea, vomited, hot flashes, stomach cramps, diarrhea, dizziness and weakness. All started Friday at 3pm. Pt has sugar management issues and checked BS was 99 prior to EMS arrival       History provided by:  Medical records and patient  Shortness of Breath      Prior to Admission Medications   Prescriptions Last Dose Informant Patient Reported? Taking?   Vitamin C 250 MG TABS   Yes No   Sig: Take by mouth daily   dulaglutide (Trulicity) 0.75 MG/0.5ML injection   No No   Sig: Inject 0.5 mL (0.75 mg total) under the skin every 7 days   glimepiride (AMARYL) 4 mg tablet   No No   Sig: Take 0.5 tablets (2 mg total) by mouth 2 (two) times a day   lisinopril (ZESTRIL) 10 mg tablet   No No   Sig: Take 1 tablet (10 mg total) by mouth daily   meloxicam (MOBIC) 15 mg tablet   No No   Sig: Take 1 tablet by mouth once daily   metFORMIN (GLUCOPHAGE) 1000 MG tablet   No No   Sig: Take 1 tablet (1,000 mg total) by mouth every 12 (twelve) hours   multivitamin (THERAGRAN) TABS   Yes No   Sig: Take 1 tablet by mouth daily      Facility-Administered Medications: None       Past Medical History:   Diagnosis Date    Arthritis     Bursitis of both feet     Carpal tunnel syndrome, bilateral     Diabetes mellitus (HCC)     Hypertension        Past Surgical History:   Procedure Laterality Date    WISDOM TOOTH EXTRACTION         Family History   Problem Relation Age of Onset    Heart disease Mother         Cardiac Disorder    Diabetes Mother         Mellitus    Cancer Mother     Heart disease Father         Cardiac Disorder    Cancer Father     Diabetes Sister         Mellitus    Cancer Brother     Diabetes Family         Mellitus     I have reviewed and agree with the history as documented.    E-Cigarette/Vaping    E-Cigarette Use Never User      E-Cigarette/Vaping Substances    Nicotine No     THC No     CBD No     Flavoring No     Other No      Unknown No      Social History     Tobacco Use    Smoking status: Never    Smokeless tobacco: Never   Vaping Use    Vaping status: Never Used   Substance Use Topics    Alcohol use: No    Drug use: No       Review of Systems   Respiratory:  Positive for shortness of breath.        Physical Exam  Physical Exam    Vital Signs  ED Triage Vitals [03/30/24 2201]   Temperature Pulse Respirations Blood Pressure SpO2   (!) 97.3 °F (36.3 °C) 97 18 132/67 100 %      Temp Source Heart Rate Source Patient Position - Orthostatic VS BP Location FiO2 (%)   Temporal Monitor Sitting Left arm --      Pain Score       3           Vitals:    03/30/24 2201 03/30/24 2315 03/31/24 0137   BP: 132/67 94/55 103/56   Pulse: 97 87    Patient Position - Orthostatic VS: Sitting Sitting          Visual Acuity      ED Medications  Medications   ciprofloxacin (CIPRO) tablet 500 mg (has no administration in time range)   iohexol (OMNIPAQUE) 350 MG/ML injection (SINGLE-DOSE) 94 mL (94 mL Intravenous Given 3/30/24 2341)       Diagnostic Studies  Results Reviewed       Procedure Component Value Units Date/Time    FLU/RSV/COVID - if FLU/RSV clinically relevant [[id number]]  (Normal) Collected: 03/30/24 2247    Lab Status: Final result Specimen: Nares from Nasopharyngeal Swab Updated: 03/30/24 2334     SARS-CoV-2 Negative     INFLUENZA A PCR Negative     INFLUENZA B PCR Negative     RSV PCR Negative    Narrative:      FOR PEDIATRIC PATIENTS - copy/paste COVID Guidelines URL to browser: [email or web address]    SARS-CoV-2 assay is a Nucleic Acid Amplification assay intended for the  qualitative detection of nucleic acid from SARS-CoV-2 in nasopharyngeal  swabs. Results are for the presumptive identification of SARS-CoV-2 RNA.    Positive results are indicative of infection with SARS-CoV-2, the virus  causing COVID-19, but do not rule out bacterial infection or co-infection  with other viruses.  Laboratories within the [place] and its  territories are required to report all positive results to the appropriate  public health authorities. Negative results do not preclude SARS-CoV-2  infection and should not be used as the sole basis for treatment or other  patient management decisions. Negative results must be combined with  clinical observations, patient history, and epidemiological information.  This test has not been FDA cleared or approved.    This test has been authorized by FDA under an Emergency Use Authorization  (EUA). This test is only authorized for the duration of time the  declaration that circumstances exist justifying the authorization of the  emergency use of an in vitro diagnostic tests for detection of SARS-CoV-2  virus and/or diagnosis of COVID-19 infection under section 564(b)(1) of  the Act, 21 U.S.C. 360bbb-3(b)(1), unless the authorization is terminated  or revoked sooner. The test has been validated but independent review by FDA  and CLIA is pending.    Test performed using [employer] GeneXpert: This RT-PCR assay targets N2,  a region unique to SARS-CoV-2. A conserved region in the E-gene was chosen  for pan-Sarbecovirus detection which includes SARS-CoV-2.    According to CMS-2020-01-R, this platform meets the definition of high-throughput technology.    Urine Microscopic [[id number]]  (Abnormal) Collected: 03/30/24 2247    Lab Status: Final result Specimen: Urine, Clean Catch Updated: 03/30/24 2310     RBC, UA None Seen /hpf      WBC, UA 10-20 /hpf      Epithelial Cells Occasional /hpf      Bacteria, UA Occasional /hpf      Hyaline Casts, UA 2-4 /lpf      MUCUS THREADS Occasional    Urine culture [[id number]] Collected: 03/30/24 2247    Lab Status: In process Specimen: Urine, Clean Catch Updated: 03/30/24 2309    UA w Reflex to Microscopic w Reflex to Culture [[id number]]  (Abnormal) Collected: 03/30/24 2247    Lab Status: Final result Specimen: Urine, Clean Catch Updated: 03/30/24  2256     Color, UA Yellow     Clarity, UA Clear     Specific Gravity, UA 1.010     pH, UA 6.5     Leukocytes, UA Small     Nitrite, UA Negative     Protein, UA Negative mg/dl      Glucose, UA Negative mg/dl      Ketones, UA 15 (1+) mg/dl      Urobilinogen, UA 0.2 E.U./dl      Bilirubin, UA Negative     Occult Blood, UA Negative    D-Dimer [[id number]]  (Abnormal) Collected: 03/30/24 2209    Lab Status: Final result Specimen: Blood from Arm, Right Updated: 03/30/24 2250     D-Dimer, Quant 1.89 ug/ml FEU     Narrative:      In the evaluation for possible pulmonary embolism, in the appropriate (Well's Score of 4 or less) patient, the age adjusted d-dimer cutoff for this patient can be calculated as:    Age x 0.01 (in ug/mL) for Age-adjusted D-dimer exclusion threshold for a patient over 50 years.    HS Troponin 0hr (reflex protocol) [[id number]]  (Normal) Collected: 03/30/24 2209    Lab Status: Final result Specimen: Blood from Arm, Right Updated: 03/30/24 2242     hs TnI 0hr 3 ng/L     Comprehensive metabolic panel [[id number]]  (Abnormal) Collected: 03/30/24 2209    Lab Status: Final result Specimen: Blood from Arm, Right Updated: 03/30/24 2238     Sodium 134 mmol/L      Potassium 4.6 mmol/L      Chloride 101 mmol/L      CO2 23 mmol/L      ANION GAP 10 mmol/L      BUN 39 mg/dL      Creatinine 1.04 mg/dL      Glucose 181 mg/dL      Calcium 9.3 mg/dL      AST 14 U/L      ALT 17 U/L      Alkaline Phosphatase 37 U/L      Total Protein 7.0 g/dL      Albumin 4.2 g/dL      Total Bilirubin 0.61 mg/dL      eGFR 56 ml/min/1.73sq m     Narrative:      National Kidney Disease Foundation guidelines for Chronic Kidney Disease (CKD):     Stage 1 with normal or high GFR (GFR > 90 mL/min/1.73 square meters)    Stage 2 Mild CKD (GFR = 60-89 mL/min/1.73 square meters)    Stage 3A Moderate CKD (GFR = 45-59 mL/min/1.73 square meters)    Stage 3B Moderate CKD (GFR = 30-44 mL/min/1.73 square meters)    Stage 4 Severe CKD (GFR = 15-29  mL/min/1.73 square meters)    Stage 5 End Stage CKD (GFR <15 mL/min/1.73 square meters)  Note: GFR calculation is accurate only with a steady state creatinine    Lipase [[id number]]  (Normal) Collected: 03/30/24 2209    Lab Status: Final result Specimen: Blood from Arm, Right Updated: 03/30/24 2238     Lipase 39 u/L     CBC and differential [[id number]]  (Abnormal) Collected: 03/30/24 2209    Lab Status: Final result Specimen: Blood from Arm, Right Updated: 03/30/24 2216     WBC 11.74 Thousand/uL      RBC 4.26 Million/uL      Hemoglobin 12.5 g/dL      Hematocrit 38.4 %      MCV 90 fL      MCH 29.3 pg      MCHC 32.6 g/dL      RDW 13.1 %      MPV 10.5 fL      Platelets 207 Thousands/uL      nRBC 0 /100 WBCs      Neutrophils Relative 83 %      Immature Grans % 0 %      Lymphocytes Relative 8 %      Monocytes Relative 8 %      Eosinophils Relative 1 %      Basophils Relative 0 %      Neutrophils Absolute 9.62 Thousands/µL      Absolute Immature Grans 0.04 Thousand/uL      Absolute Lymphocytes 0.96 Thousands/µL      Absolute Monocytes 0.95 Thousand/µL      Eosinophils Absolute 0.14 Thousand/µL      Basophils Absolute 0.03 Thousands/µL                    PE Study with CT abdomen & pelvis with contrast   Final Result by John Bethea MD (03/31 0115)      No pulmonary embolism.      Findings consistent with enterocolitis      1.6 cm left adrenal nodule. Although its imaging features are indeterminate, it does not have suspicious imaging features (heterogeneity, necrosis, irregular margins), therefore this is likely benign, and can be followed by non-contrast abdomen CT or MRI    in 12 months. Please note that for adrenal nodule > 1 cm,  biochemical evaluation is suggested to rule out functioning adenoma, if not already performed.                  Workstation performed: [id number]         XR chest 1 view portable    (Results Pending)              Procedures  Procedures         ED Course                               SBIRT  20yo+      Flowsheet Row Most Recent Value   Initial Alcohol Screen: US AUDIT-C     1. How often do you have a drink containing alcohol? 0 Filed at: 03/30/2024 2215   2. How many drinks containing alcohol do you have on a typical day you are drinking?  0 Filed at: 03/30/2024 2215   3b. FEMALE Any Age, or MALE 65+: How often do you have 4 or more drinks on one occassion? 0 Filed at: 03/30/2024 2215   Audit-C Score 0 Filed at: 03/30/2024 2215   LELIA: How many times in the past year have you...    Used an illegal drug or used a prescription medication for non-medical reasons? Never Filed at: 03/30/2024 2215                      Medical Decision Making  2154:  Pt appears well, VS reviewed.  Patient with numerous complaints, most specifically nausea vomiting diarrhea, recently started Trulicity.  Patient is also complaining of some mild shortness of breath after vomiting.  No DVT/PE stigmata or symptomatology however plan to complete basic labs including cardiac enzymes, D-dimer.  Plan to rehydrate with IV fluids.  Benign abdominal exam.  Check chest x-ray.    2252: Labs reviewed.  Elevated D-dimer.  Plan to complete CT chest PE protocol.  Plan to complete CT abdomen pelvis given nausea, vomiting diarrhea, and abdominal pain.    0110: CT and labs reviewed.  Patient feels much improved with above therapy.  Informed patient of the possible side effects of Trulicity and to have conversation with her PCP about resuming medication.    Amount and/or Complexity of Data Reviewed  Labs: ordered.  Radiology: ordered.     Details: CXR--no acute pathology  CT chest abdomen pelvis--no PE, enterocolitis  ECG/medicine tests: ordered and independent interpretation performed.     Details: Normal sinus rhythm 89 bpm, no acute ischemia    Risk  Prescription drug management.             Disposition  Final diagnoses:   Nausea and vomiting   Diarrhea   Generalized abdominal pain   UTI (urinary tract infection)   Palpitations   Enterocolitis      Time reflects when diagnosis was documented in both MDM as applicable and the Disposition within this note       Time User Action Codes Description Comment    3/31/2024  1:19 AM Bimal Schuster Add [R11.2] Nausea and vomiting     3/31/2024  1:19 AM Bimal Schuster Add [R19.7] Diarrhea     3/31/2024  1:19 AM SchusterBimal singh Add [R10.84] Generalized abdominal pain     3/31/2024  1:19 AM Bimal Schuster Add [N39.0] UTI (urinary tract infection)     3/31/2024  1:19 AM Bimal Schuster Add [R00.2] Palpitations     3/31/2024  1:24 AM SchusterBimal singh Add [K52.9] Enterocolitis     3/31/2024  1:24 AM Bimal Schuster Modify [R11.2] Nausea and vomiting     3/31/2024  1:24 AM Bimal Schuster Modify [K52.9] Enterocolitis           ED Disposition       ED Disposition   Discharge    Condition   Stable    Date/Time   Sun Mar 31, 2024 0119    Comment   Gloria Mitchell discharge to home/self care.                   Follow-up Information       Follow up With Specialties Details Why Contact Info    Rito Méndez PA-C Family Medicine, Physician Assistant Schedule an appointment as soon as possible for a visit   [address] [place] [address]  [phone]              Patient's Medications   Discharge Prescriptions    CIPROFLOXACIN (CIPRO) 500 MG TABLET    Take 1 tablet (500 mg total) by mouth 2 (two) times a day for 7 days       Start Date: 3/31/2024 End Date: 4/7/2024       Order Dose: 500 mg       Quantity: 14 tablet    Refills: 0       No discharge procedures on file.    PDMP Review       None            ED Provider  Electronically Signed by             Bimal Schuster MD  03/31/24 0138

## 2024-04-01 LAB
ATRIAL RATE: 89 BPM
P AXIS: 57 DEGREES
PR INTERVAL: 158 MS
QRS AXIS: 34 DEGREES
QRSD INTERVAL: 80 MS
QT INTERVAL: 354 MS
QTC INTERVAL: 430 MS
T WAVE AXIS: 57 DEGREES
VENTRICULAR RATE: 89 BPM

## 2024-04-02 LAB — BACTERIA UR CULT: NORMAL

## 2024-04-09 ENCOUNTER — CLINICAL SUPPORT (OUTPATIENT)
Dept: FAMILY MEDICINE CLINIC | Facility: CLINIC | Age: 64
End: 2024-04-09

## 2024-04-09 DIAGNOSIS — E11.69 TYPE 2 DIABETES MELLITUS WITH OTHER SPECIFIED COMPLICATION, WITH LONG-TERM CURRENT USE OF INSULIN (HCC): Primary | ICD-10-CM

## 2024-04-09 DIAGNOSIS — Z79.4 TYPE 2 DIABETES MELLITUS WITH OTHER SPECIFIED COMPLICATION, WITH LONG-TERM CURRENT USE OF INSULIN (HCC): Primary | ICD-10-CM

## 2024-04-09 NOTE — PROGRESS NOTES
[facility]  Shalini Corbin, Pharmacist    Gloria Mitchell is a 63 y.o. female who was referred to the pharmacist for diabetes education and management, referred by previous PCP Dr. Rito Méndez PA-C .    Telemedicine consent  The patient was identified by name and date of birth. Gloria Mitchell was informed that this is a telemedicine visit and that the visit is being conducted through Telephone.  My office door was closed. No one else was in the room.  She acknowledged consent and understanding of privacy and security of the video platform. The patient has agreed to participate and understands they can discontinue the visit at any time.    Assessment/ Plan     Type 2 Diabetes:  Goal A1c <7 based on ADA/AACE/ACE guidelines. Most recent A1c   Lab Results   Component Value Date    HGBA1C 6.1 03/07/2024      Reported Home SMBG  Blood sugar readings well controlled. Fasting at goal. Post dinner slight elevations depending on what she ate  Complications:  Microvascular complications: none noted  Macrovascular complications: none noted  Medication cost: enrolled in [employer]ET  Current Diabetes Regimen:  Metformin 1000mg BID  Glimepiride 2mg BID  Trulicity 0.75 mg weekly    Changes to Medication Regimen:   If PCP is in agreement with plan  No changes today. A1c and blood sugar readings well controlled.   Patient not on statin therapy. Discussed the importance of statin due to hx diabetes. She is due for updated lipid pancel which has been ordered. Recommend statin therapy pending lipid panel.     2. Need for Additional Therapies:  Statin: not currently on - consider moderate statin therapy  ACEI/ARB: on lisinopril 10mg daily  ASA: not indicated      Monitoring: FBG daily    Follow-up: 8 weeks with PCP    Subjective     Medication Adherence/ Tolerability:  Metformin 1000 mg BID   Glimepiride 2 mg BID- Takes with Breakfast and dinner   Trulicity 0.75 mg once weekly- no side effects on this  current dose. Denies stomach cramps, diarrhea, or bloating.     Medications Tried in Past:  NPH  Ozempic    2. Lifestyle:   Last visit with dietician: n/a  Previously attended Living Well with Diabetes Class: no  Diet Recall:   Physical Activity: limited    3. Home monitoring devices  Glucometer: Yes, Brand: unknown  CGM: No, Brand: n/a  BP monitor: No, Brand: n/a      Objective     Lab Results   Component Value Date    HGBA1C 6.1 03/07/2024    HGBA1C 6.6 (A) 12/05/2023    HGBA1C 10.5 (A) 08/01/2023       Blood Glucose Readings  The patient is currently checking blood glucose 2 times per day. Patient reports with SMBG logs.    Date AM Post-Dinner  2 hours after Comments   3/15 135 233    3/16 101 157     99 217     114 231     112 302  267 before bed cake    112 140     82 143     80 149     63 120     98 244     88 221     77 164     111 115     91 214     153 226     168 97     80 191     99 161     77 137     91 185     92 179     128 182     77 216     95 124    4/9 80 93    Avg 100 181        ASCVD Risk:  The ASCVD Risk score (Mynor ANTONIO, et al., 2019) failed to calculate for the following reasons:    Cannot find a previous HDL lab    Cannot find a previous total cholesterol lab     Vitals:  BP Readings from Last 3 Encounters:   03/31/24 103/56   03/07/24 132/78   12/05/23 130/72     Resp Readings from Last 3 Encounters:   03/31/24 21   12/05/23 18   10/20/23 18     Wt Readings from Last 3 Encounters:   03/30/24 91.9 kg (202 lb 9.6 oz)   03/07/24 88.5 kg (195 lb 3.2 oz)   12/05/23 92.4 kg (203 lb 12.8 oz)     Labs:  Lab Results   Component Value Date    SODIUM 134 (L) 03/30/2024    K 4.6 03/30/2024    EGFR 56 03/30/2024    CREATININE 1.04 03/30/2024    [id number] 819 09/12/2019    MICROALBCRE 115 (H) 05/04/2023     Pharmacist Tracking Tool     Pharmacist Tracking Tool  Reason For Outreach: Embedded Pharmacist  Demographics:  Intervention Method: Phone  Type of Intervention: Follow-Up  Topics Addressed:  Diabetes  Pharmacologic Interventions: N/A  Non-Pharmacologic Interventions: Disease state education, Home Monitoring, and Medication/Device education  Time:  Direct Patient Care:  20  mins  Care Coordination:  10  mins  Recommendation Recipient: Patient/Caregiver and Provider  Outcome: Accepted

## 2024-04-11 DIAGNOSIS — I10 HYPERTENSION, ESSENTIAL: ICD-10-CM

## 2024-04-11 DIAGNOSIS — E11.65 TYPE 2 DIABETES MELLITUS WITH HYPERGLYCEMIA, WITHOUT LONG-TERM CURRENT USE OF INSULIN (HCC): ICD-10-CM

## 2024-04-11 RX ORDER — GLIMEPIRIDE 4 MG/1
4 TABLET ORAL 2 TIMES DAILY
Qty: 180 TABLET | Refills: 1 | Status: SHIPPED | OUTPATIENT
Start: 2024-04-11

## 2024-04-11 RX ORDER — LISINOPRIL 10 MG/1
10 TABLET ORAL DAILY
Qty: 90 TABLET | Refills: 1 | Status: SHIPPED | OUTPATIENT
Start: 2024-04-11

## 2024-04-15 ENCOUNTER — VBI (OUTPATIENT)
Dept: ADMINISTRATIVE | Facility: OTHER | Age: 64
End: 2024-04-15

## 2024-04-25 ENCOUNTER — TELEPHONE (OUTPATIENT)
Dept: FAMILY MEDICINE CLINIC | Facility: CLINIC | Age: 64
End: 2024-04-25

## 2024-04-25 NOTE — TELEPHONE ENCOUNTER
LVM asking for a call back to reschedule 6/13 appt as Rito will not be in the office.  If still available offer morning of 6/12 with Rito if pt calls back. TY

## 2024-04-26 ENCOUNTER — VBI (OUTPATIENT)
Dept: ADMINISTRATIVE | Facility: OTHER | Age: 64
End: 2024-04-26

## 2024-05-18 DIAGNOSIS — M79.642 PAIN IN BOTH HANDS: ICD-10-CM

## 2024-05-18 DIAGNOSIS — M13.0 POLYARTICULAR ARTHRITIS: ICD-10-CM

## 2024-05-18 DIAGNOSIS — M79.641 PAIN IN BOTH HANDS: ICD-10-CM

## 2024-05-18 RX ORDER — MELOXICAM 15 MG/1
15 TABLET ORAL DAILY
Qty: 90 TABLET | Refills: 1 | Status: SHIPPED | OUTPATIENT
Start: 2024-05-18

## 2024-06-04 ENCOUNTER — VBI (OUTPATIENT)
Dept: ADMINISTRATIVE | Facility: OTHER | Age: 64
End: 2024-06-04

## 2024-06-13 ENCOUNTER — TELEPHONE (OUTPATIENT)
Dept: FAMILY MEDICINE CLINIC | Facility: CLINIC | Age: 64
End: 2024-06-13

## 2024-06-13 DIAGNOSIS — Z79.4 TYPE 2 DIABETES MELLITUS WITH OTHER SPECIFIED COMPLICATION, WITH LONG-TERM CURRENT USE OF INSULIN (HCC): ICD-10-CM

## 2024-06-13 DIAGNOSIS — E11.69 TYPE 2 DIABETES MELLITUS WITH OTHER SPECIFIED COMPLICATION, WITH LONG-TERM CURRENT USE OF INSULIN (HCC): ICD-10-CM

## 2024-06-13 RX ORDER — DULAGLUTIDE 0.75 MG/.5ML
INJECTION, SOLUTION SUBCUTANEOUS
Qty: 4 ML | Refills: 0 | Status: SHIPPED | OUTPATIENT
Start: 2024-06-13

## 2024-07-03 DIAGNOSIS — E11.9 TYPE 2 DIABETES MELLITUS WITHOUT COMPLICATION, WITHOUT LONG-TERM CURRENT USE OF INSULIN (HCC): ICD-10-CM

## 2024-07-08 ENCOUNTER — VBI (OUTPATIENT)
Dept: ADMINISTRATIVE | Facility: OTHER | Age: 64
End: 2024-07-08

## 2024-07-08 NOTE — TELEPHONE ENCOUNTER
07/08/24 10:04 AM     Chart reviewed for Mammogram was/were not submitted to the patient's insurance.     Harjit Menezes   [facility]

## 2024-07-17 ENCOUNTER — OFFICE VISIT (OUTPATIENT)
Dept: FAMILY MEDICINE CLINIC | Facility: CLINIC | Age: 64
End: 2024-07-17
Payer: COMMERCIAL

## 2024-07-17 ENCOUNTER — TELEPHONE (OUTPATIENT)
Dept: FAMILY MEDICINE CLINIC | Facility: CLINIC | Age: 64
End: 2024-07-17

## 2024-07-17 VITALS
SYSTOLIC BLOOD PRESSURE: 116 MMHG | HEART RATE: 83 BPM | WEIGHT: 196.8 LBS | HEIGHT: 60 IN | BODY MASS INDEX: 38.64 KG/M2 | DIASTOLIC BLOOD PRESSURE: 80 MMHG | OXYGEN SATURATION: 99 % | TEMPERATURE: 96.7 F

## 2024-07-17 DIAGNOSIS — Z12.31 ENCOUNTER FOR SCREENING MAMMOGRAM FOR BREAST CANCER: ICD-10-CM

## 2024-07-17 DIAGNOSIS — Z78.0 ENCOUNTER FOR OSTEOPOROSIS SCREENING IN ASYMPTOMATIC POSTMENOPAUSAL PATIENT: ICD-10-CM

## 2024-07-17 DIAGNOSIS — E11.69 TYPE 2 DIABETES MELLITUS WITH OTHER SPECIFIED COMPLICATION, WITH LONG-TERM CURRENT USE OF INSULIN (HCC): ICD-10-CM

## 2024-07-17 DIAGNOSIS — Z12.4 SCREENING FOR CERVICAL CANCER: ICD-10-CM

## 2024-07-17 DIAGNOSIS — Z79.4 TYPE 2 DIABETES MELLITUS WITH OTHER SPECIFIED COMPLICATION, WITH LONG-TERM CURRENT USE OF INSULIN (HCC): Primary | ICD-10-CM

## 2024-07-17 DIAGNOSIS — I10 HYPERTENSION, ESSENTIAL: ICD-10-CM

## 2024-07-17 DIAGNOSIS — Z13.820 ENCOUNTER FOR OSTEOPOROSIS SCREENING IN ASYMPTOMATIC POSTMENOPAUSAL PATIENT: ICD-10-CM

## 2024-07-17 DIAGNOSIS — Z12.11 SCREEN FOR COLON CANCER: ICD-10-CM

## 2024-07-17 DIAGNOSIS — E78.2 MIXED HYPERLIPIDEMIA: ICD-10-CM

## 2024-07-17 DIAGNOSIS — M13.0 POLYARTICULAR ARTHRITIS: ICD-10-CM

## 2024-07-17 DIAGNOSIS — Z79.4 TYPE 2 DIABETES MELLITUS WITH OTHER SPECIFIED COMPLICATION, WITH LONG-TERM CURRENT USE OF INSULIN (HCC): ICD-10-CM

## 2024-07-17 DIAGNOSIS — E11.69 TYPE 2 DIABETES MELLITUS WITH OTHER SPECIFIED COMPLICATION, WITH LONG-TERM CURRENT USE OF INSULIN (HCC): Primary | ICD-10-CM

## 2024-07-17 LAB — SL AMB POCT HEMOGLOBIN AIC: 6.1 (ref ?–6.5)

## 2024-07-17 PROCEDURE — 99214 OFFICE O/P EST MOD 30 MIN: CPT

## 2024-07-17 PROCEDURE — 83036 HEMOGLOBIN GLYCOSYLATED A1C: CPT

## 2024-07-17 NOTE — ASSESSMENT & PLAN NOTE
A1c is 6.1 today.  Patient is praised for this.  Patient is also lost 8 pounds since last visit.  Patient is to continue healthy diet and adequate exercise.  Is also to continue Trulicity 0.75 mg weekly, glimepiride 2 mg 2 times daily, and metformin 1000 mg twice a day.  Due for foot exam at next visit.  Lab Results   Component Value Date    HGBA1C 6.1 03/07/2024

## 2024-07-17 NOTE — ASSESSMENT & PLAN NOTE
Patient encouraged to get labs to continue monitoring cholesterol.  Will follow-up pending her lab results.

## 2024-07-17 NOTE — PROGRESS NOTES
Ambulatory Visit  Name: Gloria Mitchell      : 1960      MRN: [id number]  Encounter Provider: Rito Méndez PA-C  Encounter Date: 2024   Encounter department: [facility]    Assessment & Plan   1. Type 2 diabetes mellitus with other specified complication, with long-term current use of insulin (HCC)  Assessment & Plan:  A1c is 6.1 today.  Patient is praised for this.  Patient is also lost 8 pounds since last visit.  Patient is to continue healthy diet and adequate exercise.  Is also to continue Trulicity 0.75 mg weekly, glimepiride 2 mg 2 times daily, and metformin 1000 mg twice a day.  Due for foot exam at next visit.  Lab Results   Component Value Date    HGBA1C 6.1 2024     Orders:  -     POCT hemoglobin A1c  -     Albumin / creatinine urine ratio; Future  -     CBC and differential; Future  -     Comprehensive metabolic panel; Future  -     CBC and differential  -     Comprehensive metabolic panel  2. Hypertension, essential  Assessment & Plan:  At goal today.  Continue lisinopril 10 mg daily.  3. Polyarticular arthritis  Assessment & Plan:  Stable.  Continue Mobic 15 mg daily as needed.  Will monitor kidney function.  4. Mixed hyperlipidemia  Assessment & Plan:  Patient encouraged to get labs to continue monitoring cholesterol.  Will follow-up pending her lab results.  Orders:  -     Lipid panel; Future  -     Lipid panel  5. Encounter for screening mammogram for breast cancer  -     Mammo screening bilateral w 3d & cad; Future  6. Screening for cervical cancer  Comments:  Risk/benefits discussed.  Patient declines.  Patient is to contact office if she would like to complete this.  7. Encounter for osteoporosis screening in asymptomatic postmenopausal patient  Comments:  Risk/benefits discussed. Patient declines. Patient is to contact office if she would like to complete this.  8. Screen for colon cancer  Comments:  Risk/benefits/options discussed. Patient declines.  Patient is to contact office if she would like to complete this.       History of Present Illness     Patient is a 63-year-old female presenting for follow-up.  Patient has no concerns today.  Patient did not complete lab work since last visit.  She has been compliant with medications.        Review of Systems   Constitutional:  Negative for appetite change, chills, diaphoresis, fatigue and fever.   HENT:  Negative for congestion, ear discharge, ear pain, postnasal drip, rhinorrhea, sinus pressure, sinus pain, sneezing and sore throat.    Eyes:  Negative for pain, discharge, redness, itching and visual disturbance.   Respiratory:  Negative for apnea, cough, chest tightness, shortness of breath and wheezing.    Cardiovascular:  Negative for chest pain, palpitations and leg swelling.   Gastrointestinal:  Negative for abdominal pain, blood in stool, constipation, diarrhea, nausea and vomiting.   Endocrine: Negative for cold intolerance, heat intolerance, polydipsia and polyuria.   Genitourinary:  Negative for dysuria, flank pain, frequency, hematuria and urgency.   Musculoskeletal:  Negative for arthralgias, back pain, myalgias, neck pain and neck stiffness.   Skin:  Negative for color change and rash.   Allergic/Immunologic: Negative for environmental allergies and food allergies.   Neurological:  Negative for dizziness, tremors, seizures, syncope, speech difficulty, weakness, light-headedness, numbness and headaches.   Hematological:  Negative for adenopathy. Does not bruise/bleed easily.   Psychiatric/Behavioral:  Negative for agitation, confusion, decreased concentration, dysphoric mood, hallucinations, self-injury, sleep disturbance and suicidal ideas. The patient is not nervous/anxious and is not hyperactive.    All other systems reviewed and are negative.    Medical History Reviewed by provider this encounter:       Current Outpatient Medications on File Prior to Visit   Medication Sig Dispense Refill     glimepiride (AMARYL) 4 mg tablet Take 1 tablet by mouth twice daily (Patient taking differently: Take 2 mg by mouth 2 (two) times a day) 180 tablet 1    lisinopril (ZESTRIL) 10 mg tablet Take 1 tablet by mouth once daily 90 tablet 1    meloxicam (MOBIC) 15 mg tablet Take 1 tablet by mouth once daily 90 tablet 1    metFORMIN (GLUCOPHAGE) 1000 MG tablet TAKE 1 TABLET BY MOUTH EVERY 12 HOURS 200 tablet 1    multivitamin (THERAGRAN) TABS Take 1 tablet by mouth daily      Trulicity 0.75 MG/0.5ML injection INJECT 0.75 MG SUBCUTANEOUSLY  ONCE A WEEK 4 mL 0    Vitamin C 250 MG TABS Take by mouth daily       No current facility-administered medications on file prior to visit.      Social History     Tobacco Use    Smoking status: Never    Smokeless tobacco: Never   Vaping Use    Vaping status: Never Used   Substance and Sexual Activity    Alcohol use: No    Drug use: No    Sexual activity: Never     Objective     /80 (BP Location: Left arm, Patient Position: Sitting)   Pulse 83   Temp (!) 96.7 °F (35.9 °C) (Tympanic)   Ht 5' (1.524 m)   Wt 89.3 kg (196 lb 12.8 oz)   SpO2 99%   BMI 38.43 kg/m²     Physical Exam  Vitals and nursing note reviewed.   Constitutional:       General: She is not in acute distress.     Appearance: Normal appearance. She is well-developed. She is obese. She is not ill-appearing, toxic-appearing or diaphoretic.   HENT:      Head: Normocephalic and atraumatic.      Right Ear: Tympanic membrane normal.      Left Ear: Tympanic membrane normal.      Nose: Nose normal.      Mouth/Throat:      Mouth: Mucous membranes are moist.      Pharynx: Oropharynx is clear.   Eyes:      Conjunctiva/sclera: Conjunctivae normal.      Pupils: Pupils are equal, round, and reactive to light.   Cardiovascular:      Rate and Rhythm: Normal rate and regular rhythm.      Pulses: Normal pulses.      Heart sounds: Normal heart sounds. No murmur heard.  Pulmonary:      Effort: Pulmonary effort is normal. No respiratory  distress.      Breath sounds: Normal breath sounds. No wheezing.   Chest:      Chest wall: No tenderness.   Abdominal:      General: Bowel sounds are normal.      Palpations: Abdomen is soft. There is no mass.      Tenderness: There is no abdominal tenderness.   Musculoskeletal:         General: No swelling or tenderness. Normal range of motion.      Cervical back: Normal range of motion and neck supple. No tenderness.      Right lower leg: No edema.      Left lower leg: No edema.   Lymphadenopathy:      Cervical: No cervical adenopathy.   Skin:     General: Skin is warm and dry.      Capillary Refill: Capillary refill takes less than 2 seconds.      Findings: No erythema, lesion or rash.   Neurological:      General: No focal deficit present.      Mental Status: She is alert and oriented to person, place, and time. Mental status is at baseline.      Motor: No weakness.      Coordination: Coordination normal.      Gait: Gait normal.   Psychiatric:         Mood and Affect: Mood normal.         Behavior: Behavior normal.         Thought Content: Thought content normal.         Judgment: Judgment normal.       Administrative Statements

## 2024-07-18 RX ORDER — DULAGLUTIDE 0.75 MG/.5ML
INJECTION, SOLUTION SUBCUTANEOUS
Qty: 4 ML | Refills: 1 | Status: SHIPPED | OUTPATIENT
Start: 2024-07-18

## 2024-07-25 ENCOUNTER — PATIENT MESSAGE (OUTPATIENT)
Dept: FAMILY MEDICINE CLINIC | Facility: CLINIC | Age: 64
End: 2024-07-25

## 2024-08-01 ENCOUNTER — VBI (OUTPATIENT)
Dept: ADMINISTRATIVE | Facility: OTHER | Age: 64
End: 2024-08-01

## 2024-08-01 NOTE — TELEPHONE ENCOUNTER
08/01/24 12:44 PM     Chart reviewed for Diabetic Eye Exam was/were submitted to the patient's insurance.     Harjit Menezes MA   [facility]

## 2024-09-08 DIAGNOSIS — E11.69 TYPE 2 DIABETES MELLITUS WITH OTHER SPECIFIED COMPLICATION, WITH LONG-TERM CURRENT USE OF INSULIN (HCC): ICD-10-CM

## 2024-09-08 DIAGNOSIS — Z79.4 TYPE 2 DIABETES MELLITUS WITH OTHER SPECIFIED COMPLICATION, WITH LONG-TERM CURRENT USE OF INSULIN (HCC): ICD-10-CM

## 2024-09-09 RX ORDER — DULAGLUTIDE 0.75 MG/.5ML
INJECTION, SOLUTION SUBCUTANEOUS
Qty: 4 ML | Refills: 0 | Status: SHIPPED | OUTPATIENT
Start: 2024-09-09

## 2024-09-27 ENCOUNTER — VBI (OUTPATIENT)
Dept: ADMINISTRATIVE | Facility: OTHER | Age: 64
End: 2024-09-27

## 2024-09-27 NOTE — TELEPHONE ENCOUNTER
09/27/24 10:51 AM     Chart reviewed for Diabetic Eye Exam was/were submitted to the patient's insurance.     Harjit Menezes MA   [facility]

## 2024-10-02 ENCOUNTER — VBI (OUTPATIENT)
Dept: ADMINISTRATIVE | Facility: OTHER | Age: 64
End: 2024-10-02

## 2024-10-02 DIAGNOSIS — I10 HYPERTENSION, ESSENTIAL: ICD-10-CM

## 2024-10-02 RX ORDER — LISINOPRIL 10 MG/1
10 TABLET ORAL DAILY
Qty: 90 TABLET | Refills: 1 | Status: SHIPPED | OUTPATIENT
Start: 2024-10-02

## 2024-10-02 NOTE — TELEPHONE ENCOUNTER
10/02/24 8:39 AM     Chart reviewed for Diabetic Eye Exam was/were submitted to the patient's insurance.     Harjit Menezes MA   [facility]

## 2024-10-05 DIAGNOSIS — Z79.4 TYPE 2 DIABETES MELLITUS WITH OTHER SPECIFIED COMPLICATION, WITH LONG-TERM CURRENT USE OF INSULIN (HCC): ICD-10-CM

## 2024-10-05 DIAGNOSIS — E11.69 TYPE 2 DIABETES MELLITUS WITH OTHER SPECIFIED COMPLICATION, WITH LONG-TERM CURRENT USE OF INSULIN (HCC): ICD-10-CM

## 2024-10-07 RX ORDER — DULAGLUTIDE 0.75 MG/.5ML
INJECTION, SOLUTION SUBCUTANEOUS
Qty: 4 ML | Refills: 0 | Status: SHIPPED | OUTPATIENT
Start: 2024-10-07

## 2024-11-03 DIAGNOSIS — Z79.4 TYPE 2 DIABETES MELLITUS WITH OTHER SPECIFIED COMPLICATION, WITH LONG-TERM CURRENT USE OF INSULIN (HCC): ICD-10-CM

## 2024-11-03 DIAGNOSIS — E11.69 TYPE 2 DIABETES MELLITUS WITH OTHER SPECIFIED COMPLICATION, WITH LONG-TERM CURRENT USE OF INSULIN (HCC): ICD-10-CM

## 2024-11-05 RX ORDER — DULAGLUTIDE 0.75 MG/.5ML
INJECTION, SOLUTION SUBCUTANEOUS
Qty: 2 ML | Refills: 5 | Status: SHIPPED | OUTPATIENT
Start: 2024-11-05

## 2024-11-10 DIAGNOSIS — M13.0 POLYARTICULAR ARTHRITIS: ICD-10-CM

## 2024-11-10 DIAGNOSIS — M79.641 PAIN IN BOTH HANDS: ICD-10-CM

## 2024-11-10 DIAGNOSIS — M79.642 PAIN IN BOTH HANDS: ICD-10-CM

## 2024-11-11 RX ORDER — MELOXICAM 15 MG/1
15 TABLET ORAL DAILY
Qty: 90 TABLET | Refills: 1 | Status: SHIPPED | OUTPATIENT
Start: 2024-11-11

## 2024-11-20 ENCOUNTER — OFFICE VISIT (OUTPATIENT)
Dept: FAMILY MEDICINE CLINIC | Facility: CLINIC | Age: 64
End: 2024-11-20
Payer: COMMERCIAL

## 2024-11-20 ENCOUNTER — RESULTS FOLLOW-UP (OUTPATIENT)
Dept: FAMILY MEDICINE CLINIC | Facility: CLINIC | Age: 64
End: 2024-11-20

## 2024-11-20 ENCOUNTER — TELEPHONE (OUTPATIENT)
Dept: FAMILY MEDICINE CLINIC | Facility: CLINIC | Age: 64
End: 2024-11-20

## 2024-11-20 VITALS
SYSTOLIC BLOOD PRESSURE: 126 MMHG | WEIGHT: 197.6 LBS | DIASTOLIC BLOOD PRESSURE: 63 MMHG | TEMPERATURE: 97 F | HEART RATE: 76 BPM | BODY MASS INDEX: 38.79 KG/M2 | HEIGHT: 60 IN | OXYGEN SATURATION: 98 %

## 2024-11-20 DIAGNOSIS — Z79.4 TYPE 2 DIABETES MELLITUS WITH OTHER SPECIFIED COMPLICATION, WITH LONG-TERM CURRENT USE OF INSULIN (HCC): ICD-10-CM

## 2024-11-20 DIAGNOSIS — Z12.31 ENCOUNTER FOR SCREENING MAMMOGRAM FOR BREAST CANCER: ICD-10-CM

## 2024-11-20 DIAGNOSIS — E11.69 TYPE 2 DIABETES MELLITUS WITH OTHER SPECIFIED COMPLICATION, WITH LONG-TERM CURRENT USE OF INSULIN (HCC): ICD-10-CM

## 2024-11-20 DIAGNOSIS — Z78.0 ASYMPTOMATIC MENOPAUSAL STATE: ICD-10-CM

## 2024-11-20 DIAGNOSIS — Z23 ENCOUNTER FOR IMMUNIZATION: ICD-10-CM

## 2024-11-20 DIAGNOSIS — I10 HYPERTENSION, ESSENTIAL: ICD-10-CM

## 2024-11-20 DIAGNOSIS — Z00.00 MEDICARE ANNUAL WELLNESS VISIT, SUBSEQUENT: Primary | ICD-10-CM

## 2024-11-20 DIAGNOSIS — E78.2 MIXED HYPERLIPIDEMIA: ICD-10-CM

## 2024-11-20 DIAGNOSIS — H04.129 DRY EYE: ICD-10-CM

## 2024-11-20 DIAGNOSIS — E66.01 MORBID OBESITY WITH BMI OF 40.0-44.9, ADULT (HCC): ICD-10-CM

## 2024-11-20 DIAGNOSIS — M13.0 POLYARTICULAR ARTHRITIS: ICD-10-CM

## 2024-11-20 DIAGNOSIS — K21.9 GERD WITHOUT ESOPHAGITIS: ICD-10-CM

## 2024-11-20 LAB
ALBUMIN SERPL-MCNC: 4.3 G/DL (ref 3.6–5.1)
ALBUMIN/GLOB SERPL: 1.6 (CALC) (ref 1–2.5)
ALP SERPL-CCNC: 43 U/L (ref 37–153)
ALT SERPL-CCNC: 18 U/L (ref 6–29)
AST SERPL-CCNC: 18 U/L (ref 10–35)
BASOPHILS # BLD AUTO: 43 CELLS/UL (ref 0–200)
BASOPHILS NFR BLD AUTO: 0.6 %
BILIRUB SERPL-MCNC: 0.5 MG/DL (ref 0.2–1.2)
BUN SERPL-MCNC: 30 MG/DL (ref 7–25)
BUN/CREAT SERPL: 31 (CALC) (ref 6–22)
CALCIUM SERPL-MCNC: 10.4 MG/DL (ref 8.6–10.4)
CHLORIDE SERPL-SCNC: 103 MMOL/L (ref 98–110)
CHOLEST SERPL-MCNC: 166 MG/DL
CHOLEST/HDLC SERPL: 2.8 (CALC)
CO2 SERPL-SCNC: 27 MMOL/L (ref 20–32)
CREAT SERPL-MCNC: 0.97 MG/DL (ref 0.5–1.05)
CREAT UR-MCNC: 50.1 MG/DL
EOSINOPHIL # BLD AUTO: 170 CELLS/UL (ref 15–500)
EOSINOPHIL NFR BLD AUTO: 2.4 %
ERYTHROCYTE [DISTWIDTH] IN BLOOD BY AUTOMATED COUNT: 12.6 % (ref 11–15)
GFR/BSA.PRED SERPLBLD CYS-BASED-ARV: 64 ML/MIN/1.73M2
GLOBULIN SER CALC-MCNC: 2.7 G/DL (CALC) (ref 1.9–3.7)
GLUCOSE SERPL-MCNC: 102 MG/DL (ref 65–99)
HCT VFR BLD AUTO: 36.1 % (ref 35–45)
HDLC SERPL-MCNC: 60 MG/DL
HGB BLD-MCNC: 11.9 G/DL (ref 11.7–15.5)
LDLC SERPL CALC-MCNC: 88 MG/DL (CALC)
LYMPHOCYTES # BLD AUTO: 1590 CELLS/UL (ref 850–3900)
LYMPHOCYTES NFR BLD AUTO: 22.4 %
MCH RBC QN AUTO: 29.7 PG (ref 27–33)
MCHC RBC AUTO-ENTMCNC: 33 G/DL (ref 32–36)
MCV RBC AUTO: 90 FL (ref 80–100)
MICROALBUMIN UR-MCNC: <7 MG/L
MONOCYTES # BLD AUTO: 547 CELLS/UL (ref 200–950)
MONOCYTES NFR BLD AUTO: 7.7 %
NEUTROPHILS # BLD AUTO: 4750 CELLS/UL (ref 1500–7800)
NEUTROPHILS NFR BLD AUTO: 66.9 %
NONHDLC SERPL-MCNC: 106 MG/DL (CALC)
PLATELET # BLD AUTO: 190 THOUSAND/UL (ref 140–400)
PMV BLD REES-ECKER: 10.5 FL (ref 7.5–12.5)
POTASSIUM SERPL-SCNC: 4.4 MMOL/L (ref 3.5–5.3)
PROT SERPL-MCNC: 7 G/DL (ref 6.1–8.1)
RBC # BLD AUTO: 4.01 MILLION/UL (ref 3.8–5.1)
SL AMB POCT HEMOGLOBIN AIC: 6.1 (ref ?–6.5)
SODIUM SERPL-SCNC: 139 MMOL/L (ref 135–146)
TRIGL SERPL-MCNC: 87 MG/DL
WBC # BLD AUTO: 7.1 THOUSAND/UL (ref 3.8–10.8)

## 2024-11-20 PROCEDURE — 82570 ASSAY OF URINE CREATININE: CPT

## 2024-11-20 PROCEDURE — G0439 PPPS, SUBSEQ VISIT: HCPCS

## 2024-11-20 PROCEDURE — 82043 UR ALBUMIN QUANTITATIVE: CPT

## 2024-11-20 PROCEDURE — 83036 HEMOGLOBIN GLYCOSYLATED A1C: CPT

## 2024-11-20 RX ORDER — ROSUVASTATIN CALCIUM 5 MG/1
5 TABLET, COATED ORAL DAILY
Qty: 90 TABLET | Refills: 1 | Status: SHIPPED | OUTPATIENT
Start: 2024-11-20

## 2024-11-20 NOTE — ASSESSMENT & PLAN NOTE
At goal today.  Continue lisinopril 10 mg daily.  Encouraged to take at home blood pressures and contact office if persistently elevated.

## 2024-11-20 NOTE — PROGRESS NOTES
Name: Gloria Mitchell      : 1960      MRN: [id number]  Encounter Provider: Rito Méndez PA-C  Encounter Date: 2024   Encounter department: [facility]    Assessment & Plan  Need for hepatitis C screening test         Encounter for screening mammogram for breast cancer         Encounter for immunization              Preventive health issues were discussed with patient, and age appropriate screening tests were ordered as noted in patient's After Visit Summary. Personalized health advice and appropriate referrals for health education or preventive services given if needed, as noted in patient's After Visit Summary.    History of Present Illness   {?Quick Links Encounters * My Last Note * Last Note in Specialty * Snapshot * Since Last Visit * History :75543}  HPI   Patient Care Team:  Rito Méndez PA-C as PCP - General (Physician Assistant)    Review of Systems  Medical History Reviewed by provider this encounter:       Annual Wellness Visit Questionnaire   Annual Wellness Visit  Social Drivers of Health     Financial Resource Strain: Medium Risk (10/20/2023)    Overall Financial Resource Strain (CARDIA)    • Difficulty of Paying Living Expenses: Somewhat hard   Food Insecurity: No Food Insecurity (2024)    Hunger Vital Sign    • Worried About Running Out of Food in the Last Year: Never true    • Ran Out of Food in the Last Year: Never true   Transportation Needs: No Transportation Needs (2024)    PRAPARE - Transportation    • Lack of Transportation (Medical): No    • Lack of Transportation (Non-Medical): No   Housing Stability: Low Risk  (2024)    Housing Stability Vital Sign    • Unable to Pay for Housing in the Last Year: No    • Number of Times Moved in the Last Year: 1    • Homeless in the Last Year: No   Utilities: Not At Risk (2024)    [facility] Utilities    • Threatened with loss of utilities: No     No results found.    Objective {?Quick Links Trend  Vitals * Enter New Vitals * Results Review * Timeline (Adult) * Labs * Imaging * Cardiology * Procedures * Lung Cancer Screening * Surgical eConsent :26772}  /63 (BP Location: Left arm, Patient Position: Sitting)   Pulse 76   Temp (!) 97 °F (36.1 °C) (Tympanic)   Ht 5' (1.524 m)   Wt 89.6 kg (197 lb 9.6 oz)   SpO2 98%   BMI 38.59 kg/m²     Physical Exam  {Administrative / Billing Section (Optional):36450}

## 2024-11-20 NOTE — PATIENT INSTRUCTIONS
Medicare Preventive Visit Patient Instructions  Thank you for completing your Welcome to Medicare Visit or Medicare Annual Wellness Visit today. Your next wellness visit will be due in one year (11/21/2025).  The screening/preventive services that you may require over the next 5-10 years are detailed below. Some tests may not apply to you based off risk factors and/or age. Screening tests ordered at today's visit but not completed yet may show as past due. Also, please note that scanned in results may not display below.  Preventive Screenings:  Service Recommendations Previous Testing/Comments   Colorectal Cancer Screening  * Colonoscopy    * Fecal Occult Blood Test (FOBT)/Fecal Immunochemical Test (FIT)  * Fecal DNA/Cologuard Test  * Flexible Sigmoidoscopy Age: 45-75 years old   Colonoscopy: every 10 years (may be performed more frequently if at higher risk)  OR  FOBT/FIT: every 1 year  OR  Cologuard: every 3 years  OR  Sigmoidoscopy: every 5 years  Screening may be recommended earlier than age 45 if at higher risk for colorectal cancer. Also, an individualized decision between you and your healthcare provider will decide whether screening between the ages of 76-85 would be appropriate. Colonoscopy: 06/05/2014  FOBT/FIT: Not on file  Cologuard: Not on file  Sigmoidoscopy: Not on file          Breast Cancer Screening Age: 40+ years old  Frequency: every 1-2 years  Not required if history of left and right mastectomy Mammogram: Not on file        Cervical Cancer Screening Between the ages of 21-29, pap smear recommended once every 3 years.   Between the ages of 30-65, can perform pap smear with HPV co-testing every 5 years.   Recommendations may differ for women with a history of total hysterectomy, cervical cancer, or abnormal pap smears in past. Pap Smear: Not on file    Screening Not Indicated   Hepatitis C Screening Once for adults born between 1945 and 1965  More frequently in patients at high risk for Hepatitis  C Hep C Antibody: Not on file        Diabetes Screening 1-2 times per year if you're at risk for diabetes or have pre-diabetes Fasting glucose: No results in last 5 years (No results in last 5 years)  A1C: 6.1 (7/17/2024)  Screening Not Indicated  History Diabetes   Cholesterol Screening Once every 5 years if you don't have a lipid disorder. May order more often based on risk factors. Lipid panel: 11/19/2024    Screening Not Indicated  History Lipid Disorder     Other Preventive Screenings Covered by Medicare:  Abdominal Aortic Aneurysm (AAA) Screening: covered once if your at risk. You're considered to be at risk if you have a family history of AAA.  Lung Cancer Screening: covers low dose CT scan once per year if you meet all of the following conditions: (1) Age 55-77; (2) No signs or symptoms of lung cancer; (3) Current smoker or have quit smoking within the last 15 years; (4) You have a tobacco smoking history of at least 20 pack years (packs per day multiplied by number of years you smoked); (5) You get a written order from a healthcare provider.  Glaucoma Screening: covered annually if you're considered high risk: (1) You have diabetes OR (2) Family history of glaucoma OR (3)  aged 50 and older OR (4)  American aged 65 and older  Osteoporosis Screening: covered every 2 years if you meet one of the following conditions: (1) You're estrogen deficient and at risk for osteoporosis based off medical history and other findings; (2) Have a vertebral abnormality; (3) On glucocorticoid therapy for more than 3 months; (4) Have primary hyperparathyroidism; (5) On osteoporosis medications and need to assess response to drug therapy.   Last bone density test (DXA Scan): Not on file.  HIV Screening: covered annually if you're between the age of 15-65. Also covered annually if you are younger than 15 and older than 65 with risk factors for HIV infection. For pregnant patients, it is covered up to 3  times per pregnancy.    Immunizations:  Immunization Recommendations   Influenza Vaccine Annual influenza vaccination during flu season is recommended for all persons aged >= 6 months who do not have contraindications   Pneumococcal Vaccine   * Pneumococcal conjugate vaccine = PCV13 (Prevnar 13), PCV15 (Vaxneuvance), PCV20 (Prevnar 20)  * Pneumococcal polysaccharide vaccine = PPSV23 (Pneumovax) Adults 19-65 yo with certain risk factors or if 65+ yo  If never received any pneumonia vaccine: recommend Prevnar 20 (PCV20)  Give PCV20 if previously received 1 dose of PCV13 or PPSV23   Hepatitis B Vaccine 3 dose series if at intermediate or high risk (ex: diabetes, end stage renal disease, liver disease)   Respiratory syncytial virus (RSV) Vaccine - COVERED BY MEDICARE PART D  * RSVPreF3 (Arexvy) CDC recommends that adults 60 years of age and older may receive a single dose of RSV vaccine using shared clinical decision-making (SCDM)   Tetanus (Td) Vaccine - COST NOT COVERED BY MEDICARE PART B Following completion of primary series, a booster dose should be given every 10 years to maintain immunity against tetanus. Td may also be given as tetanus wound prophylaxis.   Tdap Vaccine - COST NOT COVERED BY MEDICARE PART B Recommended at least once for all adults. For pregnant patients, recommended with each pregnancy.   Shingles Vaccine (Shingrix) - COST NOT COVERED BY MEDICARE PART B  2 shot series recommended in those 19 years and older who have or will have weakened immune systems or those 50 years and older     Health Maintenance Due:      Topic Date Due   • Hepatitis C Screening  Never done   • Breast Cancer Screening: Mammogram  Never done   • Colorectal Cancer Screening  06/05/2024     Immunizations Due:      Topic Date Due   • Influenza Vaccine (1) 09/01/2024   • COVID-19 Vaccine (4 - 2024-25 season) 09/01/2024     Advance Directives   What are advance directives?  Advance directives are legal documents that state your  wishes and plans for medical care. These plans are made ahead of time in case you lose your ability to make decisions for yourself. Advance directives can apply to any medical decision, such as the treatments you want, and if you want to donate organs.   What are the types of advance directives?  There are many types of advance directives, and each state has rules about how to use them. You may choose a combination of any of the following:  Living will:  This is a written record of the treatment you want. You can also choose which treatments you do not want, which to limit, and which to stop at a certain time. This includes surgery, medicine, IV fluid, and tube feedings.   Durable power of  for healthcare (DPAHC):  This is a written record that states who you want to make healthcare choices for you when you are unable to make them for yourself. This person, called a proxy, is usually a family member or a friend. You may choose more than 1 proxy.  Do not resuscitate (DNR) order:  A DNR order is used in case your heart stops beating or you stop breathing. It is a request not to have certain forms of treatment, such as CPR. A DNR order may be included in other types of advance directives.  Medical directive:  This covers the care that you want if you are in a coma, near death, or unable to make decisions for yourself. You can list the treatments you want for each condition. Treatment may include pain medicine, surgery, blood transfusions, dialysis, IV or tube feedings, and a ventilator (breathing machine).  Values history:  This document has questions about your views, beliefs, and how you feel and think about life. This information can help others choose the care that you would choose.  Why are advance directives important?  An advance directive helps you control your care. Although spoken wishes may be used, it is better to have your wishes written down. Spoken wishes can be misunderstood, or not followed.  Treatments may be given even if you do not want them. An advance directive may make it easier for your family to make difficult choices about your care.   Weight Management   Why it is important to manage your weight:  Being overweight increases your risk of health conditions such as heart disease, high blood pressure, type 2 diabetes, and certain types of cancer. It can also increase your risk for osteoarthritis, sleep apnea, and other respiratory problems. Aim for a slow, steady weight loss. Even a small amount of weight loss can lower your risk of health problems.  How to lose weight safely:  A safe and healthy way to lose weight is to eat fewer calories and get regular exercise. You can lose up about 1 pound a week by decreasing the number of calories you eat by 500 calories each day.   Healthy meal plan for weight management:  A healthy meal plan includes a variety of foods, contains fewer calories, and helps you stay healthy. A healthy meal plan includes the following:  Eat whole-grain foods more often.  A healthy meal plan should contain fiber. Fiber is the part of grains, fruits, and vegetables that is not broken down by your body. Whole-grain foods are healthy and provide extra fiber in your diet. Some examples of whole-grain foods are whole-wheat breads and pastas, oatmeal, brown rice, and bulgur.  Eat a variety of vegetables every day.  Include dark, leafy greens such as spinach, kale, javier greens, and mustard greens. Eat yellow and orange vegetables such as carrots, sweet potatoes, and winter squash.   Eat a variety of fruits every day.  Choose fresh or canned fruit (canned in its own juice or light syrup) instead of juice. Fruit juice has very little or no fiber.  Eat low-fat dairy foods.  Drink fat-free (skim) milk or 1% milk. Eat fat-free yogurt and low-fat cottage cheese. Try low-fat cheeses such as mozzarella and other reduced-fat cheeses.  Choose meat and other protein foods that are low in fat.   Choose beans or other legumes such as split peas or lentils. Choose fish, skinless poultry (chicken or turkey), or lean cuts of red meat (beef or pork). Before you cook meat or poultry, cut off any visible fat.   Use less fat and oil.  Try baking foods instead of frying them. Add less fat, such as margarine, sour cream, regular salad dressing and mayonnaise to foods. Eat fewer high-fat foods. Some examples of high-fat foods include french fries, doughnuts, ice cream, and cakes.  Eat fewer sweets.  Limit foods and drinks that are high in sugar. This includes candy, cookies, regular soda, and sweetened drinks.  Exercise:  Exercise at least 30 minutes per day on most days of the week. Some examples of exercise include walking, biking, dancing, and swimming. You can also fit in more physical activity by taking the stairs instead of the elevator or parking farther away from stores. Ask your healthcare provider about the best exercise plan for you.      © Copyright [employer] 2018 Information is for End User's use only and may not be sold, redistributed or otherwise used for commercial purposes. All illustrations and images included in CareNotes® are the copyrighted property of [employer] or [employer]

## 2024-11-20 NOTE — PROGRESS NOTES
Diabetic Foot Exam    Name: Gloria Mitchell      : 1960      MRN: [id number]  Encounter Provider: Rito éMndez PA-C  Encounter Date: 2024   Encounter department: [facility]    Assessment & Plan  Medicare annual wellness visit, subsequent         Hypertension, essential  At goal today.  Continue lisinopril 10 mg daily.  Encouraged to take at home blood pressures and contact office if persistently elevated.       GERD without esophagitis  Stable on lifestyle changes.  Contact office with any worsening.       Type 2 diabetes mellitus with other specified complication, with long-term current use of insulin ([facility])  Given low blood sugars overnight and A1c at goal at 6.1.  Will discontinue glimepiride 2 mg at night.  Patient is to monitor blood glucose.  Is interested in CGM, will refer to clinical pharmacy for this.  Educated on protocol for hypoglycemia.  Lab Results   Component Value Date    HGBA1C 6.1 2024       Orders:    POCT hemoglobin A1c    Albumin / creatinine urine ratio; Future    Comprehensive metabolic panel; Future    CBC and differential; Future    Lipid Panel with Direct LDL reflex; Future    Ambulatory referral to clinical pharmacy; Future    rosuvastatin (CRESTOR) 5 mg tablet; Take 1 tablet (5 mg total) by mouth daily    Polyarticular arthritis  Stable on meloxicam.  Contact office with worsening.       Mixed hyperlipidemia  Given ASCVD risk score and diabetes history, will start Crestor 5 mg daily.  Will continue to monitor.  Educated on side effects of medication, and is to contact office if these present.  Orders:    Lipid Panel with Direct LDL reflex; Future    rosuvastatin (CRESTOR) 5 mg tablet; Take 1 tablet (5 mg total) by mouth daily    Morbid obesity with BMI of 40.0-44.9, adult ([facility])  Educated on healthy diet and activity.         Dry eye  Patient is educated on conservative measures of this, and may use Systane as needed like in the past.  If no  improvement or worsening, will refer to ophthalmology.       Asymptomatic menopausal state    Orders:    DXA bone density spine hip and pelvis; Future    Encounter for screening mammogram for breast cancer    Orders:    Mammo screening bilateral w 3d and cad; Future    Encounter for immunization  Completed.          Preventive health issues were discussed with patient, and age appropriate screening tests were ordered as noted in patient's After Visit Summary. Personalized health advice and appropriate referrals for health education or preventive services given if needed, as noted in patient's After Visit Summary.    History of Present Illness     Patient is a 64-year-old female presenting for Medicare annual wellness visit.  Patient states she is having worsening dry eye.  Previously stable on Systane and warm compresses.  Has not been doing warm compresses as much.  Patient also was getting low blood sugars overnight.  Did go back on glimepiride with some improvement, but is still getting them.       Patient Care Team:  Rito Méndez PA-C as PCP - General (Physician Assistant)    Review of Systems   Constitutional:  Negative for appetite change, chills, diaphoresis, fatigue and fever.   HENT:  Negative for congestion, ear discharge, ear pain, postnasal drip, rhinorrhea, sinus pressure, sinus pain, sneezing and sore throat.    Eyes:  Negative for pain, discharge, redness, itching and visual disturbance.        Dry eye   Respiratory:  Negative for apnea, cough, chest tightness, shortness of breath and wheezing.    Cardiovascular:  Negative for chest pain, palpitations and leg swelling.   Gastrointestinal:  Negative for abdominal pain, blood in stool, constipation, diarrhea, nausea and vomiting.   Endocrine: Negative for cold intolerance, heat intolerance, polydipsia and polyuria.   Genitourinary:  Negative for dysuria, flank pain, frequency, hematuria and urgency.   Musculoskeletal:  Negative for arthralgias, back  pain, myalgias, neck pain and neck stiffness.   Skin:  Negative for color change and rash.   Allergic/Immunologic: Negative for environmental allergies and food allergies.   Neurological:  Negative for dizziness, tremors, seizures, syncope, speech difficulty, weakness, light-headedness, numbness and headaches.   Hematological:  Negative for adenopathy. Does not bruise/bleed easily.   Psychiatric/Behavioral:  Negative for agitation, confusion, decreased concentration, dysphoric mood, hallucinations, self-injury, sleep disturbance and suicidal ideas. The patient is not nervous/anxious and is not hyperactive.    All other systems reviewed and are negative.    Medical History Reviewed by provider this encounter:  Tobacco  Allergies  Meds  Problems  Med Hx  Surg Hx  Fam Hx       Annual Wellness Visit Questionnaire       Health Risk Assessment:   Patient rates overall health as very good. Patient feels that their physical health rating is same. Patient is satisfied with their life. Eyesight was rated as slightly worse. Hearing was rated as same. Patient feels that their emotional and mental health rating is same. Patients states they are never, rarely angry. Patient states they are sometimes unusually tired/fatigued. Pain experienced in the last 7 days has been some. Patient's pain rating has been 2/10. Patient states that she has experienced no weight loss or gain in last 6 months.     Depression Screening:   PHQ-2 Score: 0      Fall Risk Screening:   In the past year, patient has experienced: no history of falling in past year      Urinary Incontinence Screening:   Patient has not leaked urine accidently in the last six months.     Home Safety:  Patient does not have trouble with stairs inside or outside of their home. Patient has working smoke alarms and has working carbon monoxide detector. Home safety hazards include: none.     Nutrition:   Current diet is Diabetic.     Medications:   Patient is currently  taking over-the-counter supplements. OTC medications include: see medication list. Patient is able to manage medications.     Activities of Daily Living (ADLs)/Instrumental Activities of Daily Living (IADLs):   Walk and transfer into and out of bed and chair?: Yes  Dress and groom yourself?: Yes    Bathe or shower yourself?: Yes    Feed yourself? Yes  Do your laundry/housekeeping?: Yes  Manage your money, pay your bills and track your expenses?: Yes  Make your own meals?: Yes    Do your own shopping?: Yes    Previous Hospitalizations:   Any hospitalizations or ED visits within the last 12 months?: Yes    How many hospitalizations have you had in the last year?: 1-2    Advance Care Planning:     End of Life Decisions reviewed with patient: Yes    Provider agrees with end of life decisions: Yes      Cognitive Screening:   Provider or family/friend/caregiver concerned regarding cognition?: No    PREVENTIVE SCREENINGS      Cardiovascular Screening:    General: Screening Not Indicated and History Lipid Disorder      Diabetes Screening:     General: Screening Not Indicated and History Diabetes      Colorectal Cancer Screening:     General: Risks and Benefits Discussed and Patient Declines      Breast Cancer Screening:     General: Risks and Benefits Discussed    Due for: Mammogram        Cervical Cancer Screening:    General: Screening Not Indicated      Osteoporosis Screening:    General: Risks and Benefits Discussed      Lung Cancer Screening:     General: Screening Not Indicated    Screening, Brief Intervention, and Referral to Treatment (SBIRT)    Screening      AUDIT-C Screenin) How often did you have a drink containing alcohol in the past year? never  2) How many drinks did you have on a typical day when you were drinking in the past year? 0  3) How often did you have 6 or more drinks on one occasion in the past year? never    AUDIT-C Score: 0  Interpretation: Score 0-2 (female): Negative screen for alcohol  misuse    Single Item Drug Screening:  How often have you used an illegal drug (including marijuana) or a prescription medication for non-medical reasons in the past year? never    Single Item Drug Screen Score: 0  Interpretation: Negative screen for possible drug use disorder    Brief Intervention  Alcohol & drug use screenings were reviewed. No concerns regarding substance use disorder identified.     Other Counseling Topics:   Car/seat belt/driving safety, skin self-exam, sunscreen and calcium and vitamin D intake and regular weightbearing exercise.     Social Drivers of Health     Financial Resource Strain: Medium Risk (10/20/2023)    Overall Financial Resource Strain (CARDIA)     Difficulty of Paying Living Expenses: Somewhat hard   Food Insecurity: No Food Insecurity (11/20/2024)    Hunger Vital Sign     Worried About Running Out of Food in the Last Year: Never true     Ran Out of Food in the Last Year: Never true   Transportation Needs: No Transportation Needs (11/20/2024)    PRAPARE - Transportation     Lack of Transportation (Medical): No     Lack of Transportation (Non-Medical): No   Housing Stability: Low Risk  (11/20/2024)    Housing Stability Vital Sign     Unable to Pay for Housing in the Last Year: No     Number of Times Moved in the Last Year: 1     Homeless in the Last Year: No   Utilities: Not At Risk (11/20/2024)    [facility] Utilities     Threatened with loss of utilities: No     No results found.    Objective   /63 (BP Location: Left arm, Patient Position: Sitting)   Pulse 76   Temp (!) 97 °F (36.1 °C) (Tympanic)   Ht 5' (1.524 m)   Wt 89.6 kg (197 lb 9.6 oz)   SpO2 98%   BMI 38.59 kg/m²     Physical Exam  Vitals and nursing note reviewed.   Constitutional:       General: She is not in acute distress.     Appearance: Normal appearance. She is well-developed. She is obese. She is not ill-appearing, toxic-appearing or diaphoretic.   HENT:      Head: Normocephalic and atraumatic.      Right  Ear: Tympanic membrane normal.      Left Ear: Tympanic membrane normal.      Nose: Nose normal.      Mouth/Throat:      Mouth: Mucous membranes are moist.      Pharynx: Oropharynx is clear.   Eyes:      Conjunctiva/sclera: Conjunctivae normal.      Pupils: Pupils are equal, round, and reactive to light.   Cardiovascular:      Rate and Rhythm: Normal rate and regular rhythm.      Pulses: Normal pulses. no weak pulses.           Dorsalis pedis pulses are 2+ on the right side and 2+ on the left side.        Posterior tibial pulses are 2+ on the right side and 2+ on the left side.      Heart sounds: Normal heart sounds. No murmur heard.  Pulmonary:      Effort: Pulmonary effort is normal. No respiratory distress.      Breath sounds: Normal breath sounds. No wheezing.   Chest:      Chest wall: No tenderness.   Abdominal:      General: Bowel sounds are normal.      Palpations: Abdomen is soft. There is no mass.      Tenderness: There is no abdominal tenderness.   Musculoskeletal:         General: No swelling or tenderness. Normal range of motion.      Cervical back: Normal range of motion and neck supple. No tenderness.      Right lower leg: No edema.      Left lower leg: No edema.   Feet:      Right foot:      Skin integrity: No ulcer, skin breakdown, erythema, warmth, callus or dry skin.      Left foot:      Skin integrity: No ulcer, skin breakdown, erythema, warmth, callus or dry skin.   Lymphadenopathy:      Cervical: No cervical adenopathy.   Skin:     General: Skin is warm and dry.      Capillary Refill: Capillary refill takes less than 2 seconds.      Findings: No erythema, lesion or rash.   Neurological:      General: No focal deficit present.      Mental Status: She is alert and oriented to person, place, and time. Mental status is at baseline.      Motor: No weakness.      Coordination: Coordination normal.      Gait: Gait normal.   Psychiatric:         Mood and Affect: Mood normal.         Behavior: Behavior  normal.         Thought Content: Thought content normal.         Judgment: Judgment normal.     Patient's shoes and socks removed.    Right Foot/Ankle   Right Foot Inspection  Skin Exam: skin normal and skin intact. No dry skin, no warmth, no callus, no erythema, no maceration, no abnormal color, no pre-ulcer, no ulcer and no callus.     Toe Exam: ROM and strength within normal limits.     Sensory   Monofilament testing: intact    Vascular  Capillary refills: < 3 seconds  The right DP pulse is 2+. The right PT pulse is 2+.     Left Foot/Ankle  Left Foot Inspection  Skin Exam: skin normal and skin intact. No dry skin, no warmth, no erythema, no maceration, normal color, no pre-ulcer, no ulcer and no callus.     Toe Exam: ROM and strength within normal limits.     Sensory   Monofilament testing: intact    Vascular  Capillary refills: < 3 seconds  The left DP pulse is 2+. The left PT pulse is 2+.     Assign Risk Category  No deformity present  No loss of protective sensation  No weak pulses  Risk: 0

## 2024-11-20 NOTE — ASSESSMENT & PLAN NOTE
Given ASCVD risk score and diabetes history, will start Crestor 5 mg daily.  Will continue to monitor.  Educated on side effects of medication, and is to contact office if these present.  Orders:    Lipid Panel with Direct LDL reflex; Future    rosuvastatin (CRESTOR) 5 mg tablet; Take 1 tablet (5 mg total) by mouth daily

## 2024-11-20 NOTE — ASSESSMENT & PLAN NOTE
Given low blood sugars overnight and A1c at goal at 6.1.  Will discontinue glimepiride 2 mg at night.  Patient is to monitor blood glucose.  Is interested in CGM, will refer to clinical pharmacy for this.  Educated on protocol for hypoglycemia.  Lab Results   Component Value Date    HGBA1C 6.1 11/20/2024       Orders:    POCT hemoglobin A1c    Albumin / creatinine urine ratio; Future    Comprehensive metabolic panel; Future    CBC and differential; Future    Lipid Panel with Direct LDL reflex; Future    Ambulatory referral to clinical pharmacy; Future    rosuvastatin (CRESTOR) 5 mg tablet; Take 1 tablet (5 mg total) by mouth daily

## 2024-11-20 NOTE — ASSESSMENT & PLAN NOTE
Patient is educated on conservative measures of this, and may use Systane as needed like in the past.  If no improvement or worsening, will refer to ophthalmology.

## 2024-11-21 ENCOUNTER — RESULTS FOLLOW-UP (OUTPATIENT)
Dept: FAMILY MEDICINE CLINIC | Facility: CLINIC | Age: 64
End: 2024-11-21

## 2024-11-25 NOTE — TELEPHONE ENCOUNTER
Please contact patient to schedule Clinical Pharmacist Appointment     Reason for appointment: diabetes  When to schedule with Pharmacist: as soon as available  What should the patient bring to the appointment: med list and glucose log    Appointment Department:  [facility]  Pharmacist patient will be seeing: Jamal De Anda  Visit Type Preference (ie Phone, Video, In-person): no preference   In-person visits will be at: [facility]  Virtual visits will be completed via [employer] or Phone - please clarify patient preference in appointment notes    Please respond to this note to keep track of the number of patient outreaches.     Please try to reach out to patient on 2 separate days

## 2024-11-25 NOTE — TELEPHONE ENCOUNTER
Left Vm for patient to call to schedule a diabetic consult with Shalini stark [place] or virtual visit .

## 2024-12-03 ENCOUNTER — CLINICAL SUPPORT (OUTPATIENT)
Dept: FAMILY MEDICINE CLINIC | Facility: CLINIC | Age: 64
End: 2024-12-03

## 2024-12-03 DIAGNOSIS — E11.69 TYPE 2 DIABETES MELLITUS WITH OTHER SPECIFIED COMPLICATION, WITH LONG-TERM CURRENT USE OF INSULIN (HCC): ICD-10-CM

## 2024-12-03 DIAGNOSIS — Z79.4 TYPE 2 DIABETES MELLITUS WITH OTHER SPECIFIED COMPLICATION, WITH LONG-TERM CURRENT USE OF INSULIN (HCC): ICD-10-CM

## 2024-12-03 LAB
DME PARACHUTE DELIVERY DATE REQUESTED: NORMAL
DME PARACHUTE ITEM DESCRIPTION: NORMAL
DME PARACHUTE ITEM DESCRIPTION: NORMAL
DME PARACHUTE ORDER STATUS: NORMAL
DME PARACHUTE SUPPLIER NAME: NORMAL
DME PARACHUTE SUPPLIER PHONE: NORMAL

## 2024-12-03 PROCEDURE — PBNCHG PB NO CHARGE PLACEHOLDER: Performed by: PHARMACIST

## 2024-12-03 NOTE — TELEPHONE ENCOUNTER
Left msg for patient to call the office to reschedule her appointment for feb 10th Per lexis Gardner for Ortho referral (Dr. Cortés). They will handle xrays and further treatment. Orders placed. Will need to contact patient to inform.

## 2024-12-03 NOTE — ASSESSMENT & PLAN NOTE
Lab Results   Component Value Date    HGBA1C 6.1 11/20/2024     Goal A1c <7% .   Complications:  Microvascular complications: None noted at this time  Macrovascular complications: None noted at this time  Current Diabetes Regimen:  Glimepiride 2 mg daily  Trulicity 0.75 mg weekly   Metformin 1000 mg BID  Historical DM Meds (reason for discontinuation):  Ozempic (diarrhea)  On Additional Therapies:  Statin: yes  ACEI/ARB: yes    Assessment:  Patient's A1c is well-controlled however she is experiencing hypoglycemia events her glimepiride was decreased in response to this.  She would benefit from CGM for closer monitoring of blood glucose and alerting her to low glucose events while sleeping and overnight.  She has had multiple events where reading dropped below 70 mg/dL overnight.    Date Glucose (mg/dL)   8/13/24 60   8/19/24 58   9/15/24 56   10/20 67   11/1 58   11/20 69       Patient was previously enrolled in [employer] however her income this past year put her above their criteria and she was denied inclusion in program for next year.  I did look into Trulicity patient assistance program.  Program is only excepting new applicants if they meet certain criteria on the medical exception request form.    -History of type 2 diabetes  -Currently on Trulicity (started on Joao 10, 2024)  -Age greater than 10  -Within past 12 months tried and failed another GLP-1 agonist (Severe side effects on Ozempic including diarrhea. Medication was discontinued in Joao,10 2024)  -Currently on max dose metformin, requires multiple medications for A1c control  Patient does meet all of the criteria therefore will apply for program.      Changes to medication regimen:  Dexcom G7 order placed through [employer] ([facility])  Will apply for Trulicity patient assistance program.      Orders:    Ambulatory referral to clinical pharmacy

## 2024-12-03 NOTE — PROGRESS NOTES
[facility] Clinical Pharmacy Services  Shalini Corbin, Pharmacist    Assessment/ Plan     Assessment & Plan  Type 2 diabetes mellitus with other specified complication, with long-term current use of insulin ([facility])    Lab Results   Component Value Date    HGBA1C 6.1 11/20/2024     Goal A1c <7% .   Complications:  Microvascular complications: None noted at this time  Macrovascular complications: None noted at this time  Current Diabetes Regimen:  Glimepiride 2 mg daily  Trulicity 0.75 mg weekly   Metformin 1000 mg BID  Historical DM Meds (reason for discontinuation):  Ozempic (diarrhea)  On Additional Therapies:  Statin: yes  ACEI/ARB: yes    Assessment:  Patient's A1c is well-controlled however she is experiencing hypoglycemia events her glimepiride was decreased in response to this.  She would benefit from CGM for closer monitoring of blood glucose and alerting her to low glucose events while sleeping and overnight.  She has had multiple events where reading dropped below 70 mg/dL overnight.    Date Glucose (mg/dL)   8/13/24 60   8/19/24 58   9/15/24 56   10/20 67   11/1 58   11/20 69       Patient was previously enrolled in [employer] however her income this past year put her above their criteria and she was denied inclusion in program for next year.  I did look into Trulicity patient assistance program.  Program is only excepting new applicants if they meet certain criteria on the medical exception request form.    -History of type 2 diabetes  -Currently on Trulicity (started on Joao 10, 2024)  -Age greater than 10  -Within past 12 months tried and failed another GLP-1 agonist (Severe side effects on Ozempic including diarrhea. Medication was discontinued in Joao,10 2024)  -Currently on max dose metformin, requires multiple medications for A1c control  Patient does meet all of the criteria therefore will apply for program.      Changes to medication regimen:  Dexcom G7 order placed through [employer] ([employer]  [facility])  Will apply for Trulicity patient assistance program.      Orders:    Ambulatory referral to clinical pharmacy      Follow-up: 2.5 weeks    Subjective   HPI    Medication Adherence/ Tolerability/ Cost:  Patient denies side effects  glimepiride  lisinopril  meloxicam  metFORMIN  multivitamin Tabs  rosuvastatin  [facility]  Vitamin C Tabs      Review of Systems   Gastrointestinal:  Negative for abdominal pain, constipation, diarrhea, nausea and vomiting.        2. Lifestyle:   Patient will eat peanut butter before bed just to prevent blood sugar from dropping overnight    3. Home monitoring devices  Glucometer: Yes    Objective       Blood Sugar Readings  The patient is currently checking blood glucose 1-2 times per day. Patient reports with [facility] logs.    Date Glucose (mg/dL)   8/13/24 60   8/19/24 58   9/15/24 56   10/20 67   11/1 58   11/20 69   Avg          ASCVD Risk:  The 10-year ASCVD risk score (Mynor ANTONIO, et al., 2019) is: 13.3%    Values used to calculate the score:      Age: 64 years      Sex: Female      Is Non- : No      Diabetic: Yes      Tobacco smoker: No      Systolic Blood Pressure: 126 mmHg      Is BP treated: Yes      HDL Cholesterol: 60 mg/dL      Total Cholesterol: 166 mg/dL     Vitals:  There were no vitals filed for this visit.    Eye Exam:    Lab Results   Component Value Date    LEFTDIABRET None 01/31/2024    RIGHTDIABRET None 01/31/2024       Labs:  Lab Results   Component Value Date    SODIUM 139 11/19/2024    K 4.4 11/19/2024    EGFR 64 11/19/2024    CREATININE 0.97 11/19/2024    BCXGIAQM60 819 09/12/2019    MICROALBCRE  11/20/2024      Comment:      Unable to calculate.       Lab Results   Component Value Date    HGBA1C 6.1 11/20/2024    HGBA1C 6.1 07/17/2024    HGBA1C 6.1 03/07/2024       Telemedicine consent  The patient was identified by name and date of birth. Gloria Mitchell was informed that this is a telemedicine visit and that the visit is being  conducted through Telephone.  My office door was closed. No one else was in the room.  She acknowledged consent and understanding of privacy and security of the video platform. The patient has agreed to participate and understands they can discontinue the visit at any time.    Pharmacist Tracking Tool     Pharmacist Tracking Tool  Reason For Outreach: Embedded Pharmacist  Demographics:  Intervention Method: Phone  Type of Intervention: Follow-Up  Topics Addressed: Diabetes  Pharmacologic Interventions: Medication Initiation and Prevent or Manage CARLO  Non-Pharmacologic Interventions: Care coordination, Cost, Disease state education, Home Monitoring, Medication/Device education, and Personal CGM  Time:  Direct Patient Care:  30  mins  Care Coordination:  30  mins  Recommendation Recipient: Patient/Caregiver and Provider  Outcome: Accepted

## 2024-12-10 ENCOUNTER — VBI (OUTPATIENT)
Dept: ADMINISTRATIVE | Facility: OTHER | Age: 64
End: 2024-12-10

## 2024-12-10 ENCOUNTER — TELEPHONE (OUTPATIENT)
Dept: FAMILY MEDICINE CLINIC | Facility: CLINIC | Age: 64
End: 2024-12-10

## 2024-12-10 NOTE — TELEPHONE ENCOUNTER
12/10/24 2:52 PM     Chart reviewed for CRC: Colonoscopy was/were not submitted to the patient's insurance.     Harjit Menezes MA   [facility]

## 2024-12-16 ENCOUNTER — RA CDI HCC (OUTPATIENT)
Dept: OTHER | Facility: HOSPITAL | Age: 64
End: 2024-12-16

## 2024-12-20 NOTE — ASSESSMENT & PLAN NOTE
Lab Results   Component Value Date    HGBA1C 6.1 11/20/2024     Goal A1c <7% .   Complications:  Microvascular complications: None noted at this time  Macrovascular complications: None noted at this time  Current Diabetes Regimen:  Glimepiride 2 mg daily  Trulicity 0.75 mg weekly   Metformin 1000 mg BID  Historical DM Meds (reason for discontinuation):  Ozempic (diarrhea)  On Additional Therapies:  Statin: yes  ACEI/ARB: yes     Assessment:  Dexcom g7 order was canceled because they needed more information regarding hypoglycemia. Required 2 events <54 mg/dL. Supporting documentation provided.     Application for Trulicity was mailed to patient's home.     Changes to medication regimen:  No changes to medications today.    Dexcom G7 order placed again through [facility] ([facility] [place] [facility]) with updated information  She received Trulicity program paperwork and completed her portion. She will drop off at [place] office today.

## 2024-12-20 NOTE — PROGRESS NOTES
[facility] Clinical Pharmacy Services  Shalini Corbin, Pharmacist    Assessment/ Plan     Assessment & Plan  Type 2 diabetes mellitus with other specified complication, with long-term current use of insulin ([facility])    Lab Results   Component Value Date    HGBA1C 6.1 11/20/2024     Goal A1c <7% .   Complications:  Microvascular complications: None noted at this time  Macrovascular complications: None noted at this time  Current Diabetes Regimen:  Glimepiride 2 mg daily  Trulicity 0.75 mg weekly   Metformin 1000 mg BID  Historical DM Meds (reason for discontinuation):  Ozempic (diarrhea)  On Additional Therapies:  Statin: yes  ACEI/ARB: yes     Assessment:  Dexcom g7 order was canceled because they needed more information regarding hypoglycemia. Required 2 events <54 mg/dL. Supporting documentation provided.     Application for Trulicity was mailed to patient's home.     Changes to medication regimen:  No changes to medications today.    Dexcom G7 order placed again through [place] ([employer]) with updated information  She received Trulicity program paperwork and completed her portion. She will drop off at [place] office today.          Follow-up: 3 weeks    Subjective   HPI    Medication Adherence/ Tolerability/ Cost:  Patient denies side effects  glimepiride  lisinopril  meloxicam  metFORMIN  multivitamin Tabs  rosuvastatin  Trulicity Soaj  Vitamin C Tabs      Review of Systems   Gastrointestinal:  Negative for abdominal pain, constipation, diarrhea, nausea and vomiting.        2. Lifestyle:   Patient will eat peanut butter before bed just to prevent blood sugar from dropping overnight    3. Home monitoring devices  Glucometer: Yes    Objective       Blood Sugar Readings  The patient is currently checking blood glucose 1-2 times per day. Patient reports with SMBG logs.    Date Glucose (mg/dL)   12/10 111    127    105    93    122    102    85    77    95    83   Avg          ASCVD Risk:  The 10-year ASCVD  risk score (Mynor ANTONIO et al., 2019) is: 13.3%    Values used to calculate the score:      Age: 64 years      Sex: Female      Is Non- : No      Diabetic: Yes      Tobacco smoker: No      Systolic Blood Pressure: 126 mmHg      Is BP treated: Yes      HDL Cholesterol: 60 mg/dL      Total Cholesterol: 166 mg/dL     Vitals:  There were no vitals filed for this visit.    Eye Exam:    Lab Results   Component Value Date    LEFTDIABRET None 01/31/2024    RIGHTDIABRET None 01/31/2024       Labs:  Lab Results   Component Value Date    SODIUM 139 11/19/2024    K 4.4 11/19/2024    EGFR 64 11/19/2024    CREATININE 0.97 11/19/2024    [id number] 819 09/12/2019    MICROALBCRE  11/20/2024      Comment:      Unable to calculate.       Lab Results   Component Value Date    HGBA1C 6.1 11/20/2024    HGBA1C 6.1 07/17/2024    HGBA1C 6.1 03/07/2024       Telemedicine consent  The patient was identified by name and date of birth. Gloria Mitchell was informed that this is a telemedicine visit and that the visit is being conducted through Telephone.  My office door was closed. No one else was in the room.  She acknowledged consent and understanding of privacy and security of the video platform. The patient has agreed to participate and understands they can discontinue the visit at any time.    Pharmacist Tracking Tool     Pharmacist Tracking Tool  Reason For Outreach: Embedded Pharmacist  Demographics:  Intervention Method: Phone  Type of Intervention: Follow-Up  Topics Addressed: Diabetes  Pharmacologic Interventions: N/A  Non-Pharmacologic Interventions: Care coordination, Cost, Disease state education, Home Monitoring, Medication/Device education, and Personal CGM  Time:  Direct Patient Care:  15  mins  Care Coordination:  5  mins  Recommendation Recipient: Patient/Caregiver and Provider  Outcome: Accepted

## 2024-12-23 ENCOUNTER — TELEMEDICINE (OUTPATIENT)
Dept: FAMILY MEDICINE CLINIC | Facility: CLINIC | Age: 64
End: 2024-12-23

## 2024-12-23 DIAGNOSIS — E11.69 TYPE 2 DIABETES MELLITUS WITH OTHER SPECIFIED COMPLICATION, WITH LONG-TERM CURRENT USE OF INSULIN (HCC): Primary | ICD-10-CM

## 2024-12-23 DIAGNOSIS — Z79.4 TYPE 2 DIABETES MELLITUS WITH OTHER SPECIFIED COMPLICATION, WITH LONG-TERM CURRENT USE OF INSULIN (HCC): Primary | ICD-10-CM

## 2024-12-23 PROCEDURE — PBNCHG PB NO CHARGE PLACEHOLDER: Performed by: PHARMACIST

## 2024-12-28 DIAGNOSIS — E11.9 TYPE 2 DIABETES MELLITUS WITHOUT COMPLICATION, WITHOUT LONG-TERM CURRENT USE OF INSULIN (HCC): ICD-10-CM

## 2024-12-30 LAB
DME PARACHUTE DELIVERY DATE ACTUAL: NORMAL
DME PARACHUTE DELIVERY DATE EXPECTED: NORMAL
DME PARACHUTE DELIVERY DATE REQUESTED: NORMAL
DME PARACHUTE ITEM DESCRIPTION: NORMAL
DME PARACHUTE ITEM DESCRIPTION: NORMAL
DME PARACHUTE ORDER STATUS: NORMAL
DME PARACHUTE SUPPLIER NAME: NORMAL
DME PARACHUTE SUPPLIER PHONE: NORMAL

## 2025-01-10 ENCOUNTER — RA CDI HCC (OUTPATIENT)
Dept: OTHER | Facility: HOSPITAL | Age: 65
End: 2025-01-10

## 2025-01-13 ENCOUNTER — TELEMEDICINE (OUTPATIENT)
Dept: FAMILY MEDICINE CLINIC | Facility: CLINIC | Age: 65
End: 2025-01-13

## 2025-01-13 DIAGNOSIS — Z79.4 TYPE 2 DIABETES MELLITUS WITH OTHER SPECIFIED COMPLICATION, WITH LONG-TERM CURRENT USE OF INSULIN (HCC): Primary | ICD-10-CM

## 2025-01-13 DIAGNOSIS — E11.69 TYPE 2 DIABETES MELLITUS WITH OTHER SPECIFIED COMPLICATION, WITH LONG-TERM CURRENT USE OF INSULIN (HCC): Primary | ICD-10-CM

## 2025-01-13 PROCEDURE — PBNCHG PB NO CHARGE PLACEHOLDER: Performed by: PHARMACIST

## 2025-01-13 NOTE — ASSESSMENT & PLAN NOTE
Lab Results   Component Value Date    HGBA1C 6.1 11/20/2024     Goal A1c <7% .   Complications:  Microvascular complications: None noted at this time  Macrovascular complications: None noted at this time  Current Diabetes Regimen:  Glimepiride 2 mg daily  Trulicity 0.75 mg weekly   Metformin 1000 mg BID  Historical DM Meds (reason for discontinuation):  Ozempic (diarrhea)  On Additional Therapies:  Statin: yes  ACEI/ARB: yes     Assessment:  Dexcom g7 ordered with supporting documentation showing 2 events <54 mg/dL. She did receive Dexcom but has not set it up yet. Offered to schedule visit with clinical pharmacist for CGM application but she has a friend that has a Dexcom who will help her.      Application for Trulicity was faxed to program on 1/2/25. At this time no update on status. Re-faxed today. Advised she calls program in 1 week if she does not hear update. She is also going to reapply for [employer].      Changes to medication regimen:  No changes to medications today.

## 2025-01-13 NOTE — PROGRESS NOTES
[facility] Clinical Pharmacy Services  Jamal De Anda    Administrative Statements   Encounter provider Jamal De Anda    The Patient is located at Home and in the following state in which I hold an active license [place].    The patient was identified by name and date of birth. Gloria Mitchell was informed that this is a telemedicine visit and that the visit is being conducted through Telephone.  My office door was closed. No one else was in the room.  She acknowledged consent and understanding of privacy and security of the video platform. The patient has agreed to participate and understands they can discontinue the visit at any time.    Assessment/ Plan     Assessment & Plan  Type 2 diabetes mellitus with other specified complication, with long-term current use of insulin ([facility])    Lab Results   Component Value Date    HGBA1C 6.1 11/20/2024     Goal A1c <7% .   Complications:  Microvascular complications: None noted at this time  Macrovascular complications: None noted at this time  Current Diabetes Regimen:  Glimepiride 2 mg daily  Trulicity 0.75 mg weekly   Metformin 1000 mg BID  Historical DM Meds (reason for discontinuation):  Ozempic (diarrhea)  On Additional Therapies:  Statin: yes  ACEI/ARB: yes     Assessment:  Dexcom g7 ordered with supporting documentation showing 2 events <54 mg/dL. She did receive Dexcom but has not set it up yet. Offered to schedule visit with clinical pharmacist for CGM application but she has a friend that has a Dexcom who will help her.      Application for Trulicity was faxed to program on 1/2/25. At this time no update on status. Re-faxed today. Advised she calls program in 1 week if she does not hear update. She is also going to reapply for [employer].      Changes to medication regimen:  No changes to medications today.           Follow-up: 4 weeks    Subjective   HPI    Medication Adherence/ Tolerability/ Cost:  Patient denies side  effects  glimepiride  lisinopril  meloxicam  metFORMIN  multivitamin Tabs  rosuvastatin  Trulicity Soaj  Vitamin C Tabs      Review of Systems   Gastrointestinal:  Negative for abdominal pain, constipation, diarrhea, nausea and vomiting.        2. Lifestyle:   Patient will eat peanut butter before bed just to prevent blood sugar from dropping overnight    3. Home monitoring devices  Glucometer: Yes    Objective       Blood Sugar Readings  The patient is currently checking blood glucose 1-2 times per day. Patient reports with SMBG logs.    Bedtime  mg/dL  Morning  mg/dL   No readings <70 mg/dL.       ASCVD Risk:  The 10-year ASCVD risk score (Mynor ANTONIO, et al., 2019) is: 13.3%    Values used to calculate the score:      Age: 64 years      Sex: Female      Is Non- : No      Diabetic: Yes      Tobacco smoker: No      Systolic Blood Pressure: 126 mmHg      Is BP treated: Yes      HDL Cholesterol: 60 mg/dL      Total Cholesterol: 166 mg/dL     Vitals:  There were no vitals filed for this visit.    Eye Exam:    Lab Results   Component Value Date    LEFTDIABRET None 01/31/2024    RIGHTDIABRET None 01/31/2024       Labs:  Lab Results   Component Value Date    SODIUM 139 11/19/2024    K 4.4 11/19/2024    EGFR 64 11/19/2024    CREATININE 0.97 11/19/2024    [id number] 819 09/12/2019    MICROALBCRE  11/20/2024      Comment:      Unable to calculate.       Lab Results   Component Value Date    HGBA1C 6.1 11/20/2024    HGBA1C 6.1 07/17/2024    HGBA1C 6.1 03/07/2024       Pharmacist Tracking Tool     Pharmacist Tracking Tool  Reason For Outreach: Embedded Pharmacist  Demographics:  Intervention Method: Phone  Type of Intervention: Follow-Up  Topics Addressed: Diabetes  Pharmacologic Interventions: N/A  Non-Pharmacologic Interventions: Care coordination, Cost, Disease state education, Home Monitoring, Medication/Device education, and Personal CGM  Time:  Direct Patient Care:  15  mins  Care  Coordination:  5  mins  Recommendation Recipient: Patient/Caregiver and Provider  Outcome: Accepted

## 2025-01-28 ENCOUNTER — HOSPITAL ENCOUNTER (OUTPATIENT)
Dept: RADIOLOGY | Facility: CLINIC | Age: 65
Discharge: HOME | End: 2025-01-28
Payer: COMMERCIAL

## 2025-01-28 VITALS — HEIGHT: 60 IN | WEIGHT: 197 LBS | BODY MASS INDEX: 38.68 KG/M2

## 2025-01-28 VITALS — BODY MASS INDEX: 40 KG/M2 | WEIGHT: 198.4 LBS | HEIGHT: 59 IN

## 2025-01-28 DIAGNOSIS — Z12.31 ENCOUNTER FOR SCREENING MAMMOGRAM FOR BREAST CANCER: ICD-10-CM

## 2025-01-28 DIAGNOSIS — Z78.0 ASYMPTOMATIC MENOPAUSAL STATE: ICD-10-CM

## 2025-01-28 PROCEDURE — 77063 BREAST TOMOSYNTHESIS BI: CPT

## 2025-01-28 PROCEDURE — 77067 SCR MAMMO BI INCL CAD: CPT

## 2025-01-28 PROCEDURE — 77080 DXA BONE DENSITY AXIAL: CPT

## 2025-01-30 PROBLEM — M85.832 OSTEOPENIA OF LEFT FOREARM: Status: ACTIVE | Noted: 2025-01-30

## 2025-02-10 ENCOUNTER — TELEMEDICINE (OUTPATIENT)
Dept: FAMILY MEDICINE CLINIC | Facility: CLINIC | Age: 65
End: 2025-02-10

## 2025-02-10 DIAGNOSIS — E11.69 TYPE 2 DIABETES MELLITUS WITH OTHER SPECIFIED COMPLICATION, WITH LONG-TERM CURRENT USE OF INSULIN (HCC): Primary | ICD-10-CM

## 2025-02-10 DIAGNOSIS — Z79.4 TYPE 2 DIABETES MELLITUS WITH OTHER SPECIFIED COMPLICATION, WITH LONG-TERM CURRENT USE OF INSULIN (HCC): Primary | ICD-10-CM

## 2025-02-10 PROCEDURE — PBNCHG PB NO CHARGE PLACEHOLDER: Performed by: PHARMACIST

## 2025-02-10 NOTE — ASSESSMENT & PLAN NOTE
Lab Results   Component Value Date    HGBA1C 6.1 11/20/2024     Goal A1c <7% .   Complications:  Microvascular complications: None noted at this time  Macrovascular complications: None noted at this time  Current Diabetes Regimen:  Glimepiride 2 mg daily  Trulicity 0.75 mg weekly   Metformin 1000 mg BID  Historical DM Meds (reason for discontinuation):  Ozempic (diarrhea)  On Additional Therapies:  Statin: yes  ACEI/ARB: yes  Glucose control on CGM report  Within goal range of 70 - 180 mg/dL 87% of time (goal >70% of time)  Average glucose 124 mg/dL (goal <154 mg/dL)   GMI 6.3%       Assessment:    Dexcom shows excellent glucose control based on time in range of 87%.  She does have low blood sugar events less than 70 mg/dL frequently despite glimepiride dose being decreased.  She also reports concern for hyperglycemia after breakfast.  After discussing with her it sounds like breakfast may be high in carbohydrates and we discussed options to lower carbohydrate count to stay within the recommended goal.    Her sulfonylurea is most likely contributing to her hypoglycemia.  Also not a by bad idea to stop due to age above 65.  She is concerned that she will have elevated blood sugar readings in response to this.  We went over glucose goals and time in range and pointed out that she has room for her numbers to increase while still remaining well-controlled.  If needed we can also optimize Trulicity dose further.     Changes to medication regimen:  Discontinue glimepiride  Continue Trulicity and metformin

## 2025-02-10 NOTE — PROGRESS NOTES
[facility] Clinical Pharmacy Services  Jamal De Anda    Administrative Statements   Encounter provider Jamal De Anda    The Patient is located at Home and in the following state in which I hold an active license [place].    The patient was identified by name and date of birth. Gloria Mitchell was informed that this is a telemedicine visit and that the visit is being conducted through Telephone.  My office door was closed. No one else was in the room.  She acknowledged consent and understanding of privacy and security of the video platform. The patient has agreed to participate and understands they can discontinue the visit at any time.    Assessment/ Plan     Assessment & Plan  Type 2 diabetes mellitus with other specified complication, with long-term current use of insulin ([facility])    Lab Results   Component Value Date    HGBA1C 6.1 11/20/2024     Goal A1c <7% .   Complications:  Microvascular complications: None noted at this time  Macrovascular complications: None noted at this time  Current Diabetes Regimen:  Glimepiride 2 mg daily  Trulicity 0.75 mg weekly   Metformin 1000 mg BID  Historical DM Meds (reason for discontinuation):  Ozempic (diarrhea)  On Additional Therapies:  Statin: yes  ACEI/ARB: yes  Glucose control on CGM report  Within goal range of 70 - 180 mg/dL 87% of time (goal >70% of time)  Average glucose 124 mg/dL (goal <154 mg/dL)   GMI 6.3%       Assessment:    Dexcom shows excellent glucose control based on time in range of 87%.  She does have low blood sugar events less than 70 mg/dL frequently despite glimepiride dose being decreased.  She also reports concern for hyperglycemia after breakfast.  After discussing with her it sounds like breakfast may be high in carbohydrates and we discussed options to lower carbohydrate count to stay within the recommended goal.    Her sulfonylurea is most likely contributing to her hypoglycemia.  Also not a by bad idea to stop due to age above  65.  She is concerned that she will have elevated blood sugar readings in response to this.  We went over glucose goals and time in range and pointed out that she has room for her numbers to increase while still remaining well-controlled.  If needed we can also optimize Trulicity dose further.     Changes to medication regimen:  Discontinue glimepiride  Continue Trulicity and metformin         Follow-up: 4 weeks    Subjective   HPI    Medication Adherence/ Tolerability/ Cost:  Patient denies side effects  calcium carbonate-vitamin D  glimepiride  lisinopril  meloxicam  metFORMIN  multivitamin Tabs  rosuvastatin  Trulicity Soaj  Vitamin C Tabs    Cost-approved for patient assistance program for Trulicity for 2025-year    Review of Systems   Gastrointestinal:  Negative for abdominal pain, constipation, diarrhea, nausea and vomiting.        2. Lifestyle:   Breakfast 8-9 am:eats cereal milk and banana ; yesterday cottage cheese with bagel     3. Home monitoring devices  Glucometer: Yes    Objective       Blood Sugar Readings                    ASCVD Risk:  The 10-year ASCVD risk score (Mynor ANTONIO, et al., 2019) is: 13.3%    Values used to calculate the score:      Age: 64 years      Sex: Female      Is Non- : No      Diabetic: Yes      Tobacco smoker: No      Systolic Blood Pressure: 126 mmHg      Is BP treated: Yes      HDL Cholesterol: 60 mg/dL      Total Cholesterol: 166 mg/dL     Vitals:  There were no vitals filed for this visit.    Eye Exam:    Lab Results   Component Value Date    LEFTDIABRET None 01/31/2024    RIGHTDIABRET None 01/31/2024       Labs:  Lab Results   Component Value Date    SODIUM 139 11/19/2024    K 4.4 11/19/2024    EGFR 64 11/19/2024    CREATININE 0.97 11/19/2024    [id number] 819 09/12/2019    MICROALBCRE  11/20/2024      Comment:      Unable to calculate.       Lab Results   Component Value Date    HGBA1C 6.1 11/20/2024    HGBA1C 6.1 07/17/2024    HGBA1C 6.1 03/07/2024        Pharmacist Tracking Tool     Pharmacist Tracking Tool  Reason For Outreach: Embedded Pharmacist  Demographics:  Intervention Method: Phone  Type of Intervention: Follow-Up  Topics Addressed: Diabetes  Pharmacologic Interventions: Medication Discontinuation and Prevent or Manage CARLO  Non-Pharmacologic Interventions: Disease state education, Home Monitoring, Medication/Device education, and Personal CGM  Time:  Direct Patient Care:  30  mins  Care Coordination:  10  mins  Recommendation Recipient: Patient/Caregiver and Provider  Outcome: Accepted

## 2025-02-21 NOTE — ASSESSMENT & PLAN NOTE
Lab Results   Component Value Date    HGBA1C 6.1 11/20/2024     Goal A1c <7% .   Complications:  Microvascular complications: None noted at this time  Macrovascular complications: None noted at this time  Current Diabetes Regimen:  Glimepiride- STOPPED LAST VISIT  Trulicity 0.75 mg weekly   Metformin 1000 mg BID  Historical DM Meds (reason for discontinuation):  Ozempic (diarrhea)  On Additional Therapies:  Statin: yes  ACEI/ARB: yes  Glucose control on CGM report  Within goal range of 70 - 180 mg/dL 82% of time (goal >70% of time)  Average glucose 144 mg/dL (goal <154 mg/dL)   GMI 6.8%        Assessment:   Glimepiride stopped due to hypoglycemia events last follow up. Since then she has had not hypoglycmeia events. She is still meeting glucose goals on report. She does have occasions of hyperglycemia up to 300 mg/dL when she eats certain foods. Discussed some diet changes.      Changes to medication regimen:  Continue Trulicity 0.75 mg weekly and metformin 1000 mg BID

## 2025-02-21 NOTE — PROGRESS NOTES
[facility] Clinical Pharmacy Services  Jamal De Anda    Administrative Statements   Encounter provider Jamal De Anda    The Patient is located at Home and in the following state in which I hold an active license [place].    The patient was identified by name and date of birth. Gloria Mitchell was informed that this is a telemedicine visit and that the visit is being conducted through Telephone.  My office door was closed. No one else was in the room.  She acknowledged consent and understanding of privacy and security of the video platform. The patient has agreed to participate and understands they can discontinue the visit at any time.    Assessment/ Plan     Assessment & Plan  Type 2 diabetes mellitus with other specified complication, with long-term current use of insulin ([facility])    Lab Results   Component Value Date    HGBA1C 6.1 11/20/2024     Goal A1c <7% .   Complications:  Microvascular complications: None noted at this time  Macrovascular complications: None noted at this time  Current Diabetes Regimen:  Glimepiride- STOPPED LAST VISIT  Trulicity 0.75 mg weekly   Metformin 1000 mg BID  Historical DM Meds (reason for discontinuation):  Ozempic (diarrhea)  On Additional Therapies:  Statin: yes  ACEI/ARB: yes  Glucose control on CGM report  Within goal range of 70 - 180 mg/dL 82% of time (goal >70% of time)  Average glucose 144 mg/dL (goal <154 mg/dL)   GMI 6.8%        Assessment:   Glimepiride stopped due to hypoglycemia events last follow up. Since then she has had not hypoglycmeia events. She is still meeting glucose goals on report. She does have occasions of hyperglycemia up to 300 mg/dL when she eats certain foods. Discussed some diet changes.      Changes to medication regimen:  Continue Trulicity 0.75 mg weekly and metformin 1000 mg BID           Follow-up: 4 weeks    Subjective   HPI    Medication Adherence/ Tolerability/ Cost:  Patient denies side effects  calcium carbonate-vitamin  D  lisinopril  meloxicam  metFORMIN  multivitamin Tabs  rosuvastatin  Julia Soaj  Vitamin C Tabs    Cost-approved for patient assistance program for Trulicity for 2025-year    Review of Systems   Gastrointestinal:  Negative for abdominal pain, constipation, diarrhea, nausea and vomiting.        2. Lifestyle:   Went out to eat with daughter for chinese buffet. Reports blood sugars went up to 300s which had her very concerned. She has egg drop soup, breaded shrimp (tried to peel breading off), egg roll, and ice cream. She also went out to eating later in evening than she would typically eat. Discussed smaller portions, avoid breading and ice cream. Choose foods with no breading such as chicken and broccoli. Avoid additional rice.     3. Home monitoring devices  Glucometer: Yes    Objective       Blood Sugar Readings- Dexcom                     ASCVD Risk:  The 10-year ASCVD risk score (Mynor ANTONIO, et al., 2019) is: 13.3%    Values used to calculate the score:      Age: 64 years      Sex: Female      Is Non- : No      Diabetic: Yes      Tobacco smoker: No      Systolic Blood Pressure: 126 mmHg      Is BP treated: Yes      HDL Cholesterol: 60 mg/dL      Total Cholesterol: 166 mg/dL     Vitals:  There were no vitals filed for this visit.    Eye Exam:    Lab Results   Component Value Date    LEFTDIABRET None 01/31/2024    RIGHTDIABRET None 01/31/2024       Labs:  Lab Results   Component Value Date    SODIUM 139 11/19/2024    K 4.4 11/19/2024    EGFR 64 11/19/2024    CREATININE 0.97 11/19/2024    [id number] 819 09/12/2019    MICROALBCRE  11/20/2024      Comment:      Unable to calculate.       Lab Results   Component Value Date    HGBA1C 6.1 11/20/2024    HGBA1C 6.1 07/17/2024    HGBA1C 6.1 03/07/2024       Pharmacist Tracking Tool     Pharmacist Tracking Tool  Reason For Outreach: Embedded Pharmacist  Demographics:  Intervention Method: Phone  Type of Intervention: Follow-Up  Topics Addressed:  Diabetes  Pharmacologic Interventions: N/A  Non-Pharmacologic Interventions: Disease state education, Home Monitoring, Medication/Device education, and Personal CGM  Time:  Direct Patient Care:  20  mins  Care Coordination:  10  mins  Recommendation Recipient: Patient/Caregiver and Provider  Outcome: Accepted

## 2025-02-24 ENCOUNTER — TELEMEDICINE (OUTPATIENT)
Dept: FAMILY MEDICINE CLINIC | Facility: CLINIC | Age: 65
End: 2025-02-24

## 2025-02-24 DIAGNOSIS — Z79.4 TYPE 2 DIABETES MELLITUS WITH OTHER SPECIFIED COMPLICATION, WITH LONG-TERM CURRENT USE OF INSULIN (HCC): Primary | ICD-10-CM

## 2025-02-24 DIAGNOSIS — E11.69 TYPE 2 DIABETES MELLITUS WITH OTHER SPECIFIED COMPLICATION, WITH LONG-TERM CURRENT USE OF INSULIN (HCC): Primary | ICD-10-CM

## 2025-02-24 PROCEDURE — PBNCHG PB NO CHARGE PLACEHOLDER: Performed by: PHARMACIST

## 2025-03-24 DIAGNOSIS — I10 HYPERTENSION, ESSENTIAL: ICD-10-CM

## 2025-03-24 RX ORDER — LISINOPRIL 10 MG/1
10 TABLET ORAL DAILY
Qty: 90 TABLET | Refills: 1 | Status: SHIPPED | OUTPATIENT
Start: 2025-03-24

## 2025-03-28 NOTE — ASSESSMENT & PLAN NOTE
Lab Results   Component Value Date    HGBA1C 6.1 11/20/2024     Goal A1c <7% .   On Additional Therapies:  Statin: yes  ACEI/ARB: yes  Glucose control on CGM report  Within goal range of 70 - 180 mg/dL 77% of time (goal >70% of time)  Average glucose 153 mg/dL (goal <154 mg/dL)   GMI 7%        Assessment:  Dexcom report shows fairly good glucose control but it has been increasing.  She does have hyperglycemia that occurs particularly after lunch and after dinner. An increase in her GLP-1 can help cover these after meal excursions.    She is unsure if she will continue Dexcom due to cost. Advised she can go back to fingersticks especially since the risk for hypoglycemia is lower now that glimepiride has been stopped.      Changes to medication regimen:  Metformin: Continue 1000 mg twice daily  Trulicity: Increase to 1.5 mg weekly. Added to med list but will need to fax dose increase form to [facility] care program.   Orders:    Dulaglutide (Trulicity) 1.5 MG/0.5ML SOAJ; Inject 1.5 mg under the skin once a week

## 2025-03-28 NOTE — PROGRESS NOTES
[facility] Clinical Pharmacy Services  Jamal De Anda    Administrative Statements   Encounter provider Jamal De Anda    The Patient is located at Home and in the following state in which I hold an active license [place].    The patient was identified by name and date of birth. Gloria Mitchell was informed that this is a telemedicine visit and that the visit is being conducted through Telephone.  My office door was closed. No one else was in the room.  She acknowledged consent and understanding of privacy and security of the video platform. The patient has agreed to participate and understands they can discontinue the visit at any time.    Assessment/ Plan     Assessment & Plan  Type 2 diabetes mellitus with other specified complication, with long-term current use of insulin ([facility])    Lab Results   Component Value Date    HGBA1C 6.1 11/20/2024     Goal A1c <7% .   On Additional Therapies:  Statin: yes  ACEI/ARB: yes  Glucose control on CGM report  Within goal range of 70 - 180 mg/dL 77% of time (goal >70% of time)  Average glucose 153 mg/dL (goal <154 mg/dL)   GMI 7%        Assessment:  Dexcom report shows fairly good glucose control but it has been increasing.  She does have hyperglycemia that occurs particularly after lunch and after dinner. An increase in her GLP-1 can help cover these after meal excursions.    She is unsure if she will continue Dexcom due to cost. Advised she can go back to fingersticks especially since the risk for hypoglycemia is lower now that glimepiride has been stopped.      Changes to medication regimen:  Metformin: Continue 1000 mg twice daily  Trulicity: Increase to 1.5 mg weekly. Added to med list but will need to fax dose increase form to [facility] care program.   Orders:    Dulaglutide (Trulicity) 1.5 MG/0.5ML SOAJ; Inject 1.5 mg under the skin once a week    Follow-up: 6 weeks    Subjective   Appointment today is for follow-up of type 2 diabetes.  At this time she has  no known diabetes related complications.  She is on a regimen of Trulicity and metformin.  Her glimepiride was  discontinued due to hypoglycemia events.  She does have Dexcom continuous glucose monitor.  Last visit we discussed dietary changes to help prevent after meal hyperglycemia due to dietary indiscretions.        Medication Adherence/ Tolerability/ Cost:  Patient denies side effects  calcium carbonate-vitamin D  lisinopril  meloxicam  metFORMIN  multivitamin Tabs  rosuvastatin  Trulicity Soaj 0.75 mg weekly- 2 boxes   Vitamin C Tabs    Cost-approved for patient assistance program for Trulicity for 2025-year    Historical DM Meds (reason for discontinuation):  Ozempic (diarrhea)    Review of Systems   Gastrointestinal:  Negative for abdominal pain, constipation, diarrhea, nausea and vomiting.        2. Lifestyle:       3. Home monitoring devices  Glucometer: Yes    Objective       Blood Sugar Readings- Dexcom                     ASCVD Risk:  The 10-year ASCVD risk score (Mynor ANTONIO, et al., 2019) is: 13.3%    Values used to calculate the score:      Age: 64 years      Sex: Female      Is Non- : No      Diabetic: Yes      Tobacco smoker: No      Systolic Blood Pressure: 126 mmHg      Is BP treated: Yes      HDL Cholesterol: 60 mg/dL      Total Cholesterol: 166 mg/dL     Vitals:  There were no vitals filed for this visit.    Eye Exam:    Lab Results   Component Value Date    LEFTDIABRET None 01/31/2024    RIGHTDIABRET None 01/31/2024       Labs:  Lab Results   Component Value Date    SODIUM 139 11/19/2024    K 4.4 11/19/2024    EGFR 64 11/19/2024    CREATININE 0.97 11/19/2024    [id number] 819 09/12/2019    MICROALBCRE  11/20/2024      Comment:      Unable to calculate.       Lab Results   Component Value Date    HGBA1C 6.1 11/20/2024    HGBA1C 6.1 07/17/2024    HGBA1C 6.1 03/07/2024       Pharmacist Tracking Tool     Pharmacist Tracking Tool  Reason For Outreach: Embedded  Pharmacist  Demographics:  Intervention Method: Phone  Type of Intervention: Follow-Up  Topics Addressed: Diabetes  Pharmacologic Interventions: Dose or Frequency Adjusted  Non-Pharmacologic Interventions: Disease state education, Home Monitoring, Medication/Device education, and Personal CGM  Time:  Direct Patient Care:  25  mins  Care Coordination:  10  mins  Recommendation Recipient: Patient/Caregiver and Provider  Outcome: Accepted

## 2025-03-31 ENCOUNTER — TELEMEDICINE (OUTPATIENT)
Dept: FAMILY MEDICINE CLINIC | Facility: CLINIC | Age: 65
End: 2025-03-31

## 2025-03-31 DIAGNOSIS — E11.69 TYPE 2 DIABETES MELLITUS WITH OTHER SPECIFIED COMPLICATION, WITH LONG-TERM CURRENT USE OF INSULIN (HCC): Primary | ICD-10-CM

## 2025-03-31 DIAGNOSIS — Z79.4 TYPE 2 DIABETES MELLITUS WITH OTHER SPECIFIED COMPLICATION, WITH LONG-TERM CURRENT USE OF INSULIN (HCC): Primary | ICD-10-CM

## 2025-03-31 PROCEDURE — PBNCHG PB NO CHARGE PLACEHOLDER: Performed by: PHARMACIST

## 2025-03-31 RX ORDER — DULAGLUTIDE 1.5 MG/.5ML
1.5 INJECTION, SOLUTION SUBCUTANEOUS WEEKLY
Start: 2025-03-31

## 2025-03-31 NOTE — Clinical Note
Hello- I attached a form for the Trulicity dose increase that needs Rito's signature in media tab. Can someone please print and put for Rito to sign. Once completed it can be faxed to program and then final version scanned into chart. Thanks for your help!

## 2025-04-07 ENCOUNTER — TELEPHONE (OUTPATIENT)
Dept: FAMILY MEDICINE CLINIC | Facility: CLINIC | Age: 65
End: 2025-04-07

## 2025-04-07 NOTE — TELEPHONE ENCOUNTER
Trulicity dose increase form partially completed and scanned into media tabs. Needs PCP signature.

## 2025-04-18 ENCOUNTER — TELEPHONE (OUTPATIENT)
Dept: FAMILY MEDICINE CLINIC | Facility: CLINIC | Age: 65
End: 2025-04-18

## 2025-05-06 ENCOUNTER — VBI (OUTPATIENT)
Dept: ADMINISTRATIVE | Facility: OTHER | Age: 65
End: 2025-05-06

## 2025-05-06 DIAGNOSIS — E78.2 MIXED HYPERLIPIDEMIA: ICD-10-CM

## 2025-05-06 DIAGNOSIS — Z79.4 TYPE 2 DIABETES MELLITUS WITH OTHER SPECIFIED COMPLICATION, WITH LONG-TERM CURRENT USE OF INSULIN (HCC): ICD-10-CM

## 2025-05-06 DIAGNOSIS — E11.69 TYPE 2 DIABETES MELLITUS WITH OTHER SPECIFIED COMPLICATION, WITH LONG-TERM CURRENT USE OF INSULIN (HCC): ICD-10-CM

## 2025-05-06 NOTE — TELEPHONE ENCOUNTER
05/06/25 1:59 PM     Chart reviewed for CRC: Colonoscopy was/were not submitted to the patient's insurance.     Harjit Menezes MA   [facility]

## 2025-05-07 DIAGNOSIS — M79.641 PAIN IN BOTH HANDS: ICD-10-CM

## 2025-05-07 DIAGNOSIS — M13.0 POLYARTICULAR ARTHRITIS: ICD-10-CM

## 2025-05-07 DIAGNOSIS — M79.642 PAIN IN BOTH HANDS: ICD-10-CM

## 2025-05-07 RX ORDER — ROSUVASTATIN CALCIUM 5 MG/1
5 TABLET, COATED ORAL DAILY
Qty: 90 TABLET | Refills: 1 | Status: SHIPPED | OUTPATIENT
Start: 2025-05-07

## 2025-05-07 RX ORDER — MELOXICAM 15 MG/1
15 TABLET ORAL DAILY
Qty: 90 TABLET | Refills: 1 | Status: SHIPPED | OUTPATIENT
Start: 2025-05-07

## 2025-05-19 ENCOUNTER — TELEMEDICINE (OUTPATIENT)
Dept: FAMILY MEDICINE CLINIC | Facility: CLINIC | Age: 65
End: 2025-05-19

## 2025-05-19 DIAGNOSIS — E11.69 TYPE 2 DIABETES MELLITUS WITH OTHER SPECIFIED COMPLICATION, WITH LONG-TERM CURRENT USE OF INSULIN (HCC): Primary | ICD-10-CM

## 2025-05-19 DIAGNOSIS — Z79.4 TYPE 2 DIABETES MELLITUS WITH OTHER SPECIFIED COMPLICATION, WITH LONG-TERM CURRENT USE OF INSULIN (HCC): Primary | ICD-10-CM

## 2025-05-19 PROCEDURE — PBNCHG PB NO CHARGE PLACEHOLDER: Performed by: PHARMACIST

## 2025-05-19 NOTE — PROGRESS NOTES
[facility] Clinical Pharmacy Services  Jamal De Anda    Administrative Statements   Encounter provider Jamal De Anda    The Patient is located at Home and in the following state in which I hold an active license [place].    The patient was identified by name and date of birth. Gloria Mitchell was informed that this is a telemedicine visit and that the visit is being conducted through Telephone.  My office door was closed. No one else was in the room.  She acknowledged consent and understanding of privacy and security of the video platform. The patient has agreed to participate and understands they can discontinue the visit at any time.    Assessment/ Plan     Assessment & Plan  Type 2 diabetes mellitus with other specified complication, with long-term current use of insulin ([facility])    Lab Results   Component Value Date    HGBA1C 6.1 11/20/2024   Goal A1c <7% .   On Additional Therapies:  Statin: yes  ACEI/ARB: yes        Assessment:  Morning fasting readings are slightly elevated however bedtime readings are within goal.  She increased Trulicity about a month ago.  Continue current dose for now and monitor glucose readings and A1c.  There is room to go up to the 3 mg weekly dose if needed.  I would recommend avoiding adding glimepiride back on due to severe hypoglycemia events less than 60 mg/dL when she was on the lowest dose of 2 mg daily.     Changes to medication regimen:  Metformin: Continue 1000 mg twice daily  Trulicity: Continue 1.5 mg weekly.          Follow-up: 4 weeks    Subjective   Appointment today is for follow-up of type 2 diabetes.  At this time she has no known diabetes related complications.  She is on a regimen of Trulicity and metformin.  Her glimepiride was  discontinued due to hypoglycemia events.  She does have Dexcom continuous glucose monitor.  Last visit we discussed dietary changes to help prevent after meal hyperglycemia due to dietary indiscretions.        Medication  Adherence/ Tolerability/ Cost:  Patient denies side effects  calcium carbonate-vitamin D  lisinopril  meloxicam  metFORMIN  multivitamin Tabs  rosuvastatin  Julia Soaj 1.5 mg weekly- increased on 4-5 weeks  Vitamin C Tabs    Cost-approved for patient assistance program for Trulicity for 2025-year    Historical DM Meds (reason for discontinuation):  Ozempic (diarrhea)    Review of Systems   Gastrointestinal:  Negative for abdominal pain, constipation, diarrhea, nausea and vomiting.        2. Lifestyle:       3. Home monitoring devices  Glucometer: Yes    Objective     Blood Glucose Readings  The patient is currently checking blood glucose 1 times per day. Patient reports with SMBG logs.    Date AM Dinner Post-Dinner Comments   5/1 159 121      186 174      163 215  chips    121 219      177 149      159 116      137 130      142 165      135 142      120 166      154 161      152 145      131 177  Chinese food    177 197  Bun and chips    166 191      162 146      178 189      183 183     5/19 179      Avg 156 165       ASCVD Risk:  The 10-year ASCVD risk score (Mynor ANTONIO, et al., 2019) is: 13.3%    Values used to calculate the score:      Age: 64 years      Clincally relevant sex: Female      Is Non- : No      Diabetic: Yes      Tobacco smoker: No      Systolic Blood Pressure: 126 mmHg      Is BP treated: Yes      HDL Cholesterol: 60 mg/dL      Total Cholesterol: 166 mg/dL     Vitals:  There were no vitals filed for this visit.    Eye Exam:    Lab Results   Component Value Date    LEFTDIABRET None 01/31/2024    RIGHTDIABRET None 01/31/2024       Labs:  Lab Results   Component Value Date    SODIUM 139 11/19/2024    K 4.4 11/19/2024    EGFR 64 11/19/2024    CREATININE 0.97 11/19/2024    [id number] 819 09/12/2019    MICROALBCRE  11/20/2024      Comment:      Unable to calculate.       Lab Results   Component Value Date    HGBA1C 6.1 11/20/2024    HGBA1C 6.1 07/17/2024    HGBA1C 6.1  03/07/2024       Pharmacist Tracking Tool     Pharmacist Tracking Tool  Reason For Outreach: Embedded Pharmacist  Demographics:  Intervention Method: Phone  Type of Intervention: Follow-Up  Topics Addressed: Diabetes  Pharmacologic Interventions: N/A  Non-Pharmacologic Interventions: Disease state education, Home Monitoring, Medication/Device education, and Personal CGM  Time:  Direct Patient Care: 20 mins  Care Coordination: 10 mins  Recommendation Recipient: Patient/Caregiver and Provider  Outcome: Accepted

## 2025-05-19 NOTE — ASSESSMENT & PLAN NOTE
Lab Results   Component Value Date    HGBA1C 6.1 11/20/2024   Goal A1c <7% .   On Additional Therapies:  Statin: yes  ACEI/ARB: yes        Assessment:  Morning fasting readings are slightly elevated however bedtime readings are within goal.  She increased Trulicity about a month ago.  Continue current dose for now and monitor glucose readings and A1c.  There is room to go up to the 3 mg weekly dose if needed.  I would recommend avoiding adding glimepiride back on due to severe hypoglycemia events less than 60 mg/dL when she was on the lowest dose of 2 mg daily.     Changes to medication regimen:  Metformin: Continue 1000 mg twice daily  Trulicity: Continue 1.5 mg weekly.

## 2025-05-21 ENCOUNTER — TELEPHONE (OUTPATIENT)
Age: 65
End: 2025-05-21

## 2025-05-21 ENCOUNTER — OFFICE VISIT (OUTPATIENT)
Dept: FAMILY MEDICINE CLINIC | Facility: CLINIC | Age: 65
End: 2025-05-21
Payer: COMMERCIAL

## 2025-05-21 VITALS
HEART RATE: 78 BPM | HEIGHT: 59 IN | TEMPERATURE: 95.8 F | SYSTOLIC BLOOD PRESSURE: 123 MMHG | DIASTOLIC BLOOD PRESSURE: 63 MMHG | BODY MASS INDEX: 40.64 KG/M2 | OXYGEN SATURATION: 95 % | WEIGHT: 201.6 LBS

## 2025-05-21 DIAGNOSIS — M54.42 ACUTE BILATERAL LOW BACK PAIN WITH BILATERAL SCIATICA: ICD-10-CM

## 2025-05-21 DIAGNOSIS — H65.92 FLUID LEVEL BEHIND TYMPANIC MEMBRANE OF LEFT EAR: ICD-10-CM

## 2025-05-21 DIAGNOSIS — Z11.59 NEED FOR HEPATITIS C SCREENING TEST: ICD-10-CM

## 2025-05-21 DIAGNOSIS — E78.2 MIXED HYPERLIPIDEMIA: ICD-10-CM

## 2025-05-21 DIAGNOSIS — M13.0 POLYARTICULAR ARTHRITIS: ICD-10-CM

## 2025-05-21 DIAGNOSIS — Z79.4 TYPE 2 DIABETES MELLITUS WITH OTHER SPECIFIED COMPLICATION, WITH LONG-TERM CURRENT USE OF INSULIN (HCC): Primary | ICD-10-CM

## 2025-05-21 DIAGNOSIS — K21.9 GERD WITHOUT ESOPHAGITIS: ICD-10-CM

## 2025-05-21 DIAGNOSIS — E66.01 MORBID OBESITY WITH BMI OF 40.0-44.9, ADULT (HCC): ICD-10-CM

## 2025-05-21 DIAGNOSIS — E11.69 TYPE 2 DIABETES MELLITUS WITH OTHER SPECIFIED COMPLICATION, WITH LONG-TERM CURRENT USE OF INSULIN (HCC): Primary | ICD-10-CM

## 2025-05-21 DIAGNOSIS — I10 HYPERTENSION, ESSENTIAL: ICD-10-CM

## 2025-05-21 DIAGNOSIS — M54.41 ACUTE BILATERAL LOW BACK PAIN WITH BILATERAL SCIATICA: ICD-10-CM

## 2025-05-21 DIAGNOSIS — Z12.11 SCREEN FOR COLON CANCER: ICD-10-CM

## 2025-05-21 LAB — SL AMB POCT HEMOGLOBIN AIC: 6.7 (ref ?–6.5)

## 2025-05-21 PROCEDURE — 83036 HEMOGLOBIN GLYCOSYLATED A1C: CPT

## 2025-05-21 PROCEDURE — 99214 OFFICE O/P EST MOD 30 MIN: CPT

## 2025-05-21 PROCEDURE — G2211 COMPLEX E/M VISIT ADD ON: HCPCS

## 2025-05-21 RX ORDER — FAMOTIDINE 20 MG/1
20 TABLET, FILM COATED ORAL 2 TIMES DAILY
COMMUNITY

## 2025-05-21 RX ORDER — LORATADINE 10 MG/1
10 TABLET ORAL DAILY
COMMUNITY

## 2025-05-21 RX ORDER — FLUTICASONE PROPIONATE 50 MCG
1 SPRAY, SUSPENSION (ML) NASAL DAILY
COMMUNITY

## 2025-05-21 NOTE — TELEPHONE ENCOUNTER
Would you be able to add these to her medication list?  I don't have permission unless I'm rooming.

## 2025-05-21 NOTE — ASSESSMENT & PLAN NOTE
Educated on healthy diet and activity.    Orders:    CBC and differential; Future    Comprehensive metabolic panel; Future

## 2025-05-21 NOTE — ASSESSMENT & PLAN NOTE
Stable on lisinopril.  Continue current.  Encouraged to take at home Bps and contact office if elevated.

## 2025-05-21 NOTE — ASSESSMENT & PLAN NOTE
Lab Results   Component Value Date    HGBA1C 6.7 (A) 05/21/2025     A1c still at goal.  Will continue with current management.  Foot exam normal today.  Educated on healthy diet and activity.  Orders:    POCT hemoglobin A1c    Hemoglobin A1C; Future    CBC and differential; Future    Comprehensive metabolic panel; Future    Lipid Panel with Direct LDL reflex; Future

## 2025-05-21 NOTE — ASSESSMENT & PLAN NOTE
Will f/u pending lab results.  Continue Crestor.  Educated on healthy diet and activity.  Orders:    Lipid Panel with Direct LDL reflex; Future

## 2025-05-21 NOTE — TELEPHONE ENCOUNTER
Patient mentioned that she was requested to provide wit the name of medication which she is on today's visit.    Please do pass the below informations to Rito.     Flutitasone Propionate Nasal Spray  Loratadin 10 mg Allergy Pill  Famotidine 20 mg     Thanks

## 2025-05-21 NOTE — ASSESSMENT & PLAN NOTE
Doing well with Tylenol.  Can continue this.  Educated on using 500-1000mg every 6hrs prn up to 4000mg in one day.  Educated on conservative measures.  Contact office if no improvement or worsening, and will do imaging.  Offered imaging and referrals along with oral steroid.  Would like to stick with Tylenol which is reasonable.

## 2025-05-21 NOTE — PROGRESS NOTES
Name: Gloria Mitchell      : 1960      MRN: [id number]  Encounter Provider: Rito Méndez PA-C  Encounter Date: 2025   Encounter department: [facility]  :  Assessment & Plan  Type 2 diabetes mellitus with other specified complication, with long-term current use of insulin (HCC)    Lab Results   Component Value Date    HGBA1C 6.7 (A) 2025     A1c still at goal.  Will continue with current management.  Foot exam normal today.  Educated on healthy diet and activity.  Orders:    POCT hemoglobin A1c    Hemoglobin A1C; Future    CBC and differential; Future    Comprehensive metabolic panel; Future    Lipid Panel with Direct LDL reflex; Future    Hypertension, essential  Stable on lisinopril.  Continue current.  Encouraged to take at home Bps and contact office if elevated.       GERD without esophagitis  Stable on pepcid.  No red flag signs/symptoms.  Contact office with worsening.       Polyarticular arthritis  Doing well on meloxicam.  Contact office with worsening       Mixed hyperlipidemia  Will f/u pending lab results.  Continue Crestor.  Educated on healthy diet and activity.  Orders:    Lipid Panel with Direct LDL reflex; Future    Morbid obesity with BMI of 40.0-44.9, adult (HCC)  Educated on healthy diet and activity.    Orders:    CBC and differential; Future    Comprehensive metabolic panel; Future    Need for hepatitis C screening test    Orders:    Hepatitis C antibody; Future    Screen for colon cancer  Risks/benefits discussed.  Declines.  Contact office to complete.       Acute bilateral low back pain with bilateral sciatica  Doing well with Tylenol.  Can continue this.  Educated on using 500-1000mg every 6hrs prn up to 4000mg in one day.  Educated on conservative measures.  Contact office if no improvement or worsening, and will do imaging.  Offered imaging and referrals along with oral steroid.  Would like to stick with Tylenol which is reasonable.       Fluid  level behind tympanic membrane of left ear  Patient is to contact office with generic allergy medication and nasal spray she is using.  Will either continue management or prescribe alternative based on these meds.  Also discussed singulair possibility given continuing PND, coughing, and allergy symptoms despite nasal spray and oral med.              History of Present Illness   Pt is a 63yo female presenting for f/u.  Having low back pain radiating to legs for 1 month.  Occurred after using steps multiple times, went away, then came back.  Unsure if this is affected by her sitting more in her chair since retiring.  Denies numbness/tingling, trauma, deformity, gait abnormalities, incontinence.  Also has left ear popping, PND with cough.  History of allergies.  On oral pill and nasal spray but unsure of which.  No other URI symptoms.      Review of Systems   Constitutional:  Negative for appetite change, chills, diaphoresis, fatigue and fever.   HENT:  Positive for congestion, postnasal drip and rhinorrhea. Negative for ear discharge, ear pain, sinus pressure, sinus pain, sneezing and sore throat.    Eyes:  Negative for pain, discharge, redness, itching and visual disturbance.   Respiratory:  Positive for cough. Negative for apnea, chest tightness, shortness of breath and wheezing.    Cardiovascular:  Negative for chest pain, palpitations and leg swelling.   Gastrointestinal:  Negative for abdominal pain, blood in stool, constipation, diarrhea, nausea and vomiting.   Endocrine: Negative for cold intolerance, heat intolerance, polydipsia and polyuria.   Genitourinary:  Negative for dysuria, flank pain, frequency, hematuria and urgency.   Musculoskeletal:  Positive for arthralgias and back pain. Negative for myalgias, neck pain and neck stiffness.   Skin:  Negative for color change and rash.   Allergic/Immunologic: Negative for environmental allergies and food allergies.   Neurological:  Negative for dizziness, tremors,  "seizures, syncope, speech difficulty, weakness, light-headedness, numbness and headaches.   Hematological:  Negative for adenopathy. Does not bruise/bleed easily.   Psychiatric/Behavioral:  Negative for agitation, confusion, decreased concentration, dysphoric mood, hallucinations, self-injury, sleep disturbance and suicidal ideas. The patient is not nervous/anxious and is not hyperactive.    All other systems reviewed and are negative.      Objective   /63 (BP Location: Left arm, Patient Position: Sitting)   Pulse 78   Temp (!) 95.8 °F (35.4 °C) (Tympanic)   Ht 4' 11.25\" (1.505 m)   Wt 91.4 kg (201 lb 9.6 oz)   SpO2 95%   BMI 40.38 kg/m²      Physical Exam  Vitals and nursing note reviewed.   Constitutional:       General: She is not in acute distress.     Appearance: Normal appearance. She is well-developed. She is obese. She is not ill-appearing, toxic-appearing or diaphoretic.   HENT:      Head: Normocephalic and atraumatic.      Right Ear: Tympanic membrane, ear canal and external ear normal.      Left Ear: Ear canal and external ear normal.      Ears:      Comments: Fluid behind left TM     Nose: Congestion and rhinorrhea present.      Mouth/Throat:      Mouth: Mucous membranes are moist.      Pharynx: Oropharynx is clear.     Eyes:      Conjunctiva/sclera: Conjunctivae normal.      Pupils: Pupils are equal, round, and reactive to light.     Neck:      Vascular: No carotid bruit.     Cardiovascular:      Rate and Rhythm: Normal rate and regular rhythm.      Pulses: Normal pulses. no weak pulses.           Dorsalis pedis pulses are 2+ on the right side and 2+ on the left side.        Posterior tibial pulses are 2+ on the right side and 2+ on the left side.      Heart sounds: Normal heart sounds. No murmur heard.  Pulmonary:      Effort: Pulmonary effort is normal. No respiratory distress.      Breath sounds: Normal breath sounds. No wheezing.   Chest:      Chest wall: No tenderness.   Abdominal:      " General: Bowel sounds are normal.      Palpations: Abdomen is soft. There is no mass.      Tenderness: There is no abdominal tenderness.     Musculoskeletal:         General: Tenderness (No pain on palpation.  Low back pain on active ROM) present. No swelling.      Cervical back: Normal range of motion and neck supple. No tenderness.      Right lower leg: No edema.      Left lower leg: No edema.   Feet:      Right foot:      Skin integrity: No ulcer, skin breakdown, erythema, warmth, callus or dry skin.      Left foot:      Skin integrity: No ulcer, skin breakdown, erythema, warmth, callus or dry skin.   Lymphadenopathy:      Cervical: No cervical adenopathy.     Skin:     General: Skin is warm and dry.      Capillary Refill: Capillary refill takes less than 2 seconds.      Findings: No erythema, lesion or rash.     Neurological:      General: No focal deficit present.      Mental Status: She is alert and oriented to person, place, and time. Mental status is at baseline.      Motor: No weakness.      Coordination: Coordination normal.      Gait: Gait normal.     Psychiatric:         Mood and Affect: Mood normal.         Behavior: Behavior normal.         Thought Content: Thought content normal.         Judgment: Judgment normal.       Patient's shoes and socks removed.    Right Foot/Ankle   Right Foot Inspection  Skin Exam: skin normal and skin intact. No dry skin, no warmth, no callus, no erythema, no maceration, no abnormal color, no pre-ulcer, no ulcer and no callus.     Toe Exam: ROM and strength within normal limits.     Sensory   Monofilament testing: intact    Vascular  Capillary refills: < 3 seconds  The right DP pulse is 2+. The right PT pulse is 2+.     Left Foot/Ankle  Left Foot Inspection  Skin Exam: skin normal and skin intact. No dry skin, no warmth, no erythema, no maceration, normal color, no pre-ulcer, no ulcer and no callus.     Toe Exam: ROM and strength within normal limits.     Sensory    Monofilament testing: intact    Vascular  Capillary refills: < 3 seconds  The left DP pulse is 2+. The left PT pulse is 2+.     Assign Risk Category  No deformity present  No loss of protective sensation  No weak pulses  Risk: 0

## 2025-06-06 ENCOUNTER — TELEPHONE (OUTPATIENT)
Dept: ADMINISTRATIVE | Facility: OTHER | Age: 65
End: 2025-06-06

## 2025-06-06 NOTE — TELEPHONE ENCOUNTER
----- Message from Florence OLIVER sent at 6/5/2025 11:54 AM EDT -----  Regarding: Care Gap Request  06/05/25 11:54 Donato, our patient, Gloria Mitchell.has had CRC: Colonoscopy completed/performed. Please assist in updating the patient chart by pulling the Care Everywhere (CE) document. The date of service is 6/5/14. Thank you,Florence Solorzano, ERICA YANES

## 2025-06-06 NOTE — TELEPHONE ENCOUNTER
Upon review of the In Basket request we were able to note that no further action is required. The patient chart is up to date as a result of a previous request.      Any additional questions or concerns should be emailed to the Practice Liaisons via the appropriate education email address, please do not reply via In Basket.    Thank you  Shanti Townsend MA   [facility]

## 2025-06-10 ENCOUNTER — VBI (OUTPATIENT)
Dept: ADMINISTRATIVE | Facility: OTHER | Age: 65
End: 2025-06-10

## 2025-06-10 NOTE — TELEPHONE ENCOUNTER
06/10/25 1:32 PM     Chart reviewed for Hemoglobin A1c was/were submitted to the patient's insurance.     Harjit Menezes MA   [facility]

## 2025-06-13 NOTE — PROGRESS NOTES
[facility] Clinical Pharmacy Services  Jamal De Anda    Administrative Statements   Encounter provider Jamal De Anda    The Patient is located at Home and in the following state in which I hold an active license [place].    The patient was identified by name and date of birth. Gloria Mitchell was informed that this is a telemedicine visit and that the visit is being conducted through Telephone.  My office door was closed. No one else was in the room.  She acknowledged consent and understanding of privacy and security of the video platform. The patient has agreed to participate and understands they can discontinue the visit at any time.    Assessment/ Plan     Assessment & Plan  Type 2 diabetes mellitus with other specified complication, with long-term current use of insulin (Formerly [facility])    Lab Results   Component Value Date    HGBA1C 6.7 (A) 05/21/2025   Goal A1c <7% .   On Additional Therapies:  Statin: yes  ACEI/ARB: yes        Assessment:  A1c is controlled. Morning fasting readings are slightly elevated however bedtime readings are within goal.  Room to increase Trulicity further which patient would prefer for more glucose control and additional weight loss benefits.      Changes to medication regimen:  Metformin: Continue 1000 mg twice daily  Trulicity: Increase to 3.0 mg weekly. Paperwork for Trulicity PAP will be completed and sent to PCP to sign. Updated med list.       Orders:    Dulaglutide (Trulicity) 3 MG/0.5ML SOAJ; Inject 3 mg under the skin once a week     Follow-up: 4 weeks    Subjective   Appointment today is for follow-up of type 2 diabetes.  At this time she has no known diabetes related complications.  She is on a regimen of Trulicity and metformin.  Her glimepiride was  discontinued due to hypoglycemia events.  She does have Dexcom continuous glucose monitor.  Last visit we discussed dietary changes to help prevent after meal hyperglycemia due to dietary  indiscretions.      Medication Adherence/ Tolerability/ Cost:  Patient denies side effects  calcium carbonate-vitamin D  lisinopril  meloxicam  metFORMIN  multivitamin Tabs  rosuvastatin  Trulicity Soaj 1.5 mg weekly- Has 2 boxed at home.   Vitamin C Tabs    Cost-approved for patient assistance program for Trulicity for 2025-year    Historical DM Meds (reason for discontinuation):  Ozempic (diarrhea)    Review of Systems   Gastrointestinal:  Negative for abdominal pain, constipation, diarrhea, nausea and vomiting.        2. Lifestyle:       3. Home monitoring devices  Glucometer: Yes    Objective     Blood Glucose Readings  The patient is currently checking blood glucose 1 times per day. Patient reports with SMBG logs.    Date Dinner Morning    5/19  179    161 162    161 174    162 185    154 104    230 157    181 140    193 162    192 186    161 165    196 156    206 150     165    198 152    143 183    204 150    158 169    114 132    196 116    114 133    196 116    128 133    176 148    171 163    254 167    113 101    168 155    158 140    153 153    200 154   6/16  165   Avg 173 152     ASCVD Risk:  The 10-year ASCVD risk score (Mynor ANTONIO, et al., 2019) is: 12.7%    Values used to calculate the score:      Age: 64 years      Clincally relevant sex: Female      Is Non- : No      Diabetic: Yes      Tobacco smoker: No      Systolic Blood Pressure: 123 mmHg      Is BP treated: Yes      HDL Cholesterol: 60 mg/dL      Total Cholesterol: 166 mg/dL     Vitals:  There were no vitals filed for this visit.    Eye Exam:    Lab Results   Component Value Date    LEFTDIABRET None 01/31/2024    RIGHTDIABRET None 01/31/2024       Labs:  Lab Results   Component Value Date    SODIUM 139 11/19/2024    K 4.4 11/19/2024    EGFR 64 11/19/2024    CREATININE 0.97 11/19/2024    [id number] 819 09/12/2019    MICROALBCRE  11/20/2024      Comment:      Unable to calculate.       Lab Results   Component Value Date     HGBA1C 6.7 (A) 05/21/2025    HGBA1C 6.1 11/20/2024    HGBA1C 6.1 07/17/2024       Pharmacist Tracking Tool     Pharmacist Tracking Tool  Reason For Outreach: Embedded Pharmacist  Demographics:  Intervention Method: Phone  Type of Intervention: Follow-Up  Topics Addressed: Diabetes  Pharmacologic Interventions: Dose or Frequency Adjusted  Non-Pharmacologic Interventions: Disease state education, Home Monitoring, Medication/Device education, and Personal CGM  Time:  Direct Patient Care: 20 mins  Care Coordination: 10 mins  Recommendation Recipient: Patient/Caregiver and Provider  Outcome: Accepted

## 2025-06-13 NOTE — ASSESSMENT & PLAN NOTE
Lab Results   Component Value Date    HGBA1C 6.7 (A) 05/21/2025   Goal A1c <7% .   On Additional Therapies:  Statin: yes  ACEI/ARB: yes        Assessment:  A1c is controlled. Morning fasting readings are slightly elevated however bedtime readings are within goal.  Room to increase Trulicity further which patient would prefer for more glucose control and additional weight loss benefits.      Changes to medication regimen:  Metformin: Continue 1000 mg twice daily  Trulicity: Increase to 3.0 mg weekly. Paperwork for Trulicity PAP will be completed and sent to PCP to sign. Updated med list.       Orders:    Dulaglutide (Trulicity) 3 MG/0.5ML SOAJ; Inject 3 mg under the skin once a week

## 2025-06-16 ENCOUNTER — TELEMEDICINE (OUTPATIENT)
Dept: FAMILY MEDICINE CLINIC | Facility: CLINIC | Age: 65
End: 2025-06-16

## 2025-06-16 DIAGNOSIS — E11.69 TYPE 2 DIABETES MELLITUS WITH OTHER SPECIFIED COMPLICATION, WITH LONG-TERM CURRENT USE OF INSULIN (HCC): Primary | ICD-10-CM

## 2025-06-16 DIAGNOSIS — Z79.4 TYPE 2 DIABETES MELLITUS WITH OTHER SPECIFIED COMPLICATION, WITH LONG-TERM CURRENT USE OF INSULIN (HCC): Primary | ICD-10-CM

## 2025-06-16 PROCEDURE — PBNCHG PB NO CHARGE PLACEHOLDER: Performed by: PHARMACIST

## 2025-06-16 RX ORDER — DULAGLUTIDE 3 MG/.5ML
3 INJECTION, SOLUTION SUBCUTANEOUS WEEKLY
Start: 2025-06-16

## 2025-06-17 ENCOUNTER — TELEPHONE (OUTPATIENT)
Dept: FAMILY MEDICINE CLINIC | Facility: CLINIC | Age: 65
End: 2025-06-17

## 2025-06-17 NOTE — TELEPHONE ENCOUNTER
Dose increase form for Trulicity 3 mg weekly partially completed and scanned into media tab. Can you please print and have Rito Méndez PA-C sign and then fax to program/ scan completed form into media tab?

## 2025-08-04 DIAGNOSIS — E11.9 TYPE 2 DIABETES MELLITUS WITHOUT COMPLICATION, WITHOUT LONG-TERM CURRENT USE OF INSULIN (HCC): ICD-10-CM

## 2025-08-11 ENCOUNTER — TELEMEDICINE (OUTPATIENT)
Dept: FAMILY MEDICINE CLINIC | Facility: CLINIC | Age: 65
End: 2025-08-11

## 2025-08-12 ENCOUNTER — TELEPHONE (OUTPATIENT)
Age: 65
End: 2025-08-12

## 2025-08-20 ENCOUNTER — TELEPHONE (OUTPATIENT)
Dept: FAMILY MEDICINE CLINIC | Facility: CLINIC | Age: 65
End: 2025-08-20